# Patient Record
Sex: FEMALE | Race: BLACK OR AFRICAN AMERICAN | Employment: UNEMPLOYED | URBAN - METROPOLITAN AREA
[De-identification: names, ages, dates, MRNs, and addresses within clinical notes are randomized per-mention and may not be internally consistent; named-entity substitution may affect disease eponyms.]

---

## 2017-01-30 ENCOUNTER — OFFICE VISIT (OUTPATIENT)
Dept: FAMILY MEDICINE CLINIC | Age: 37
End: 2017-01-30

## 2017-01-30 VITALS
OXYGEN SATURATION: 99 % | DIASTOLIC BLOOD PRESSURE: 68 MMHG | HEIGHT: 65 IN | HEART RATE: 92 BPM | TEMPERATURE: 98.3 F | RESPIRATION RATE: 16 BRPM | WEIGHT: 293 LBS | SYSTOLIC BLOOD PRESSURE: 138 MMHG | BODY MASS INDEX: 48.82 KG/M2

## 2017-01-30 DIAGNOSIS — Z86.73 HISTORY OF TIA (TRANSIENT ISCHEMIC ATTACK): ICD-10-CM

## 2017-01-30 DIAGNOSIS — E11.69 DIABETES MELLITUS TYPE 2 IN OBESE (HCC): Chronic | ICD-10-CM

## 2017-01-30 DIAGNOSIS — I10 ESSENTIAL HYPERTENSION WITH GOAL BLOOD PRESSURE LESS THAN 140/90: Primary | ICD-10-CM

## 2017-01-30 DIAGNOSIS — E66.9 DIABETES MELLITUS TYPE 2 IN OBESE (HCC): Chronic | ICD-10-CM

## 2017-01-30 RX ORDER — ALBUTEROL SULFATE 90 UG/1
2 AEROSOL, METERED RESPIRATORY (INHALATION)
Qty: 1 INHALER | Refills: 5 | Status: SHIPPED | OUTPATIENT
Start: 2017-01-30 | End: 2020-05-08 | Stop reason: SDUPTHER

## 2017-01-30 RX ORDER — ATORVASTATIN CALCIUM 40 MG/1
40 TABLET, FILM COATED ORAL
Qty: 30 TAB | Refills: 5 | Status: SHIPPED | OUTPATIENT
Start: 2017-01-30 | End: 2019-04-03 | Stop reason: ALTCHOICE

## 2017-01-30 RX ORDER — GUAIFENESIN 100 MG/5ML
81 LIQUID (ML) ORAL DAILY
Qty: 30 TAB | Refills: 0 | Status: CANCELLED | OUTPATIENT
Start: 2017-01-30

## 2017-01-30 RX ORDER — AMLODIPINE BESYLATE 5 MG/1
5 TABLET ORAL DAILY
Qty: 30 TAB | Refills: 5 | Status: SHIPPED | OUTPATIENT
Start: 2017-01-30 | End: 2019-06-10

## 2017-01-30 NOTE — PROGRESS NOTES
Subjective:     Chief Complaint   Patient presents with    Hypertension     6 month follow-up      She  is a 31 y.o. female who presents for evaluation of:  Hyperlipidemia & HTN  Pt is doing well on current meds with no medication side effects noted  No new myalgias, no joint pains, no weakness  No chest pain on exertion, no dyspnea on exertion, no swelling of ankles. Exercising - 2x per day  Dieting - Yes  Smoking - No     Lab Results   Component Value Date/Time    Cholesterol, total 117 07/15/2016 04:46 AM    HDL Cholesterol 35 07/15/2016 04:46 AM    LDL, calculated 62.6 07/15/2016 04:46 AM    Triglyceride 97 07/15/2016 04:46 AM     F/u after probable TIA with left sided weakness. During hospitalization, had 2 CTs, Echo, Carotid dopplers, labs, and MRI Brain that were all unrevealing. Concerning Fhx with uncle having 2 CVAs at a younger age. Sx had nearly resolved. After going back to work, she had some dizziness sx with min hand weakness. She went home early and sx resolved. Work stress was severe until leaving her job back in July. Home stressors improved after recent move. Had left sided weakness on 8/15/16. Was laying on sofa watching TV and then had weakness when standing. Leg seemed more weak than arm. Sx last 30-45 minutes. Resolved spontaneously with  helping her walk around. No specific triggers or stressors. Noticed bottom of feet are numb and tingling when laying down on stomach x 2 episodes. This has resolved. No further symptoms since last appt. Taking ASA 81, Lipitor, and BP controlled with Amlodipine.     ROS  Gen - no fever/chills  Resp - no dyspnea or cough  CV - no chest pain or DURON  Rest per HPI     Objective:     Vitals:    01/30/17 1040   BP: 138/68   Pulse: 92   Resp: 16   Temp: 98.3 °F (36.8 °C)   TempSrc: Oral   SpO2: 99%   Weight: 335 lb 9.6 oz (152.2 kg)   Height: 5' 5\" (1.651 m)     Physical Examination:  General appearance - alert, well appearing, and in no distress  Eyes -sclera anicteric  Neck - supple, no significant adenopathy, no thyromegaly, no bruits  Chest - clear to auscultation, no wheezes, rales or rhonchi, symmetric air entry  Heart - normal rate, regular rhythm, normal S1, S2, no murmurs, rubs, clicks or gallops  Neurological - alert, oriented, no focal findings or movement disorder noted, nml FNF and HOANG, neg pronator drift, nml strength and sensation, nml patellar reflexes, quick witted responses to questions  Extr - no edema    Past Medical History   Diagnosis Date    Asthma     Diabetes ([facility] [address] [facility]     Diabetes mellitus type 2 in obese -- Diet controlled 10/28/2014    Morbid obesity ([facility] [address] 10/28/2014     Past Surgical History   Procedure Laterality Date    Hx gyn       3 C-sections    Hx gyn       Miscarriage     Current Outpatient Prescriptions on File Prior to Visit   Medication Sig Dispense Refill    aspirin 81 mg chewable tablet Take 1 Tab by mouth daily. 30 Tab 0     No current facility-administered medications on file prior to visit. Assessment/ Plan:   Ruth was seen today for hypertension. Diagnoses and all orders for this visit:    Essential hypertension with goal blood pressure less than 140/90 - controlled  -     amLODIPine (NORVASC) 5 mg tablet; Take 1 Tab by mouth daily. Indications: Hypertension    History of TIA (transient ischemic attack) - no new sx. Sx resolved, no further TIA sx since hospitalization. Advised to ct  mg daily. Would still consider doing hypercoag w/u including antiphospholipid testing given 2 previous miscarriages and unclear etiology. -     amLODIPine (NORVASC) 5 mg tablet; Take 1 Tab by mouth daily. Indications: Hypertension  -     atorvastatin (LIPITOR) 40 mg tablet; Take 1 Tab by mouth nightly. Indications: hypercholesterolemia  -     LIPID PANEL; Future    Diabetes mellitus type 2 in obese -- Diet controlled  -     LIPID PANEL; Future  -     HEMOGLOBIN A1C WITH EAG;  Future    Other orders  -     albuterol (PROVENTIL HFA, VENTOLIN HFA, PROAIR HFA) 90 mcg/actuation inhaler; Take 2 Puffs by inhalation every four (4) hours as needed for Wheezing. I have discussed the diagnosis with the patient and the intended plan as seen in the above orders. The patient has received an after-visit summary and questions were answered concerning future plans. I have discussed medication side effects and warnings with the patient as well. The patient verbalizes understanding and agreement with the plan. Follow-up Disposition:  Return in about 3 months (around 4/30/2017), or if symptoms worsen or fail to improve.

## 2017-01-30 NOTE — PROGRESS NOTES
Chief Complaint   Patient presents with    Hypertension     6 month follow-up   Discuss restarting Adipex  Room 6

## 2017-01-30 NOTE — MR AVS SNAPSHOT
Visit Information Date & Time Provider Department Dept. Phone Encounter #  
 1/30/2017 10:30 AM Belia Brar MD [facility] at [facility] [id number] Follow-up Instructions Return in about 3 months (around 4/30/2017), or if symptoms worsen or fail to improve. Upcoming Health Maintenance Date Due  
 EYE EXAM RETINAL OR DILATED Q1 10/9/2016 MICROALBUMIN Q1 10/28/2016 HEMOGLOBIN A1C Q6M 1/16/2017 LIPID PANEL Q1 7/15/2017 FOOT EXAM Q1 7/25/2017 PAP AKA CERVICAL CYTOLOGY 3/15/2018 DTaP/Tdap/Td series (2 - Td) 7/25/2026 Allergies as of 1/30/2017  Review Complete On: 1/30/2017 By: Belia Brar MD  
  
 Severity Noted Reaction Type Reactions Metformin  12/03/2014   Systemic Other (comments) Hair loss and unstable blood sugars Pcn [Penicillins]  10/28/2014    Swelling Current Immunizations  Never Reviewed No immunizations on file. Not reviewed this visit You Were Diagnosed With   
  
 Codes Comments Essential hypertension with goal blood pressure less than 140/90    -  Primary ICD-10-CM: I10 
ICD-9-CM: 401.9 History of TIA (transient ischemic attack)     ICD-10-CM: Z86.73 
ICD-9-CM: V12.54 Diabetes mellitus type 2 in obese (HCC)     ICD-10-CM: E11.9, E66.9 ICD-9-CM: 250.00, 278.00 Vitals BP Pulse Temp Resp Height(growth percentile) Weight(growth percentile)  
 138/68 (BP 1 Location: Left arm, BP Patient Position: Sitting) 92 98.3 °F (36.8 °C) (Oral) 16 5' 5\" (1.651 m) 335 lb 9.6 oz (152.2 kg) LMP SpO2 BMI OB Status Smoking Status 12/27/2016 99% 55.85 kg/m2 Unknown Former Smoker Vitals History BMI and BSA Data Body Mass Index Body Surface Area 55.85 kg/m 2 2.64 m 2 Preferred Pharmacy Pharmacy Name Phone [facility] [address] [phone] Your Updated Medication List  
  
   
 This list is accurate as of: 1/30/17 11:14 AM.  Always use your most recent med list.  
  
  
  
  
 albuterol 90 mcg/actuation inhaler Commonly known as:  PROVENTIL HFA, VENTOLIN HFA, PROAIR HFA Take 2 Puffs by inhalation every four (4) hours as needed for Wheezing. amLODIPine 5 mg tablet Commonly known as:  Elspemallika Bakes Take 1 Tab by mouth daily. Indications: Hypertension  
  
 aspirin 81 mg chewable tablet Take 1 Tab by mouth daily. atorvastatin 40 mg tablet Commonly known as:  LIPITOR Take 1 Tab by mouth nightly. Indications: hypercholesterolemia Prescriptions Sent to Pharmacy Refills  
 amLODIPine (NORVASC) 5 mg tablet 5 Sig: Take 1 Tab by mouth daily. Indications: Hypertension Class: Normal  
 Pharmacy: [address] Ph #: [phone] Route: Oral  
 atorvastatin (LIPITOR) 40 mg tablet 5 Sig: Take 1 Tab by mouth nightly. Indications: hypercholesterolemia Class: Normal  
 Pharmacy: [address] Ph #: [phone] Route: Oral  
 albuterol (PROVENTIL HFA, VENTOLIN HFA, PROAIR HFA) 90 mcg/actuation inhaler 5 Sig: Take 2 Puffs by inhalation every four (4) hours as needed for Wheezing. Class: Normal  
 Pharmacy: [address] Ph #: [phone] Route: Inhalation Follow-up Instructions Return in about 3 months (around 4/30/2017), or if symptoms worsen or fail to improve. To-Do List   
 01/30/2017 Lab:  HEMOGLOBIN A1C WITH EAG   
  
 01/30/2017 Lab:  LIPID PANEL Introducing [facility]! Keven Velazquez introduces [employer] patient portal. Now you can access parts of your medical record, email your doctor's office, and request medication refills online. 1. In your internet browser, go to [email or web address] 2. Click on the First Time User? Click Here link in the Sign In box. You will see the New Member Sign Up page. 3. Enter your Carma Access Code exactly as it appears below. You will not need to use this code after youve completed the sign-up process. If you do not sign up before the expiration date, you must request a new code. · Carma Access Code: [email or web address] Expires: 4/30/2017 11:14 AM 
 
4. Enter the last four digits of your Social Security Number (xxxx) and Date of Birth (mm/dd/yyyy) as indicated and click Submit. You will be taken to the next sign-up page. 5. Create a Carma ID. This will be your Carma login ID and cannot be changed, so think of one that is secure and easy to remember. 6. Create a Carma password. You can change your password at any time. 7. Enter your Password Reset Question and Answer. This can be used at a later time if you forget your password. 8. Enter your e-mail address. You will receive e-mail notification when new information is available in [address] 9. Click Sign Up. You can now view and download portions of your medical record. 10. Click the Download Summary menu link to download a portable copy of your medical information. If you have questions, please visit the Frequently Asked Questions section of the Carma website. Remember, Carma is NOT to be used for urgent needs. For medical emergencies, dial 911. Now available from your iPhone and Android! Please provide this summary of care documentation to your next provider. Your primary care clinician is listed as Belia Brar. If you have any questions after today's visit, please call [phone].

## 2019-04-03 ENCOUNTER — HOSPITAL ENCOUNTER (OUTPATIENT)
Dept: GENERAL RADIOLOGY | Age: 39
Discharge: HOME | End: 2019-04-03
Payer: MEDICAID

## 2019-04-03 ENCOUNTER — OFFICE VISIT (OUTPATIENT)
Dept: FAMILY MEDICINE CLINIC | Age: 39
End: 2019-04-03

## 2019-04-03 VITALS
HEART RATE: 97 BPM | SYSTOLIC BLOOD PRESSURE: 147 MMHG | TEMPERATURE: 98.3 F | OXYGEN SATURATION: 98 % | HEIGHT: 65 IN | BODY MASS INDEX: 48.82 KG/M2 | WEIGHT: 293 LBS | RESPIRATION RATE: 18 BRPM | DIASTOLIC BLOOD PRESSURE: 72 MMHG

## 2019-04-03 DIAGNOSIS — E66.9 DIABETES MELLITUS TYPE 2 IN OBESE (HCC): ICD-10-CM

## 2019-04-03 DIAGNOSIS — M25.562 ACUTE PAIN OF LEFT KNEE: ICD-10-CM

## 2019-04-03 DIAGNOSIS — L81.0 POSTINFLAMMATORY HYPERPIGMENTATION: ICD-10-CM

## 2019-04-03 DIAGNOSIS — M76.52 PATELLAR TENDINITIS OF LEFT KNEE: ICD-10-CM

## 2019-04-03 DIAGNOSIS — E11.69 DIABETES MELLITUS TYPE 2 IN OBESE (HCC): ICD-10-CM

## 2019-04-03 DIAGNOSIS — Z00.00 WELL ADULT EXAM: Primary | ICD-10-CM

## 2019-04-03 DIAGNOSIS — L81.1 MELASMA: ICD-10-CM

## 2019-04-03 LAB
BILIRUB UR QL STRIP: NEGATIVE
GLUCOSE UR-MCNC: NEGATIVE MG/DL
KETONES P FAST UR STRIP-MCNC: NEGATIVE MG/DL
PH UR STRIP: 5.5 [PH] (ref 4.6–8)
PROT UR QL STRIP: NEGATIVE
SP GR UR STRIP: 1.02 (ref 1–1.03)
UA UROBILINOGEN AMB POC: NORMAL (ref 0.2–1)
URINALYSIS CLARITY POC: NORMAL
URINALYSIS COLOR POC: NORMAL
URINE BLOOD POC: NEGATIVE
URINE LEUKOCYTES POC: NEGATIVE
URINE NITRITES POC: NEGATIVE

## 2019-04-03 PROCEDURE — 73560 X-RAY EXAM OF KNEE 1 OR 2: CPT

## 2019-04-03 RX ORDER — MELOXICAM 15 MG/1
15 TABLET ORAL
Qty: 30 TAB | Refills: 0 | Status: SHIPPED | OUTPATIENT
Start: 2019-04-03 | End: 2019-08-08 | Stop reason: ALTCHOICE

## 2019-04-03 RX ORDER — BISMUTH SUBSALICYLATE 262 MG
1 TABLET,CHEWABLE ORAL DAILY
COMMUNITY
End: 2020-08-17

## 2019-04-03 NOTE — LETTER
NOTIFICATION RETURN TO WORK / SCHOOL 
 
4/3/2019 12:26 PM 
 
Ms. Charbel Jose Alberto [address] [place] [address] To Whom It May Concern: 
 
Charbel [address] [facility] the [facility] She will return to work/school on: 4/8/19 If there are questions or concerns please have the patient contact our office.  
 
 
 
Sincerely, 
 
 
Amanda Castaneda MD

## 2019-04-03 NOTE — PROGRESS NOTES
Subjective:     Chief Complaint   Patient presents with    Complete Physical     Annual        She  is a 42 y.o. female who presents for evaluation of:  Pap about 2 yrs ago. LMP was last week. Menses are regular. Using condom for prevention. Diabetes Mellitus:  Taking meds, home glucose monitoring: is not performed  Reports no polyuria or polydipsia, no chest pain, dyspnea or TIA's, no numbness, tingling or pain in extremities, last eye exam approximately > 1 yr ago. Exercises regularly with walking until knee injury 3 weeks ago. Compliant with diabetic diet. Avoids sodas and sweet teas. Avoids fast food. Limits carbs. Pt is a non smoker x 1 yr. Lab Results   Component Value Date/Time    Hemoglobin A1c (POC) 6.5 03/30/2016 10:46 AM    Hemoglobin A1c (POC) 6.4 10/28/2015 12:06 PM    Hemoglobin A1c 6.6 (H) 07/16/2016 04:12 AM    Microalb/Creat ratio (ug/mg creat.) <2.3 04/03/2019 12:30 PM    LDL, calculated 62.6 07/15/2016 04:46 AM    Creatinine 0.72 07/15/2016 04:46 AM      Lab Results   Component Value Date/Time    GFR est AA >60 07/15/2016 04:46 AM    GFR est non-AA >60 07/15/2016 04:46 AM      Lab Results   Component Value Date/Time    TSH 5.57 (H) 07/15/2016 04:46 AM       ROS:  Constitutional: negative for fevers, chills and fatigue  Eyes: negative for visual disturbance  Ears, nose, mouth, throat, and face: negative for hearing loss and sore throat  Respiratory: negative for cough or dyspnea on exertion  Cardiovascular: negative for chest pain, dyspnea, palpitations, fatigue  Gastrointestinal: negative for nausea, vomiting, change in bowel habits, diarrhea and abdominal pain  Genitourinary:negative for frequency and dysuria  Integument/breast: + skin changes around bilat eyes, has become darker over time. Hematologic/lymphatic: negative for easy bruising and bleeding  Musculoskeletal: left knee pain - no injury. tried ice, rest, and aleve. Getting tight after standing.   Worse with walking down hills and stairs. Sx x 3 weeks. Recently moved just before this. Neurological: negative for headaches and dizziness  Behavioral/Psych: negative for anxiety and depression     Objective:     Vitals:    04/03/19 1046   BP: 147/72   Pulse: 97   Resp: 18   Temp: 98.3 °F (36.8 °C)   TempSrc: Oral   SpO2: 98%   Weight: 336 lb (152.4 kg)   Height: 5' 5\" (1.651 m)     Physical Examination:  General appearance - alert, well appearing, and in no distress  Ears - bilat nml TMs  Eyes - sclera anicteric  Nose - normal and patent, no erythema, discharge or polyps  Mouth - mucous membranes moist, pharynx normal without lesions  Neck - supple, no significant adenopathy  Lymphatics - no palpable lymphadenopathy, no hepatosplenomegaly  Chest - clear to auscultation, no wheezes, rales or rhonchi, symmetric air entry  Heart - normal rate, regular rhythm, normal S1, S2, no murmurs, rubs, clicks or gallops  Abdomen - soft, nontender, nondistended, no masses or organomegaly   - not indicated  Neurological - alert, oriented, normal speech, no focal findings or movement disorder noted  Musculoskeletal -full range of motion, mildly tender to palpation over patellar tendon, no joint line tenderness, negative Lachman's and posterior drawer. No effusion or bruising. Extremities - no edema  Skin -hyperpigmented patches lateral to bilateral eyes  Psych - nml mood and affect    Past Medical History:   Diagnosis Date    Asthma     [facility] [address]     Diabetes mellitus type 2 in obese -- Diet controlled 10/28/2014    Morbid obesity ([facility] [address] 10/28/2014     Past Surgical History:   Procedure Laterality Date    HX GYN      3 C-sections    HX GYN      Miscarriage     Current Outpatient Medications on File Prior to Visit   Medication Sig Dispense Refill    FENUGREEK SEED EXTRACT PO Take  by mouth daily.  MILK THISTLE PO Take  by mouth daily.  multivitamin (ONE A DAY) tablet Take 1 Tab by mouth daily.       cranberry extract 450 mg tab tablet Take 450 mg by mouth daily.  albuterol (PROVENTIL HFA, VENTOLIN HFA, PROAIR HFA) 90 mcg/actuation inhaler Take 2 Puffs by inhalation every four (4) hours as needed for Wheezing. 1 Inhaler 5    amLODIPine (NORVASC) 5 mg tablet Take 1 Tab by mouth daily. Indications: Hypertension 30 Tab 5     No current facility-administered medications on file prior to visit. Assessment/ Plan:   Diagnoses and all orders for this visit:    1. Well adult exam  -     CBC W/O DIFF  -     HEMOGLOBIN A1C WITH EAG  -     LIPID PANEL  -     METABOLIC PANEL, COMPREHENSIVE  -     TSH 3RD GENERATION  -     AMB POC URINALYSIS DIP STICK AUTO W/O MICRO  -     REFERRAL TO OBSTETRICS AND GYNECOLOGY    2. Diabetes mellitus type 2 in obese (HCC)-well-controlled, rechecking labs  -     HEMOGLOBIN A1C WITH EAG  -     LIPID PANEL  -     METABOLIC PANEL, COMPREHENSIVE  -     TSH 3RD GENERATION  -     MICROALBUMIN, UR, RAND W/ MICROALB/CREAT RATIO    3. Body mass index (BMI) of 50.0 to 59.9 in adult (HCC)-was doing well with working out regularly but injured knee recently so has been unable to continue losing weight  Discussed the patient's BMI. The BMI follow up plan is as follows:   dietary management education, guidance, and counseling  encourage exercise  monitor weight  prescribed dietary intake    4. Acute pain of left knee-getting x-rays and will use Mobic acutely. Would expect full resolution over 4 to 6 weeks  -     XR KNEE LT MAX 2 VWS; Future  -     meloxicam (MOBIC) 15 mg tablet; Take 1 Tab by mouth daily (after breakfast). Take x 1 week and then stop. May use daily after this as needed. 5. Postinflammatory hyperpigmentation  6. Melasma  - Unclear etiology at this point, suspecting melasma but patient has noticed this getting worse over the past year  -     REFERRAL TO DERMATOLOGY    7.  Patellar tendinitis of left knee-given reassurance and treating as above    I have discussed the diagnosis with the patient and the intended plan as seen in the above orders. The patient has received an after-visit summary and questions were answered concerning future plans. I have discussed medication side effects and warnings with the patient as well. The patient verbalizes understanding and agreement with the plan. Follow-up and Dispositions    · Return in about 3 months (around 7/3/2019).

## 2019-04-03 NOTE — PROGRESS NOTES
Chief Complaint   Patient presents with    Complete Physical     Annual   1. Have you been to the ER, urgent care clinic since your last visit? Hospitalized since your last visit? Yes Where: [facility]    2. Have you seen or consulted any other health care providers outside of the [address] [place] [address] since your last visit? Include any pap smears or colon screening.  No   Discuss Left knee pain

## 2019-04-04 LAB
ALBUMIN/CREAT UR: <2.3 MG/G CREAT (ref 0–30)
CREAT UR-MCNC: 129.6 MG/DL
MICROALBUMIN UR-MCNC: <3 UG/ML

## 2019-04-15 RX ORDER — PHENTERMINE HYDROCHLORIDE 37.5 MG/1
37.5 TABLET ORAL
Qty: 30 TAB | Refills: 1 | Status: SHIPPED | OUTPATIENT
Start: 2019-04-15 | End: 2020-03-03 | Stop reason: SDUPTHER

## 2019-05-22 DIAGNOSIS — J01.00 ACUTE MAXILLARY SINUSITIS, RECURRENCE NOT SPECIFIED: ICD-10-CM

## 2019-05-22 RX ORDER — AZITHROMYCIN 250 MG/1
TABLET, FILM COATED ORAL
Qty: 6 TAB | Refills: 0 | Status: SHIPPED | OUTPATIENT
Start: 2019-05-22 | End: 2019-05-27

## 2019-05-22 NOTE — PROGRESS NOTES
Pt with purulent sputum, fever, and coughing x1 week.   She has a penicillin allergy has tolerated azithromycin well in the past.  Sending an Rx for this and will have patient follow-up in 1 week if symptoms are not resolved

## 2019-08-08 ENCOUNTER — OFFICE VISIT (OUTPATIENT)
Dept: FAMILY MEDICINE CLINIC | Age: 39
End: 2019-08-08

## 2019-08-08 VITALS
OXYGEN SATURATION: 96 % | DIASTOLIC BLOOD PRESSURE: 67 MMHG | HEIGHT: 65 IN | HEART RATE: 94 BPM | TEMPERATURE: 98.4 F | BODY MASS INDEX: 48.82 KG/M2 | WEIGHT: 293 LBS | RESPIRATION RATE: 18 BRPM | SYSTOLIC BLOOD PRESSURE: 143 MMHG

## 2019-08-08 DIAGNOSIS — E11.69 DIABETES MELLITUS TYPE 2 IN OBESE (HCC): Primary | ICD-10-CM

## 2019-08-08 DIAGNOSIS — M79.642 BILATERAL HAND PAIN: ICD-10-CM

## 2019-08-08 DIAGNOSIS — G89.29 CHRONIC PAIN OF LEFT KNEE: ICD-10-CM

## 2019-08-08 DIAGNOSIS — M79.641 BILATERAL HAND PAIN: ICD-10-CM

## 2019-08-08 DIAGNOSIS — M76.52 PATELLAR TENDINITIS OF LEFT KNEE: ICD-10-CM

## 2019-08-08 DIAGNOSIS — M25.562 CHRONIC PAIN OF LEFT KNEE: ICD-10-CM

## 2019-08-08 DIAGNOSIS — E66.9 DIABETES MELLITUS TYPE 2 IN OBESE (HCC): Primary | ICD-10-CM

## 2019-08-08 RX ORDER — MELOXICAM 15 MG/1
15 TABLET ORAL
Qty: 30 TAB | Refills: 0 | Status: SHIPPED | OUTPATIENT
Start: 2019-08-08 | End: 2020-04-23 | Stop reason: ALTCHOICE

## 2019-08-08 RX ORDER — DICLOFENAC SODIUM 10 MG/G
GEL TOPICAL
Qty: 100 G | Refills: 1 | Status: SHIPPED | OUTPATIENT
Start: 2019-08-08 | End: 2020-04-23 | Stop reason: ALTCHOICE

## 2019-08-08 NOTE — PROGRESS NOTES
Chief Complaint   Patient presents with    Weight Management     3 month follow-up   1. Have you been to the ER, urgent care clinic since your last visit? Hospitalized since your last visit? No    2. Have you seen or consulted any other health care providers outside of the [address] [place] [address] since your last visit? Include any pap smears or colon screening.  No   Discuss weight

## 2019-08-08 NOTE — PROGRESS NOTES
Subjective:     Chief Complaint   Patient presents with    Weight Management     3 month follow-up        She  is a 42 y.o. female who presents for evaluation of:  Here to follow-up on obesity. She is still taking phentermine 37.5 mg daily. She has gained 9 pounds over the last 4 months. She has cut back significantly on her exercise due to knee pain. At our last appointment, we got x-rays of her knees which came back normal with no arthritis. She was started on Mobic which seemed to help slightly. She knows that a lot of this is related to her weight and job during which she has to do a lot of walking and running as a . She does have diabetes and is well controlled. She was unable to tolerate metformin due to GI side effects. ROS  Gen - no fever/chills  Resp - no dyspnea or cough  CV - no chest pain or DURON  Musculoskeletal: left knee pain - no injury. tried ice, rest, and aleve. Getting tight after standing. Worse with walking down hills and stairs. Sx x 4 months. Seem to start after moving into her new home. Much worse with having to walk or run. At last appointment got x-rays that came back with no concerns. Seem to improve slightly with Mobic but has run out of this.   Rest per HPI     Objective:     Vitals:    08/08/19 0933 08/08/19 1038   BP: 151/76 143/67   Pulse: 94    Resp: 18    Temp: 98.4 °F (36.9 °C)    TempSrc: Oral    SpO2: 96%    Weight: 345 lb 9.6 oz (156.8 kg)    Height: 5' 5\" (1.651 m)      Physical Examination:  General appearance - alert, well appearing, and in no distress  Ears - bilat nml TMs  Eyes - sclera anicteric  Neck - supple, no significant adenopathy  Chest - clear to auscultation, no wheezes, rales or rhonchi, symmetric air entry  Heart - normal rate, regular rhythm, normal S1, S2, no murmurs, rubs, clicks or gallops  Neurological - alert, oriented, normal speech, no focal findings or movement disorder noted  Musculoskeletal -L knee full range of motion, mildly tender to palpation over patellar tendon, no joint line tenderness, negative Lachman's and posterior drawer. No effusion or bruising. Bilateral hands-full range of motion, mild swelling especially in the right wrist.  Mildly tender to palpation over right wrist.  Negative Phalen's and Tinel's  Extremities - no edema  Skin -hyperpigmented patches lateral to bilateral eyes  Psych - nml mood and affect    Past Medical History:   Diagnosis Date    Asthma     [facility] [address]     Diabetes mellitus type 2 in obese -- Diet controlled 10/28/2014    Morbid obesity ([facility] [address] 10/28/2014     Past Surgical History:   Procedure Laterality Date    HX GYN      3 C-sections    HX GYN      Miscarriage     Current Outpatient Medications on File Prior to Visit   Medication Sig Dispense Refill    phentermine (ADIPEX-P) 37.5 mg tablet Take 1 Tab by mouth every morning. Max Daily Amount: 37.5 mg. 30 Tab 1    multivitamin (ONE A DAY) tablet Take 1 Tab by mouth daily.  cranberry extract 450 mg tab tablet Take 450 mg by mouth daily.  albuterol (PROVENTIL HFA, VENTOLIN HFA, PROAIR HFA) 90 mcg/actuation inhaler Take 2 Puffs by inhalation every four (4) hours as needed for Wheezing. 1 Inhaler 5     No current facility-administered medications on file prior to visit. Assessment/ Plan:   Diagnoses and all orders for this visit:    1. Diabetes mellitus type 2 in obese (HCC)-controlled, rechecking labs    2. Body mass index (BMI) of 50.0 to 59.9 in adult (HCC)-continue on phentermine at this point, needs to make major changes to diet and exercise. We briefly discussed the potential for bariatric surgery in the future. Would consider additional medicines for diabetes like GLP-1 or SGLT2 to help with diabetes and weight control  Discussed the patient's BMI.   The BMI follow up plan is as follows:   dietary management education, guidance, and counseling  encourage exercise  monitor weight  prescribed dietary intake    3. Patellar tendinitis of left knee  4. Chronic pain of left knee  -Will use Voltaren gel and Mobic to help with pain control. Sending to physical therapy. If not improving over the next 6 weeks, would send for MRI  -     diclofenac (VOLTAREN) 1 % gel; Apply  to affected area four (4) times daily as needed for Pain.  -     REFERRAL TO PHYSICAL THERAPY  -     meloxicam (MOBIC) 15 mg tablet; Take 1 Tab by mouth daily (after breakfast). Take x 1 week and then stop. May use daily after this as needed. 5. Bilateral hand pain-most consistent with musculoskeletal pain related to job. Should improve with rest and anti-inflammatories  -     diclofenac (VOLTAREN) 1 % gel; Apply  to affected area four (4) times daily as needed for Pain.  -     meloxicam (MOBIC) 15 mg tablet; Take 1 Tab by mouth daily (after breakfast). Take x 1 week and then stop. May use daily after this as needed. I have discussed the diagnosis with the patient and the intended plan as seen in the above orders. The patient has received an after-visit summary and questions were answered concerning future plans. I have discussed medication side effects and warnings with the patient as well. The patient verbalizes understanding and agreement with the plan. Follow-up and Dispositions    · Return in about 6 weeks (around 9/19/2019), or if symptoms worsen or fail to improve.

## 2019-08-09 LAB
ALBUMIN SERPL-MCNC: 4.1 G/DL (ref 3.5–5.5)
ALBUMIN/GLOB SERPL: 1.4 {RATIO} (ref 1.2–2.2)
ALP SERPL-CCNC: 90 IU/L (ref 39–117)
ALT SERPL-CCNC: 21 IU/L (ref 0–32)
AST SERPL-CCNC: 17 IU/L (ref 0–40)
BILIRUB SERPL-MCNC: <0.2 MG/DL (ref 0–1.2)
BUN SERPL-MCNC: 13 MG/DL (ref 6–20)
BUN/CREAT SERPL: 18 (ref 9–23)
CALCIUM SERPL-MCNC: 9.1 MG/DL (ref 8.7–10.2)
CHLORIDE SERPL-SCNC: 104 MMOL/L (ref 96–106)
CHOLEST SERPL-MCNC: 104 MG/DL (ref 100–199)
CO2 SERPL-SCNC: 23 MMOL/L (ref 20–29)
CREAT SERPL-MCNC: 0.71 MG/DL (ref 0.57–1)
ERYTHROCYTE [DISTWIDTH] IN BLOOD BY AUTOMATED COUNT: 13 % (ref 12.3–15.4)
EST. AVERAGE GLUCOSE BLD GHB EST-MCNC: 148 MG/DL
GLOBULIN SER CALC-MCNC: 3 G/DL (ref 1.5–4.5)
GLUCOSE SERPL-MCNC: 128 MG/DL (ref 65–99)
HBA1C MFR BLD: 6.8 % (ref 4.8–5.6)
HCT VFR BLD AUTO: 37.2 % (ref 34–46.6)
HDLC SERPL-MCNC: 46 MG/DL
HGB BLD-MCNC: 12 G/DL (ref 11.1–15.9)
LDLC SERPL CALC-MCNC: 37 MG/DL (ref 0–99)
MCH RBC QN AUTO: 28.9 PG (ref 26.6–33)
MCHC RBC AUTO-ENTMCNC: 32.3 G/DL (ref 31.5–35.7)
MCV RBC AUTO: 90 FL (ref 79–97)
PLATELET # BLD AUTO: 343 X10E3/UL (ref 150–450)
POTASSIUM SERPL-SCNC: 4.3 MMOL/L (ref 3.5–5.2)
PROT SERPL-MCNC: 7.1 G/DL (ref 6–8.5)
RBC # BLD AUTO: 4.15 X10E6/UL (ref 3.77–5.28)
SODIUM SERPL-SCNC: 141 MMOL/L (ref 134–144)
TRIGL SERPL-MCNC: 107 MG/DL (ref 0–149)
TSH SERPL DL<=0.005 MIU/L-ACNC: 2.66 UIU/ML (ref 0.45–4.5)
VLDLC SERPL CALC-MCNC: 21 MG/DL (ref 5–40)
WBC # BLD AUTO: 9.2 X10E3/UL (ref 3.4–10.8)

## 2020-03-03 ENCOUNTER — OFFICE VISIT (OUTPATIENT)
Dept: FAMILY MEDICINE CLINIC | Age: 40
End: 2020-03-03

## 2020-03-03 VITALS
TEMPERATURE: 99.1 F | SYSTOLIC BLOOD PRESSURE: 134 MMHG | RESPIRATION RATE: 16 BRPM | HEART RATE: 94 BPM | HEIGHT: 65 IN | WEIGHT: 293 LBS | DIASTOLIC BLOOD PRESSURE: 86 MMHG | OXYGEN SATURATION: 98 % | BODY MASS INDEX: 48.82 KG/M2

## 2020-03-03 DIAGNOSIS — G89.29 CHRONIC PAIN OF LEFT KNEE: ICD-10-CM

## 2020-03-03 DIAGNOSIS — E11.69 DIABETES MELLITUS TYPE 2 IN OBESE (HCC): Primary | ICD-10-CM

## 2020-03-03 DIAGNOSIS — E66.9 DIABETES MELLITUS TYPE 2 IN OBESE (HCC): Primary | ICD-10-CM

## 2020-03-03 DIAGNOSIS — L81.1 MELASMA: ICD-10-CM

## 2020-03-03 DIAGNOSIS — M76.52 PATELLAR TENDINITIS OF LEFT KNEE: ICD-10-CM

## 2020-03-03 DIAGNOSIS — M25.562 CHRONIC PAIN OF LEFT KNEE: ICD-10-CM

## 2020-03-03 RX ORDER — PHENTERMINE HYDROCHLORIDE 37.5 MG/1
37.5 TABLET ORAL
Qty: 30 TAB | Refills: 1 | Status: SHIPPED | OUTPATIENT
Start: 2020-03-03 | End: 2020-04-23 | Stop reason: SDUPTHER

## 2020-03-03 RX ORDER — HYDROQUINONE 40 MG/G
CREAM TOPICAL 2 TIMES DAILY
Qty: 30 G | Refills: 0 | Status: SHIPPED | OUTPATIENT
Start: 2020-03-03 | End: 2022-07-08

## 2020-03-03 NOTE — PROGRESS NOTES
Subjective:     Chief Complaint   Patient presents with    Weight Management     6 months follow-up        She  is a 47 y.o. female who presents for evaluation of:    Here to follow-up on obesity. No longer taking phentermine - last used this about 6/2019. Weight up 7 lbs since last appt. She has cut back significantly on her exercise due to knee pain and being busy with 2 jobs and home responsibilities. X-rays of her knees which came back normal with no arthritis. Mobic has helped slightly. Diet is not great currently. Eating out at fast food about 3-4 x per week. When cooking at home, eats fairly healthy foods. She does have diabetes and is well controlled. She was unable to tolerate metformin due to GI side effects. ROS  Gen - no fever/chills  Resp - no dyspnea or cough  CV - no chest pain or DURON  Musculoskeletal: left knee pain - no injury. tried ice, rest, and aleve. Getting tight after standing. Worse with walking down hills and stairs. Sx x 4 months. Seem to start after moving into her new home. Much worse with having to walk or run. At last appointment got x-rays that came back with no concerns. Seem to improve slightly with Mobic but has run out of this.   Rest per HPI     Objective:     Vitals:    03/03/20 0824   BP: 134/86   Pulse: 94   Resp: 16   Temp: 99.1 °F (37.3 °C)   TempSrc: Oral   SpO2: 98%   Weight: (!) 352 lb 9.6 oz (159.9 kg)   Height: 5' 5\" (1.651 m)     Physical Examination:  General appearance - alert, well appearing, and in no distress  Eyes - sclera anicteric  Neck - supple, no significant adenopathy  Chest - clear to auscultation, no wheezes, rales or rhonchi, symmetric air entry  Heart - normal rate, regular rhythm, normal S1, S2, no murmurs, rubs, clicks or gallops  Neurological - alert, oriented, normal speech, no focal findings or movement disorder noted  Musculoskeletal -L knee full range of motion, mildly tender to palpation over patellar tendon, no joint line tenderness, negative Lachman's and posterior drawer. No effusion or bruising. Extremities - no edema  Skin -hyperpigmented patches lateral to bilateral eyes c/w melasma  Psych - nml mood and affect    Past Medical History:   Diagnosis Date    Asthma     Diabetes ([facility]     Diabetes mellitus type 2 in obese -- Diet controlled 10/28/2014    Morbid obesity ([facility] 10/28/2014     Past Surgical History:   Procedure Laterality Date    HX GYN      3 C-sections    HX GYN      Miscarriage     Current Outpatient Medications on File Prior to Visit   Medication Sig Dispense Refill    diclofenac (VOLTAREN) 1 % gel Apply  to affected area four (4) times daily as needed for Pain. 100 g 1    meloxicam (MOBIC) 15 mg tablet Take 1 Tab by mouth daily (after breakfast). Take x 1 week and then stop. May use daily after this as needed. 30 Tab 0    multivitamin (ONE A DAY) tablet Take 1 Tab by mouth daily.  cranberry extract 450 mg tab tablet Take 450 mg by mouth daily.  albuterol (PROVENTIL HFA, VENTOLIN HFA, PROAIR HFA) 90 mcg/actuation inhaler Take 2 Puffs by inhalation every four (4) hours as needed for Wheezing. 1 Inhaler 5     No current facility-administered medications on file prior to visit. Assessment/ Plan:   Diagnoses and all orders for this visit:    1. Diabetes mellitus type 2 in obese ([facility] - Maddie Hallmark to help with DM control and weight maintenance.  -     HEMOGLOBIN A1C WITH EAG  -     METABOLIC PANEL, BASIC  -     dapagliflozin (FARXIGA) 5 mg tab tablet; Take 1 Tab by mouth daily. 2. Body mass index (BMI) of 50.0 to 59.9 in adult [facility]) - had honest discussion about options for tx. Would like to get more aggressive with diet and exercise. Trial of Phentermine again. We discussed long term options like bariatric sgy but pt not ready for this right now. Decided on tangible goals for exercise, calorie goals with tracking on MyFitness Pal, and decreasing fast food.   Discussed the patient's BMI.  The BMI follow up plan is as follows:   dietary management education, guidance, and counseling  encourage exercise  monitor weight  prescribed dietary intake  -     phentermine (ADIPEX-P) 37.5 mg tablet; Take 1 Tab by mouth every morning. Max Daily Amount: 37.5 mg.    3. Patellar tendinitis of left knee  4. Chronic pain of left knee  - ct to be mild but much improved with weight loss. 5. Melasma - trial of hydroquinone to help with sx  -     hydroquinone (ESOTERICA, MELQUIN) 4 % topical cream; Apply  to affected area two (2) times a day. I have discussed the diagnosis with the patient and the intended plan as seen in the above orders. The patient has received an after-visit summary and questions were answered concerning future plans. I have discussed medication side effects and warnings with the patient as well. The patient verbalizes understanding and agreement with the plan. Follow-up and Dispositions    · Return in about 6 weeks (around 4/14/2020), or if symptoms worsen or fail to improve.

## 2020-03-03 NOTE — PROGRESS NOTES
Chief Complaint   Patient presents with    Weight Management     6 months follow-up   1. Have you been to the ER, urgent care clinic since your last visit? Hospitalized since your last visit? Yes Where: [facility]   2. Have you seen or consulted any other health care providers outside of the [address] [place] [address] since your last visit? Include any pap smears or colon screening.  No   Discuss restarting medication

## 2020-03-03 NOTE — PATIENT INSTRUCTIONS
Goals for weight loss:    1) Exercises during work day 3x per day - squats, toe tapping, toe raises  2) Calories - myfitnesspal - count calories for the next 4 weeks - shoot for 1400 calories/day  3) Decrease fast food to 2 days per week

## 2020-03-04 LAB
BUN SERPL-MCNC: 14 MG/DL (ref 6–20)
BUN/CREAT SERPL: 18 (ref 9–23)
CALCIUM SERPL-MCNC: 9.9 MG/DL (ref 8.7–10.2)
CHLORIDE SERPL-SCNC: 104 MMOL/L (ref 96–106)
CO2 SERPL-SCNC: 19 MMOL/L (ref 20–29)
CREAT SERPL-MCNC: 0.76 MG/DL (ref 0.57–1)
EST. AVERAGE GLUCOSE BLD GHB EST-MCNC: 186 MG/DL
GLUCOSE SERPL-MCNC: 163 MG/DL (ref 65–99)
HBA1C MFR BLD: 8.1 % (ref 4.8–5.6)
POTASSIUM SERPL-SCNC: 4.7 MMOL/L (ref 3.5–5.2)
SODIUM SERPL-SCNC: 140 MMOL/L (ref 134–144)

## 2020-03-18 DIAGNOSIS — J45.901 MILD ASTHMA EXACERBATION: Primary | ICD-10-CM

## 2020-03-18 RX ORDER — AZITHROMYCIN 250 MG/1
TABLET, FILM COATED ORAL
Qty: 6 TAB | Refills: 0 | Status: SHIPPED | OUTPATIENT
Start: 2020-03-18 | End: 2020-03-23

## 2020-03-18 RX ORDER — PREDNISONE 20 MG/1
TABLET ORAL
Qty: 13 TAB | Refills: 0 | Status: SHIPPED | OUTPATIENT
Start: 2020-03-18 | End: 2020-04-23 | Stop reason: ALTCHOICE

## 2020-03-18 RX ORDER — MONTELUKAST SODIUM 10 MG/1
10 TABLET ORAL DAILY
Qty: 30 TAB | Refills: 2 | Status: SHIPPED | OUTPATIENT
Start: 2020-03-18 | End: 2020-05-08 | Stop reason: SDUPTHER

## 2020-03-18 NOTE — PROGRESS NOTES
Reviewed mild sx. Pt recently had asthmatic bronchitis. Tx with Prednisone and Azithro and will start on daily Singulair.

## 2020-04-23 ENCOUNTER — VIRTUAL VISIT (OUTPATIENT)
Dept: FAMILY MEDICINE CLINIC | Age: 40
End: 2020-04-23

## 2020-04-23 DIAGNOSIS — E66.9 DIABETES MELLITUS TYPE 2 IN OBESE (HCC): Primary | ICD-10-CM

## 2020-04-23 DIAGNOSIS — E11.69 DIABETES MELLITUS TYPE 2 IN OBESE (HCC): Primary | ICD-10-CM

## 2020-04-23 RX ORDER — SULFAMETHOXAZOLE AND TRIMETHOPRIM 800; 160 MG/1; MG/1
TABLET ORAL
COMMUNITY
Start: 2020-04-21 | End: 2020-06-10 | Stop reason: ALTCHOICE

## 2020-04-23 RX ORDER — INDOMETHACIN 50 MG/1
CAPSULE ORAL
COMMUNITY
Start: 2020-04-21 | End: 2020-06-10 | Stop reason: ALTCHOICE

## 2020-04-23 RX ORDER — CEPHALEXIN 250 MG/1
CAPSULE ORAL
COMMUNITY
Start: 2020-04-21 | End: 2020-06-10 | Stop reason: ALTCHOICE

## 2020-04-23 RX ORDER — PHENTERMINE HYDROCHLORIDE 37.5 MG/1
37.5 TABLET ORAL
Qty: 30 TAB | Refills: 1 | Status: SHIPPED | OUTPATIENT
Start: 2020-04-23 | End: 2020-06-25 | Stop reason: SDUPTHER

## 2020-04-23 NOTE — PROGRESS NOTES
Consent: Stefania Zhang, who was seen by synchronous (real-time) audio-video technology, and/or her healthcare decision maker, is aware that this patient-initiated, Telehealth encounter on 4/23/2020 is a billable service, with coverage as determined by her insurance carrier. She is aware that she may receive a bill and has provided verbal consent to proceed: Yes. Assessment & Plan:   Diagnoses and all orders for this visit:    1. Diabetes mellitus type 2 in obese ([address]- last A1c running too high so will start on Victoza and help with obesity as well  -     liraglutide (VICTOZA) 0.6 mg/0.1 mL (18 mg/3 mL) pnij; 0.6 mg by SubCUTAneous route daily. 2. Body mass index (BMI) of 50.0 to 59.9 in adult (HCC)-restarting phentermine and encouraged continued work on diet and exercise  -     liraglutide (VICTOZA) 0.6 mg/0.1 mL (18 mg/3 mL) pnij; 0.6 mg by SubCUTAneous route daily. -     phentermine (ADIPEX-P) 37.5 mg tablet; Take 1 Tab by mouth every morning. Max Daily Amount: 37.5 mg. Follow-up and Dispositions    · Return in about 6 weeks (around 6/4/2020). 712  Subjective:   Stefania Zhang is a 47 y.o. female who was seen for Follow-up (3 month /Recently seen @ [facility] for tooth abcess)    Obesity - No longer taking phentermine - last used this about 6/2019. Weight has changed but not sure. Clothes are fitting are more looser. Knee pain has been improving but not exercising regularly. Improving diet and eating healthier foods at home. She does have diabetes and was previously well controlled but last A1c up to 8.1%. She was unable to tolerate metformin due to GI side effects.     Lab Results   Component Value Date/Time    Hemoglobin A1c (POC) 6.5 03/30/2016 10:46 AM    Hemoglobin A1c (POC) 6.4 10/28/2015 12:06 PM    Hemoglobin A1c 8.1 (H) 03/03/2020 09:52 AM    Microalb/Creat ratio (ug/mg creat.) <2.3 04/03/2019 12:30 PM    LDL, calculated 37 08/08/2019 09:21 AM    Creatinine 0.76 03/03/2020 09:52 AM      Lab Results   Component Value Date/Time    GFR est  03/03/2020 09:52 AM    GFR est non- 03/03/2020 09:52 AM      Lab Results   Component Value Date/Time    TSH 2.660 08/08/2019 09:21 AM         Prior to Admission medications    Medication Sig Start Date End Date Taking? Authorizing Provider   trimethoprim-sulfamethoxazole (BACTRIM DS, SEPTRA DS) 160-800 mg per tablet  4/21/20  Yes Provider, Historical   cephALEXin (KEFLEX) 250 mg capsule  4/21/20  Yes Provider, Historical   indomethacin (INDOCIN) 50 mg capsule  4/21/20  Yes Provider, Historical   liraglutide (VICTOZA) 0.6 mg/0.1 mL (18 mg/3 mL) pnij 0.6 mg by SubCUTAneous route daily. 4/23/20  Yes Kaylee Troy MD   phentermine (ADIPEX-P) 37.5 mg tablet Take 1 Tab by mouth every morning. Max Daily Amount: 37.5 mg. 4/23/20  Yes Kaylee Troy MD   montelukast (SINGULAIR) 10 mg tablet Take 1 Tab by mouth daily. 3/18/20  Yes Kaylee Troy MD   multivitamin (ONE A DAY) tablet Take 1 Tab by mouth daily. Yes Provider, Historical   cranberry extract 450 mg tab tablet Take 450 mg by mouth daily. Yes Provider, Historical   albuterol (PROVENTIL HFA, VENTOLIN HFA, PROAIR HFA) 90 mcg/actuation inhaler Take 2 Puffs by inhalation every four (4) hours as needed for Wheezing. 1/30/17  Yes Kaylee Troy MD   hydroquinone (ESOTERICA, MELQUIN) 4 % topical cream Apply  to affected area two (2) times a day. 3/3/20   Kaylee Troy MD   dapagliflozin (FARXIGA) 5 mg tab tablet Take 1 Tab by mouth daily.  3/3/20   Kaylee Troy MD     Allergies   Allergen Reactions    Metformin Other (comments)     Hair loss and unstable blood sugars    Pcn [Penicillins] Swelling       Patient Active Problem List    Diagnosis Date Noted    Transient ischemic attack involving right internal carotid artery 07/18/2016    Blurred vision, left eye 07/15/2016    Weakness of left side of body 07/15/2016    TIA (transient ischemic attack) 07/14/2016    Hypertension 07/14/2016    Ex-smoker 07/14/2016    Morbid obesity ([facility] [address] 10/28/2014    Diabetes mellitus type 2 in obese -- Diet controlled 10/28/2014     Past Medical History:   Diagnosis Date    Asthma     Diabetes ([facility] [address]     Diabetes mellitus type 2 in obese -- Diet controlled 10/28/2014    Morbid obesity ([facility] 10/28/2014       ROS  Gen - no fever/chills  Resp - no dyspnea or cough  CV - no chest pain or DURON  Rest per HPI      Objective:   Vital Signs: (As obtained by patient/caregiver at home)  Visit Vitals  [facility] 03/17/2020      Physical exam:  General appearance - alert, well appearing, and in no distress  Eyes -sclera anicteric, no discharge  HEENT- normocephalic, atraumatic, moist mucous membranes, no visualized neck mass  Chest -normal respiratory effort, no visualized signs of respiratory distress  Neurological - alert, awake, normal speech, no focal findings or movement disorder noted  Psych - normal mood and affect  Skin- no apparent lesions    We discussed the expected course, resolution and complications of the diagnosis(es) in detail. Medication risks, benefits, costs, interactions, and alternatives were discussed as indicated. I advised her to contact the office if her condition worsens, changes or fails to improve as anticipated. She expressed understanding with the diagnosis(es) and plan. Stefania Zhang is a 47 y.o. female being evaluated by a video visit encounter for concerns as above. A caregiver was present when appropriate. Due to this being a TeleHealth encounter (During KIPRN-95 public health emergency), evaluation of the following organ systems was limited: Vitals/Constitutional/EENT/Resp/CV/GI//MS/Neuro/Skin/Heme-Lymph-Imm.   Pursuant to the emergency declaration under the [facility], [facility]5 waiver authority and the [employer] and [employer] Act, this Virtual  Visit was conducted, with patient's (and/or legal guardian's) consent, to reduce the patient's risk of exposure to COVID-19 and provide necessary medical care. Services were provided through a video synchronous discussion virtually to substitute for in-person clinic visit. Patient and provider were located at their individual homes.         Sherman Jauregui MD

## 2020-05-08 DIAGNOSIS — J45.901 MILD ASTHMA EXACERBATION: ICD-10-CM

## 2020-05-08 RX ORDER — MONTELUKAST SODIUM 10 MG/1
10 TABLET ORAL DAILY
Qty: 30 TAB | Refills: 2 | Status: SHIPPED | OUTPATIENT
Start: 2020-05-08 | End: 2020-08-31 | Stop reason: SDUPTHER

## 2020-05-08 RX ORDER — ALBUTEROL SULFATE 90 UG/1
2 AEROSOL, METERED RESPIRATORY (INHALATION)
Qty: 1 INHALER | Refills: 5 | Status: SHIPPED | OUTPATIENT
Start: 2020-05-08 | End: 2020-07-20 | Stop reason: ALTCHOICE

## 2020-06-10 ENCOUNTER — VIRTUAL VISIT (OUTPATIENT)
Dept: FAMILY MEDICINE CLINIC | Age: 40
End: 2020-06-10

## 2020-06-10 DIAGNOSIS — E66.9 DIABETES MELLITUS TYPE 2 IN OBESE (HCC): ICD-10-CM

## 2020-06-10 DIAGNOSIS — E11.69 DIABETES MELLITUS TYPE 2 IN OBESE (HCC): ICD-10-CM

## 2020-06-10 DIAGNOSIS — J45.901 MILD ASTHMA EXACERBATION: Primary | ICD-10-CM

## 2020-06-10 RX ORDER — FLUTICASONE PROPIONATE AND SALMETEROL 250; 50 UG/1; UG/1
1 POWDER RESPIRATORY (INHALATION) EVERY 12 HOURS
Qty: 1 INHALER | Refills: 1 | Status: SHIPPED | OUTPATIENT
Start: 2020-06-10 | End: 2020-06-25 | Stop reason: SDUPTHER

## 2020-06-10 RX ORDER — SEMAGLUTIDE 1.34 MG/ML
0.25 INJECTION, SOLUTION SUBCUTANEOUS
Qty: 1 BOX | Refills: 0 | Status: SHIPPED | OUTPATIENT
Start: 2020-06-10 | End: 2020-06-15

## 2020-06-10 NOTE — PROGRESS NOTES
Charly Curry is a 47 y.o. female who was seen by synchronous (real-time) audio-video technology on 6/10/2020. Consent: Charly Curry, who was seen by synchronous (real-time) audio-video technology, and/or her healthcare decision maker, is aware that this patient-initiated, Telehealth encounter on 6/10/2020 is a billable service, with coverage as determined by her insurance carrier. She is aware that she may receive a bill and has provided verbal consent to proceed: Yes. Assessment & Plan:   Diagnoses and all orders for this visit:    1. Mild asthma exacerbation- will use wixela. Other symptoms consistent with laryngitis associated improve over time especially with voice rest, fluids, and rest.  -     fluticasone propion-salmeteroL (Wixela Inhub) 250-50 mcg/dose diskus inhaler; Take 1 Puff by inhalation every twelve (12) hours. 2. Diabetes mellitus type 2 in obese ([facility]- cost of Victoza very high so we will try Ozempic instead  -     semaglutide (Ozempic) 0.25 mg/0.2 mL (2 mg/1.5 mL) sub-q pen; 0.25 mg by SubCUTAneous route every seven (7) days for 30 days. Follow-up and Dispositions    · Return if symptoms worsen or fail to improve. Subjective:   Charly Curry is a 47 y.o. female who was seen for Nasal Congestion (and follow-up)    Recent asthma flare and improved with prednisone. Continues to use albuterol PRN and daily Singulair.  + hoarseness, coughing, rhinorrhea, chest congestion, mild dyspnea for the past few days. Denies any fever, myalgias, malaise, headaches. Denies any COVID-19 contacts. Continues working from home. Avoids leaving house regularly. Also reports unable to get Victoza due to very high cost even when running with coupon. Prior to Admission medications    Medication Sig Start Date End Date Taking? Authorizing Provider   fluticasone propion-salmeteroL (Wixela Inhub) 250-50 mcg/dose diskus inhaler Take 1 Puff by inhalation every twelve (12) hours. 6/10/20  Yes Jeannie Staff, MD   semaglutide (Ozempic) 0.25 mg/0.2 mL (2 mg/1.5 mL) sub-q pen 0.25 mg by SubCUTAneous route every seven (7) days for 30 days. 6/10/20 7/10/20 Yes Jeannie Staff, MD   albuterol (PROVENTIL HFA, VENTOLIN HFA, PROAIR HFA) 90 mcg/actuation inhaler Take 2 Puffs by inhalation every four (4) hours as needed for Wheezing. 5/8/20  Yes Jeannie Staff, MD   montelukast (SINGULAIR) 10 mg tablet Take 1 Tab by mouth daily. 5/8/20  Yes Jeannie Staff, MD   phentermine (ADIPEX-P) 37.5 mg tablet Take 1 Tab by mouth every morning. Max Daily Amount: 37.5 mg. 4/23/20  Yes Jeannie Staff, MD   hydroquinone (ESOTERICA, MELQUIN) 4 % topical cream Apply  to affected area two (2) times a day. 3/3/20  Yes Jeannie Hernandez MD   multivitamin (ONE A DAY) tablet Take 1 Tab by mouth daily. Yes Provider, Historical   cranberry extract 450 mg tab tablet Take 450 mg by mouth daily. Yes Provider, Historical   trimethoprim-sulfamethoxazole (BACTRIM DS, SEPTRA DS) 160-800 mg per tablet  4/21/20 6/10/20  Provider, Historical   cephALEXin (KEFLEX) 250 mg capsule  4/21/20 6/10/20  Provider, Historical   indomethacin (INDOCIN) 50 mg capsule  4/21/20 6/10/20  Provider, Historical   liraglutide (VICTOZA) 0.6 mg/0.1 mL (18 mg/3 mL) pnij 0.6 mg by SubCUTAneous route daily. 4/23/20 6/10/20  Jeannie Hernandez MD   dapagliflozin (FARXIGA) 5 mg tab tablet Take 1 Tab by mouth daily.  3/3/20   Jeannie Hernandez MD     Allergies   Allergen Reactions    Metformin Other (comments)     Hair loss and unstable blood sugars    Pcn [Penicillins] Swelling       Patient Active Problem List    Diagnosis Date Noted    Transient ischemic attack involving right internal carotid artery 07/18/2016    Blurred vision, left eye 07/15/2016    Weakness of left side of body 07/15/2016    TIA (transient ischemic attack) 07/14/2016    Hypertension 07/14/2016    Ex-smoker 07/14/2016    Morbid [facility] ([facility] 10/28/2014    Diabetes mellitus type 2 in obese -- Diet controlled 10/28/2014     Past Medical History:   Diagnosis Date    Asthma     Diabetes ([facility]     Diabetes mellitus type 2 in obese -- Diet controlled 10/28/2014    Morbid obesity ([facility] 10/28/2014       ROS  Gen - no fever/chills  Resp -Per HPI  CV - no chest pain or DURON  Rest per HPI      Objective:   Vital Signs: (As obtained by patient/caregiver at home)  There were no vitals taken for this visit. Physical exam:  General appearance - alert, well appearing, and in no distress  Eyes -sclera anicteric, no discharge  HEENT- normocephalic, atraumatic, moist mucous membranes, no visualized neck mass  Chest -normal respiratory effort, no visualized signs of respiratory distress  Neurological - alert, awake, normal speech, no focal findings or movement disorder noted  Psych - normal mood and affect  Skin- no apparent lesions    We discussed the expected course, resolution and complications of the diagnosis(es) in detail. Medication risks, benefits, costs, interactions, and alternatives were discussed as indicated. I advised her to contact the office if her condition worsens, changes or fails to improve as anticipated. She expressed understanding with the diagnosis(es) and plan. Lary Dai is a 47 y.o. female who was evaluated by a video visit encounter for concerns as above. Patient identification was verified prior to start of the visit. A caregiver was present when appropriate. Due to this being a TeleHealth encounter (During ULJWK-31 public health emergency), evaluation of the following organ systems was limited: Vitals/Constitutional/EENT/Resp/CV/GI//MS/Neuro/Skin/Heme-Lymph-Imm.   Pursuant to the emergency declaration under the [facility], 1135 waiver authority and the [employer] and [employer] Act, this Virtual  Visit was conducted, with patient's (and/or legal guardian's) consent, to reduce the patient's risk of exposure to COVID-19 and provide necessary medical care. Services were provided through a video synchronous discussion virtually to substitute for in-person clinic visit. Patient and provider were located at their individual homes.       Cy Cross MD

## 2020-06-15 ENCOUNTER — TELEPHONE (OUTPATIENT)
Dept: FAMILY MEDICINE CLINIC | Age: 40
End: 2020-06-15

## 2020-06-15 DIAGNOSIS — J45.901 MILD ASTHMA EXACERBATION: Primary | ICD-10-CM

## 2020-06-15 RX ORDER — FLUTICASONE PROPIONATE AND SALMETEROL 250; 50 UG/1; UG/1
1 POWDER RESPIRATORY (INHALATION) EVERY 12 HOURS
Qty: 3 INHALER | Refills: 0 | Status: SHIPPED | OUTPATIENT
Start: 2020-06-15 | End: 2020-12-07 | Stop reason: ALTCHOICE

## 2020-06-15 RX ORDER — DULAGLUTIDE 0.75 MG/.5ML
0.75 INJECTION, SOLUTION SUBCUTANEOUS
Qty: 4 PEN | Refills: 1 | Status: SHIPPED | OUTPATIENT
Start: 2020-06-15 | End: 2020-06-25

## 2020-06-15 NOTE — TELEPHONE ENCOUNTER
----- Message from Mari Del Cid sent at 6/12/2020  4:28 PM EDT -----  Regarding: Dr. Joao Hu Message/Vendor Calls    Caller's first and last name: Lyricjuwan Glasgow w/[employer]      Reason for call: Gulshan Patel requested Prior Authorization and an error message was given stating not accepted by plan. Fax has been sent to resubmit to pt's plan, [employer], or call [employer] at [phone].       Callback required yes/no and why: No      Best contact number(s): [phone] ref pickard [email or web address]      Details to clarify the request:      Mari Del Cid

## 2020-06-15 NOTE — TELEPHONE ENCOUNTER
Spoke with Velvet Frazier and advised attempts to call patient plan unsuccessful. Patient was advised and will call insurance company.

## 2020-06-25 ENCOUNTER — VIRTUAL VISIT (OUTPATIENT)
Dept: FAMILY MEDICINE CLINIC | Age: 40
End: 2020-06-25

## 2020-06-25 DIAGNOSIS — J45.20 MILD INTERMITTENT ASTHMA WITHOUT COMPLICATION: ICD-10-CM

## 2020-06-25 DIAGNOSIS — E66.9 DIABETES MELLITUS TYPE 2 IN OBESE (HCC): Primary | ICD-10-CM

## 2020-06-25 DIAGNOSIS — E11.69 DIABETES MELLITUS TYPE 2 IN OBESE (HCC): Primary | ICD-10-CM

## 2020-06-25 RX ORDER — PHENTERMINE HYDROCHLORIDE 37.5 MG/1
37.5 TABLET ORAL
Qty: 30 TAB | Refills: 1 | Status: SHIPPED | OUTPATIENT
Start: 2020-06-25 | End: 2020-08-17

## 2020-06-25 RX ORDER — ASCORBIC ACID 500 MG
500 TABLET ORAL DAILY
COMMUNITY

## 2020-06-25 NOTE — PROGRESS NOTES
Consent: Brandy Mcgarry, who was seen by synchronous (real-time) audio-video technology, and/or her healthcare decision maker, is aware that this patient-initiated, Telehealth encounter on 6/25/2020 is a billable service, with coverage as determined by her insurance carrier. She is aware that she may receive a bill and has provided verbal consent to proceed: Yes. Assessment & Plan:   Diagnoses and all orders for this visit:    1. Diabetes mellitus type 2 in obese [facility])- we will see if patient can start on Invokana. Weight coming down with phentermine and lifestyle changes. Unable to tolerate metformin and would like to use medication to help with diabetes in the setting of obesity  -     canagliflozin (INVOKANA) 100 mg tablet; Take 1 Tab by mouth Daily (before breakfast). 2. Body mass index (BMI) of 50.0 to 59.9 in adult (HCC)-improving with phentermine and lifestyle changes. -     phentermine (ADIPEX-P) 37.5 mg tablet; Take 1 Tab by mouth every morning. Max Daily Amount: 37.5 mg.    3. Mild intermittent asthma without complication- stable. Some concerns about mold in the home so patient investigating this. Continue current meds including Singulair. Follow-up and Dispositions    · Return in about 3 months (around 9/25/2020), or if symptoms worsen or fail to improve. 712  Subjective:   Brandy Mcgarry is a 47 y.o. female who was seen for Asthma (2 week follow-up)    Obesity - Phentermine - started this again about 4/2020. Victoza was not covered by insurance. Clothes are fitting are more looser. Improving diet and eating healthier foods at home. Diabetes Mellitus:  previously well controlled but last A1c up to 8.1%. She was unable to tolerate metformin due to GI side effects. Victoza and Carter Nallely too costly. Reports no polyuria or polydipsia, no numbness, tingling or pain in extremities, No chest pain. Walking dog more frequently. Getting some dyspnea but improving.   Major changes - meal prep. Pt is a non smoker. Lab Results   Component Value Date/Time    Hemoglobin A1c (POC) 6.5 03/30/2016 10:46 AM    Hemoglobin A1c (POC) 6.4 10/28/2015 12:06 PM    Hemoglobin A1c 8.1 (H) 03/03/2020 09:52 AM    Microalb/Creat ratio (ug/mg creat.) <2.3 04/03/2019 12:30 PM    LDL, calculated 37 08/08/2019 09:21 AM    Creatinine 0.76 03/03/2020 09:52 AM      Lab Results   Component Value Date/Time    GFR est  03/03/2020 09:52 AM    GFR est non- 03/03/2020 09:52 AM      Lab Results   Component Value Date/Time    TSH 2.660 08/08/2019 09:21 AM         Prior to Admission medications    Medication Sig Start Date End Date Taking? Authorizing Provider   phentermine (ADIPEX-P) 37.5 mg tablet Take 1 Tab by mouth every morning. Max Daily Amount: 37.5 mg. 6/25/20  Yes Neeta Will MD   ascorbic acid, vitamin C, (Vitamin C) 500 mg tablet Take 500 mg by mouth daily. Yes Provider, Historical   canagliflozin (INVOKANA) 100 mg tablet Take 1 Tab by mouth Daily (before breakfast). 6/25/20  Yes Neeta Will MD   fluticasone propion-salmeteroL (ADVAIR/WIXELA) 250-50 mcg/dose diskus inhaler Take 1 Puff by inhalation every twelve (12) hours. 6/15/20  Yes Neeta Will MD   albuterol (PROVENTIL HFA, VENTOLIN HFA, PROAIR HFA) 90 mcg/actuation inhaler Take 2 Puffs by inhalation every four (4) hours as needed for Wheezing. 5/8/20  Yes Neeta Will MD   montelukast (SINGULAIR) 10 mg tablet Take 1 Tab by mouth daily. 5/8/20  Yes Neeta Will MD   hydroquinone (ESOTERICA, MELQUIN) 4 % topical cream Apply  to affected area two (2) times a day. 3/3/20  Yes Neeta Will MD   multivitamin (ONE A DAY) tablet Take 1 Tab by mouth daily. Yes Provider, Historical   cranberry extract 450 mg tab tablet Take 450 mg by mouth daily. Yes Provider, Historical   dulaglutide (Trulicity) 3.90 NM/3.7 mL sub-q pen 0.5 mL by SubCUTAneous route every seven (7) days.  6/15/20 6/25/20  January Conley MD S[facility]NCHEZ   fluticasone propion-salmeteroL (Wixela Inhub) 250-50 mcg/dose diskus inhaler Take 1 Puff by inhalation every twelve (12) hours. 6/10/20 6/25/20  Johanna Christian MD   phentermine (ADIPEX-P) 37.5 mg tablet Take 1 Tab by mouth every morning. Max Daily Amount: 37.5 mg. 4/23/20 6/25/20  Joahnna Christian MD   dapagliflozin (FARXIGA) 5 mg tab tablet Take 1 Tab by mouth daily. 3/3/20 6/25/20  Johanna Christian MD     Allergies   Allergen Reactions    Metformin Other (comments)     Hair loss and unstable blood sugars    Pcn [Penicillins] Swelling       Patient Active Problem List    Diagnosis Date Noted    Transient ischemic attack involving right internal carotid artery 07/18/2016    Blurred vision, left eye 07/15/2016    Weakness of left side of body 07/15/2016    TIA (transient ischemic attack) 07/14/2016    Hypertension 07/14/2016    Ex-smoker 07/14/2016    Morbid obesity ([address] 10/28/2014    Diabetes mellitus type 2 in obese -- Diet controlled 10/28/2014     Past Medical History:   Diagnosis Date    Asthma     Diabetes ([address]     Diabetes mellitus type 2 in obese -- Diet controlled 10/28/2014    Morbid obesity ([address] 10/28/2014       ROS  Gen - no fever/chills  Resp - no dyspnea or cough  CV - no chest pain or DURON  Rest per HPI      Objective:   Vital Signs: (As obtained by patient/caregiver at home)  There were no vitals taken for this visit. Physical exam:  General appearance - alert, well appearing, and in no distress  Eyes -sclera anicteric, no discharge  HEENT- normocephalic, atraumatic, moist mucous membranes, no visualized neck mass  Chest -normal respiratory effort, no visualized signs of respiratory distress  Neurological - alert, awake, normal speech, no focal findings or movement disorder noted  Psych - normal mood and affect  Skin- no apparent lesions    We discussed the expected course, resolution and complications of the diagnosis(es) in detail.   Medication risks, benefits, costs, interactions, and alternatives were discussed as indicated. I advised her to contact the office if her condition worsens, changes or fails to improve as anticipated. She expressed understanding with the diagnosis(es) and plan. Cleopatra Oconnell is a 47 y.o. female being evaluated by a video visit encounter for concerns as above. A caregiver was present when appropriate. Due to this being a TeleHealth encounter (During [facility] public health emergency), evaluation of the following organ systems was limited: Vitals/Constitutional/EENT/Resp/CV/GI//MS/Neuro/Skin/Heme-Lymph-Imm. Pursuant to the emergency declaration under the [facility] waiver authority and the [employer] Act, this Virtual  Visit was conducted, with patient's (and/or legal guardian's) consent, to reduce the patient's risk of exposure to COVID-19 and provide necessary medical care. Services were provided through a video synchronous discussion virtually to substitute for in-person clinic visit. Patient and provider were located at their individual homes.         Oly Kern MD

## 2020-06-26 DIAGNOSIS — E11.69 DIABETES MELLITUS TYPE 2 IN OBESE (HCC): ICD-10-CM

## 2020-06-26 DIAGNOSIS — E66.9 DIABETES MELLITUS TYPE 2 IN OBESE (HCC): ICD-10-CM

## 2020-06-26 RX ORDER — DULAGLUTIDE 0.75 MG/.5ML
0.75 INJECTION, SOLUTION SUBCUTANEOUS
Qty: 4 SYRINGE | Refills: 2 | Status: SHIPPED | OUTPATIENT
Start: 2020-06-26 | End: 2020-12-07 | Stop reason: ALTCHOICE

## 2020-07-20 ENCOUNTER — VIRTUAL VISIT (OUTPATIENT)
Dept: FAMILY MEDICINE CLINIC | Age: 40
End: 2020-07-20

## 2020-07-20 DIAGNOSIS — Z79.899 ENCOUNTER FOR LONG-TERM (CURRENT) USE OF MEDICATIONS: ICD-10-CM

## 2020-07-20 DIAGNOSIS — Z09 HOSPITAL DISCHARGE FOLLOW-UP: Primary | ICD-10-CM

## 2020-07-20 DIAGNOSIS — E66.9 DIABETES MELLITUS TYPE 2 IN OBESE (HCC): ICD-10-CM

## 2020-07-20 DIAGNOSIS — E11.69 DIABETES MELLITUS TYPE 2 IN OBESE (HCC): ICD-10-CM

## 2020-07-20 DIAGNOSIS — J45.21 MILD INTERMITTENT ASTHMA WITH EXACERBATION: ICD-10-CM

## 2020-07-20 RX ORDER — INSULIN LISPRO 100 [IU]/ML
5 INJECTION, SOLUTION INTRAVENOUS; SUBCUTANEOUS
COMMUNITY
Start: 2020-07-15 | End: 2020-10-27 | Stop reason: SDUPTHER

## 2020-07-20 RX ORDER — SYRING-NEEDL,DISP,INSUL,0.3 ML 30 GX5/16"
SYRINGE, EMPTY DISPOSABLE MISCELLANEOUS
COMMUNITY
Start: 2020-07-15 | End: 2020-12-17 | Stop reason: SDUPTHER

## 2020-07-20 RX ORDER — INSULIN HUMAN 100 [IU]/ML
INJECTION, SUSPENSION SUBCUTANEOUS
COMMUNITY
Start: 2020-07-17 | End: 2020-09-18 | Stop reason: ALTCHOICE

## 2020-07-20 RX ORDER — PREDNISONE 20 MG/1
TABLET ORAL
COMMUNITY
Start: 2020-07-15 | End: 2020-07-20 | Stop reason: ALTCHOICE

## 2020-07-20 RX ORDER — LISINOPRIL 10 MG/1
TABLET ORAL
COMMUNITY
Start: 2020-07-15 | End: 2020-10-28 | Stop reason: SDUPTHER

## 2020-07-20 RX ORDER — ALBUTEROL SULFATE 90 UG/1
2 POWDER, METERED RESPIRATORY (INHALATION)
COMMUNITY
Start: 2020-07-13 | End: 2020-09-03 | Stop reason: ALTCHOICE

## 2020-07-20 RX ORDER — AZITHROMYCIN 250 MG/1
TABLET, FILM COATED ORAL
COMMUNITY
Start: 2020-07-15 | End: 2020-07-20 | Stop reason: ALTCHOICE

## 2020-07-20 NOTE — PROGRESS NOTES
Anaid Cox is a 47 y.o. female who was seen by synchronous (real-time) audio-video technology on 7/20/2020 for Hospital Follow Up (Discharge from [address] [place] [address] on 7/15/20 Asthma/DM)        Assessment & Plan:   Diagnoses and all orders for this visit:    1. Hospital discharge [id number] to recover fairly well. Brief note from Dr. Thompson Phlegm  -     CBC W/O DIFF  -     METABOLIC PANEL, COMPREHENSIVE  -     URINALYSIS W/ RFLX MICROSCOPIC    2. Mild intermittent asthma with exacerbation  -     CBC W/O DIFF  -     METABOLIC PANEL, COMPREHENSIVE  -     URINALYSIS W/ RFLX MICROSCOPIC    3. Diabetes mellitus type 2 in obese (HCC)  -     CBC W/O DIFF  -     METABOLIC PANEL, COMPREHENSIVE  -     URINALYSIS W/ RFLX MICROSCOPIC    4. Encounter for long-term (current) use of medications  -     CBC W/O DIFF  -     METABOLIC PANEL, COMPREHENSIVE  -     URINALYSIS W/ RFLX MICROSCOPIC      Follow-up and Dispositions    · Return in about 4 weeks (around 8/17/2020), or if symptoms worsen or fail to improve. Subjective:     Hospital admission:    Admitted from 7/14/2020-7/15/2020 at [facility].  Patient was contacted by my nurse on 7/15/2020 and 7/16/2022 reviewed medical changes. She had a sig asthma attack and ended up going to the ER and being put on steroids. She had severe hyperglycemia after this and returned to the hospital for admission. She was discharged on insulin. Finished prednisone. Brief hospitalist notes that were sent to me report a diagnosis of severe persistent asthma with an acute exacerbation, lactic acidosis, type 2 diabetes mellitus with hyperglycemia, and acute kidney injury. She is unclear on the details. Had a few COVID tests that were negative. Thinks she had sepsis and PAT but does not recall. Sent home on NPH 70 units daily and insulin R 10 units with meals.     Diabetes Mellitus:  Taking meds, home glucose monitoring: fasting values range 120-140s, nonfasting values range 200-300  Reports no polyuria or polydipsia, no chest pain, dyspnea or TIA's, no numbness, tingling or pain in extremities  Not working on exercise  Working on diet a lot recently  Pt is a non smoker. Lab Results   Component Value Date/Time    Hemoglobin A1c (POC) 6.5 03/30/2016 10:46 AM    Hemoglobin A1c (POC) 6.4 10/28/2015 12:06 PM    Hemoglobin A1c 8.1 (H) 03/03/2020 09:52 AM    Microalb/Creat ratio (ug/mg creat.) <2.3 04/03/2019 12:30 PM    LDL, calculated 37 08/08/2019 09:21 AM    Creatinine 0.76 03/03/2020 09:52 AM      Lab Results   Component Value Date/Time    GFR est  03/03/2020 09:52 AM    GFR est non- 03/03/2020 09:52 AM      Lab Results   Component Value Date/Time    TSH 2.660 08/08/2019 09:21 AM         Prior to Admission medications    Medication Sig Start Date End Date Taking? Authorizing Provider   lisinopriL (PRINIVIL, ZESTRIL) 10 mg tablet  7/15/20  Yes Provider, Historical   HumaLOG U-100 Insulin 100 unit/mL injection 10 Units. 7/15/20  Yes Provider, Historical   TRUEplus Insulin 1 mL 31 gauge x 5/16 syrg  7/15/20  Yes Provider, Historical   ProAir RespiClick 90 mcg/actuation breath activated inhaler Take 2 Puffs by inhalation every four (4) hours as needed. 7/13/20  Yes Provider, Historical   canagliflozin (INVOKANA) 100 mg tablet Take 1 Tab by mouth Daily (before breakfast). 6/26/20  Yes Clint Tracy MD   ascorbic acid, vitamin C, (Vitamin C) 500 mg tablet Take 500 mg by mouth daily. Yes Provider, Historical   fluticasone propion-salmeteroL (ADVAIR/WIXELA) 250-50 mcg/dose diskus inhaler Take 1 Puff by inhalation every twelve (12) hours. 6/15/20  Yes Clint Tracy MD   montelukast (SINGULAIR) 10 mg tablet Take 1 Tab by mouth daily. 5/8/20  Yes Clint Tracy MD   hydroquinone (ESOTERICA, MELQUIN) 4 % topical cream Apply  to affected area two (2) times a day. 3/3/20  Yes Clint Tracy MD   multivitamin (ONE A DAY) tablet Take 1 Tab by mouth daily.    Yes Provider, Historical   cranberry extract 450 mg tab tablet Take 450 mg by mouth daily. Yes Provider, Historical   HumuLIN N NPH U-100 Insulin 100 unit/mL injection  7/17/20   Provider, Historical   dulaglutide (Trulicity) 2.52 TS/6.0 mL sub-q pen 0.5 mL by SubCUTAneous route every seven (7) days. 6/26/20   Kelsea Larose MD   phentermine (ADIPEX-P) 37.5 mg tablet Take 1 Tab by mouth every morning. Max Daily Amount: 37.5 mg. 6/25/20   Kelsea Larose MD     Patient Active Problem List   Diagnosis Code    Morbid obesity ([facility] E66.01    Diabetes mellitus type 2 in obese -- Diet controlled E11.69, E66.9    TIA (transient ischemic attack) G45.9    Hypertension I10    Ex-smoker Z87.891    Blurred vision, left eye H53.8    Weakness of left side of body R53.1    Transient ischemic attack involving right internal carotid artery G45.1     Past Medical History:   Diagnosis Date    Asthma     Diabetes ([facility]     Diabetes mellitus type 2 in obese -- Diet controlled 10/28/2014    Morbid obesity ([facility] 10/28/2014     ROS  Gen - no fever/chills  Resp - no dyspnea or cough  CV - no chest pain or DURON  Rest per HPI    Objective:     Patient-Reported Vitals 7/20/2020   Patient-Reported Temperature 97.8        Physical exam:  General appearance - alert, well appearing, and in no distress  Eyes -sclera anicteric, no discharge  HEENT- normocephalic, atraumatic, moist mucous membranes, no visualized neck mass  Chest -normal respiratory effort, no visualized signs of respiratory distress  Neurological - alert, awake, normal speech, no focal findings or movement disorder noted  Psych - normal mood and affect  Skin- no apparent lesions    We discussed the expected course, resolution and complications of the diagnosis(es) in detail. Medication risks, benefits, costs, interactions, and alternatives were discussed as indicated.   I advised her to contact the office if her condition worsens, changes or fails to improve as anticipated. She expressed understanding with the diagnosis(es) and plan. [facility], who was evaluated through a patient-initiated, synchronous (real-time) audio-video encounter, and/or her healthcare decision maker, is aware that it is a billable service, with coverage as determined by her insurance carrier. She provided verbal consent to proceed: Yes, and patient identification was verified. It was conducted pursuant to the emergency declaration under the [address] [place] [address] and the [employer] Act. A caregiver was present when appropriate. Ability to conduct physical exam was limited. I was at home. The patient was at home.       Kelvin Chavez MD

## 2020-08-12 DIAGNOSIS — J45.41 MODERATE PERSISTENT ASTHMA WITH ACUTE EXACERBATION: ICD-10-CM

## 2020-08-12 DIAGNOSIS — J45.21 MILD INTERMITTENT ASTHMA WITH EXACERBATION: Primary | ICD-10-CM

## 2020-08-13 RX ORDER — IPRATROPIUM BROMIDE AND ALBUTEROL SULFATE 2.5; .5 MG/3ML; MG/3ML
3 SOLUTION RESPIRATORY (INHALATION)
Qty: 30 NEBULE | Refills: 0 | Status: SHIPPED | OUTPATIENT
Start: 2020-08-13 | End: 2020-10-28 | Stop reason: ALTCHOICE

## 2020-08-17 ENCOUNTER — VIRTUAL VISIT (OUTPATIENT)
Dept: FAMILY MEDICINE CLINIC | Age: 40
End: 2020-08-17
Payer: COMMERCIAL

## 2020-08-17 DIAGNOSIS — J45.51 SEVERE PERSISTENT ASTHMA WITH EXACERBATION: Primary | ICD-10-CM

## 2020-08-17 DIAGNOSIS — E66.9 DIABETES MELLITUS TYPE 2 IN OBESE (HCC): ICD-10-CM

## 2020-08-17 DIAGNOSIS — E11.69 DIABETES MELLITUS TYPE 2 IN OBESE (HCC): ICD-10-CM

## 2020-08-17 DIAGNOSIS — E66.01 MORBID OBESITY (HCC): ICD-10-CM

## 2020-08-17 PROCEDURE — 99214 OFFICE O/P EST MOD 30 MIN: CPT | Performed by: FAMILY MEDICINE

## 2020-08-17 RX ORDER — BUDESONIDE 1 MG/2ML
1000 INHALANT ORAL 2 TIMES DAILY
Qty: 60 EACH | Refills: 2 | Status: SHIPPED | OUTPATIENT
Start: 2020-08-17 | End: 2020-09-18 | Stop reason: SDUPTHER

## 2020-08-17 RX ORDER — PREDNISONE 10 MG/1
TABLET ORAL
Qty: 13 TAB | Refills: 0 | Status: SHIPPED | OUTPATIENT
Start: 2020-08-17 | End: 2020-09-03 | Stop reason: ALTCHOICE

## 2020-08-17 RX ORDER — PREDNISONE 5 MG/1
5 TABLET ORAL DAILY
Qty: 30 TAB | Refills: 0 | Status: SHIPPED | OUTPATIENT
Start: 2020-08-17 | End: 2020-09-18 | Stop reason: ALTCHOICE

## 2020-08-17 NOTE — PROGRESS NOTES
Jackelin Elizalde is a 47 y.o. female who was seen by synchronous (real-time) audio-video technology on 8/17/2020 for Asthma (Follow-up ER Visit)        Assessment & Plan:   Diagnoses and all orders for this visit:    1. Severe persistent asthma with exacerbation -previously mild intermittent but no worse flares recently in the last 2 months. More concerning sx even after 2 back to back albuterol neb tx. Sending to ER. Planned for another lower dose prednisone taper followed by low dose daily Prednisone and Pulmicort neb BID until Pulm appt in 3 weeks prior to these sx during discussion but will hold on this plan until after ER eval.    -     predniSONE (DELTASONE) 10 mg tablet; Take 3 pills for 2 days, then 2 pills for 2 days, then 1 pill for 3 days  -     predniSONE (DELTASONE) 5 mg tablet; Take 1 Tab by mouth daily. Start after Prednisone taper finishes  -     budesonide (PULMICORT) 1 mg/2 mL nbsp; 2 mL by Nebulization route two (2) times a day for 90 days. 2. Diabetes mellitus type 2 in obese ([address] - sig hyperglycemia related to multiple steroid bursts. Prev well controlled until 3/3/2020 when starting to have issues with this. Need to work on this after dealing with asthma flare    3. Morbid obesity (HCC)-contributor to her asthma flare and worsened by numerous steroid bursts. Continue to work on this and may need to consider bariatric surgery    Follow-up and Dispositions    · Return in about 1 week (around 8/24/2020), or if symptoms worsen or fail to improve. I spent at least 30 minutes on this visit with this established patient. Subjective:     Having a lot more trouble with asthma recently. No flares for > 10 yrs and has now had 3 ER visits with 1 hospitalization in the last 2 months. Recently in ER again at [address] for asthma exacerbation. Put on another round of steroids and was improving. I referred to [facility] and she has an appt in 3 weeks.  Finished prednisone yesterday and now having more coughing and dyspnea today - sx were sig improved prior to today. Tx with albuterol this am       Prev, admitted from 7/14/2020-7/15/2020 at [facility].  She had a sig asthma attack and ended up going to the ER and being put on steroids. She had severe hyperglycemia after this and returned to the hospital for admission. She was discharged on insulin. Brief hospitalist notes that were sent to me report a diagnosis of severe persistent asthma with an acute exacerbation, lactic acidosis, type 2 diabetes mellitus with hyperglycemia, and acute kidney injury. Sent home on NPH 70 units daily and insulin R 10 units with meals but did not continue this as her sugars rapidly improved. Prior to Admission medications    Medication Sig Start Date End Date Taking? Authorizing Provider   predniSONE (DELTASONE) 10 mg tablet Take 3 pills for 2 days, then 2 pills for 2 days, then 1 pill for 3 days 8/17/20  Yes Erik Riggins MD   predniSONE (DELTASONE) 5 mg tablet Take 1 Tab by mouth daily. Start after Prednisone taper finishes 8/17/20  Yes Erik Riggins MD   budesonide (PULMICORT) 1 mg/2 mL nbsp 2 mL by Nebulization route two (2) times a day for 90 days. 8/17/20 11/15/20 Yes Erik Riggins MD   albuterol-ipratropium (DUO-NEB) 2.5 mg-0.5 mg/3 ml nebu 3 mL by Nebulization route every four (4) hours as needed for Wheezing or Shortness of Breath. 8/13/20  Yes Erik Rgigins MD   lisinopriL (PRINIVIL, ZESTRIL) 10 mg tablet  7/15/20  Yes Provider, Historical   HumaLOG U-100 Insulin 100 unit/mL injection 10 Units. 7/15/20  Yes Provider, Historical   TRUEplus Insulin 1 mL 31 gauge x 5/16 syrg  7/15/20  Yes Provider, Historical   ProAir RespiClick 90 mcg/actuation breath activated inhaler Take 2 Puffs by inhalation every four (4) hours as needed. 7/13/20  Yes Provider, Historical   dulaglutide (Trulicity) 6.23 NT/0.0 mL sub-q pen 0.5 mL by SubCUTAneous route every seven (7) days.  6/26/20  Yes Fabienne Morales MD   ascorbic acid, vitamin C, (Vitamin C) 500 mg tablet Take 500 mg by mouth daily. Yes Provider, Historical   fluticasone propion-salmeteroL (ADVAIR/WIXELA) 250-50 mcg/dose diskus inhaler Take 1 Puff by inhalation every twelve (12) hours. 6/15/20  Yes Fabienne Morales MD   montelukast (SINGULAIR) 10 mg tablet Take 1 Tab by mouth daily. 5/8/20  Yes Fabienne Moarles MD   hydroquinone (ESOTERICA, MELQUIN) 4 % topical cream Apply  to affected area two (2) times a day. 3/3/20  Yes Fabienne Morales MD   cranberry extract 450 mg tab tablet Take 450 mg by mouth daily. Yes Provider, Historical   HumuLIN N NPH U-100 Insulin 100 unit/mL injection  7/17/20   Provider, Historical   canagliflozin (INVOKANA) 100 mg tablet Take 1 Tab by mouth Daily (before breakfast). 6/26/20 8/17/20  Fabienne Morales MD   phentermine (ADIPEX-P) 37.5 mg tablet Take 1 Tab by mouth every morning. Max Daily Amount: 37.5 mg. 6/25/20 8/17/20  Fabienne Morales MD   multivitamin (ONE A DAY) tablet Take 1 Tab by mouth daily.   8/17/20  Provider, Historical     Patient Active Problem List   Diagnosis Code    Morbid obesity ([facility] E66.01    Diabetes mellitus type 2 in obese -- Diet controlled E11.69, E66.9    TIA (transient ischemic attack) G45.9    Hypertension I10    Ex-smoker Z87.891    Blurred vision, left eye H53.8    Weakness of left side of body R53.1    Transient ischemic attack involving right internal carotid artery G45.1     Past Medical History:   Diagnosis Date    Asthma     [facility]     Diabetes mellitus type 2 in obese -- Diet controlled 10/28/2014    Morbid obesity ([facility] 10/28/2014       ROS  Gen - no fever/chills  Resp - per HPI  CV - per HPI  Rest per HPI    Objective:     Patient-Reported Vitals 7/20/2020   Patient-Reported Temperature 97.8        Physical exam:  General appearance - alert, well appearing, and in no distress  Eyes -sclera anicteric, no discharge  HEENT- normocephalic, atraumatic, moist mucous membranes, no visualized neck mass  Chest - during conversation, pt was coughing episodically and even after 1 treatment, she was dyspneic with coughing. Using accessory muscle. Pt completed second albuterol treatment and sent to ER  Neurological - alert, awake, normal speech, no focal findings or movement disorder noted  Psych - normal mood and affect  Skin- no apparent lesions    We discussed the expected course, resolution and complications of the diagnosis(es) in detail. Medication risks, benefits, costs, interactions, and alternatives were discussed as indicated. I advised her to contact the office if her condition worsens, changes or fails to improve as anticipated. She expressed understanding with the diagnosis(es) and plan. [facility], who was evaluated through a patient-initiated, synchronous (real-time) audio-video encounter, and/or her healthcare decision maker, is aware that it is a billable service, with coverage as determined by her insurance carrier. She provided verbal consent to proceed: Yes, and patient identification was verified. It was conducted pursuant to the emergency declaration under the [address] [place] [address] authority and the [employer] Act. A caregiver was present when appropriate. Ability to conduct physical exam was limited. I was at home. The patient was at home.       Brendan Mccauley MD

## 2020-08-19 RX ORDER — ALBUTEROL SULFATE 90 UG/1
2 AEROSOL, METERED RESPIRATORY (INHALATION)
Status: CANCELLED | OUTPATIENT
Start: 2020-08-19

## 2020-08-20 RX ORDER — ALBUTEROL SULFATE 90 UG/1
2 AEROSOL, METERED RESPIRATORY (INHALATION)
Qty: 1 INHALER | Refills: 2 | Status: SHIPPED | OUTPATIENT
Start: 2020-08-20 | End: 2020-12-17 | Stop reason: SDUPTHER

## 2020-08-31 DIAGNOSIS — J45.901 MILD ASTHMA EXACERBATION: ICD-10-CM

## 2020-08-31 RX ORDER — MONTELUKAST SODIUM 10 MG/1
10 TABLET ORAL DAILY
Qty: 90 TAB | Refills: 1 | Status: SHIPPED | OUTPATIENT
Start: 2020-08-31 | End: 2021-01-14 | Stop reason: SDUPTHER

## 2020-09-03 ENCOUNTER — VIRTUAL VISIT (OUTPATIENT)
Dept: FAMILY MEDICINE CLINIC | Age: 40
End: 2020-09-03
Payer: COMMERCIAL

## 2020-09-03 DIAGNOSIS — E11.69 DIABETES MELLITUS TYPE 2 IN OBESE (HCC): ICD-10-CM

## 2020-09-03 DIAGNOSIS — E66.9 DIABETES MELLITUS TYPE 2 IN OBESE (HCC): ICD-10-CM

## 2020-09-03 DIAGNOSIS — J45.51 SEVERE PERSISTENT ASTHMA WITH EXACERBATION: Primary | ICD-10-CM

## 2020-09-03 DIAGNOSIS — F43.21 GRIEF: ICD-10-CM

## 2020-09-03 PROCEDURE — 99213 OFFICE O/P EST LOW 20 MIN: CPT | Performed by: FAMILY MEDICINE

## 2020-09-03 RX ORDER — FAMOTIDINE 20 MG/1
TABLET, FILM COATED ORAL
COMMUNITY
Start: 2020-09-02 | End: 2020-12-07 | Stop reason: SDUPTHER

## 2020-09-03 RX ORDER — TIOTROPIUM BROMIDE INHALATION SPRAY 1.56 UG/1
SPRAY, METERED RESPIRATORY (INHALATION)
COMMUNITY
Start: 2020-09-02 | End: 2020-12-07 | Stop reason: ALTCHOICE

## 2020-09-03 RX ORDER — TRAZODONE HYDROCHLORIDE 50 MG/1
50 TABLET ORAL
Qty: 30 TAB | Refills: 0 | Status: SHIPPED | OUTPATIENT
Start: 2020-09-03 | End: 2020-10-28 | Stop reason: SDUPTHER

## 2020-09-03 NOTE — PROGRESS NOTES
Nohemy Correa is a 47 y.o. female who was seen by synchronous (real-time) audio-video technology on 9/3/2020 for Asthma (3 week follow-up)        Assessment & Plan:   Diagnoses and all orders for this visit:    1. Severe persistent asthma with exacerbation -previously mild intermittent but no worse flares recently in the last 2 months. Seeing Pulm. Discussed finishing Prednisone 5 mg daily and using Advair in am and Pulmicort in pm and adding on Spiriva. Planning for PFTs with home soon    2. Diabetes mellitus type 2 in obese (HCC) - sig hyperglycemia related to multiple steroid bursts. Prev well controlled until 3/3/2020 when starting to have issues with this. Sugars have normalized per patient. We will plan to repeat labs in another 2 to 3 months. 3. Grief- trial of trazodone to help with sleep and will see if insomnia symptoms improve after coming off prednisone. -     traZODone (DESYREL) 50 mg tablet; Take 1 Tab by mouth nightly. Follow-up and Dispositions    · Return in about 4 weeks (around 10/1/2020), or if symptoms worsen or fail to improve. Subjective:     Having a lot more trouble with asthma recently. No flares for > 10 yrs and has now had 3 ER visits with 1 hospitalization in the last 2 months. At last virtual appt, sent to ER d/t asthma flaring even after back-to-back nebulizer treatments. Able to safely be discharged home. Doing ok with Prednisone, Advair PRN, and starting Pulmicort Nebs. Seen by pulm-Dr. Stephania Rivas and started on Spiriva but pt has not picked up again yet. Planning to pull off Prednisone and do PFTs. Prev, admitted from 7/14/2020-7/15/2020 at [facility].  She had a sig asthma attack and ended up going to the ER and being put on steroids. She had severe hyperglycemia after this and returned to the hospital for admission. She was discharged on insulin.     Brief hospitalist notes that were sent to me report a diagnosis of severe persistent asthma with an acute exacerbation, lactic acidosis, type 2 diabetes mellitus with hyperglycemia, and acute kidney injury. Sent home on NPH 70 units daily and insulin R 10 units with meals but did not continue this as her sugars rapidly improved. She is still working on DM control. Last A1C was 8.1%. Sugars have been much better with readings in the 100-130 range. Lastly, she has been struggling with grief after the loss of her and her 's uncle. She has been having more trouble with sleep recently. She is actually going to a  today. Prior to Admission medications    Medication Sig Start Date End Date Taking? Authorizing Provider   traZODone (DESYREL) 50 mg tablet Take 1 Tab by mouth nightly. 9/3/20  Yes Bill Kam MD   montelukast (SINGULAIR) 10 mg tablet Take 1 Tab by mouth daily. 20  Yes Bill Kam MD   albuterol (PROVENTIL HFA, VENTOLIN HFA, PROAIR HFA) 90 mcg/actuation inhaler Take 2 Puffs by inhalation every four (4) hours as needed for Wheezing. 20  Yes Bill Kam MD   predniSONE (DELTASONE) 5 mg tablet Take 1 Tab by mouth daily. Start after Prednisone taper finishes 20  Yes Bill Kam MD   budesonide (PULMICORT) 1 mg/2 mL nbsp 2 mL by Nebulization route two (2) times a day for 90 days. 8/17/20 11/15/20 Yes Bill Kam MD   lisinopriL (PRINIVIL, ZESTRIL) 10 mg tablet  7/15/20  Yes Provider, Historical   dulaglutide (Trulicity) 8.41 IK/0.8 mL sub-q pen 0.5 mL by SubCUTAneous route every seven (7) days. 20  Yes Bill Kam MD   ascorbic acid, vitamin C, (Vitamin C) 500 mg tablet Take 500 mg by mouth daily. Yes Provider, Historical   fluticasone propion-salmeteroL (ADVAIR/WIXELA) 250-50 mcg/dose diskus inhaler Take 1 Puff by inhalation every twelve (12) hours. 6/15/20  Yes Bill Kam MD   cranberry extract 450 mg tab tablet Take 450 mg by mouth daily.    Yes Provider, Historical   Spiriva Respimat 1.25 mcg/actuation inhaler  9/2/20   Provider, Historical   famotidine (PEPCID) 20 mg tablet  9/2/20   Provider, Historical   predniSONE (DELTASONE) 10 mg tablet Take 3 pills for 2 days, then 2 pills for 2 days, then 1 pill for 3 days 8/17/20 9/3/20  Juan Figueroa MD   albuterol-ipratropium (DUO-NEB) 2.5 mg-0.5 mg/3 ml nebu 3 mL by Nebulization route every four (4) hours as needed for Wheezing or Shortness of Breath. 8/13/20   Juan Figueroa MD   HumaLOG U-100 Insulin 100 unit/mL injection 10 Units. 7/15/20   Provider, Historical   HumuLIN N NPH U-100 Insulin 100 unit/mL injection  7/17/20   Provider, Historical   TRUEplus Insulin 1 mL 31 gauge x 5/16 syrg  7/15/20   Provider, Historical   ProAir RespiClick 90 mcg/actuation breath activated inhaler Take 2 Puffs by inhalation every four (4) hours as needed. 7/13/20 9/3/20  Provider, Historical   hydroquinone (ESOTERICA, MELQUIN) 4 % topical cream Apply  to affected area two (2) times a day.  3/3/20   Juan Figueroa MD     Patient Active Problem List   Diagnosis Code    [facility] E66.01    Diabetes mellitus type 2 in obese -- Diet controlled E11.69, E66.9    TIA (transient ischemic attack) G45.9    Hypertension I10    Ex-smoker Z87.891    Blurred vision, left eye H53.8    Weakness of left side of body R53.1    Transient ischemic attack involving right internal carotid artery G45.1     Past Medical History:   Diagnosis Date    Asthma     Diabetes ([facility]     Diabetes mellitus type 2 in obese -- Diet controlled 10/28/2014    Morbid obesity ([facility] 10/28/2014       ROS  Gen - no fever/chills  Resp - per HPI  CV - per HPI  Rest per HPI    Objective:     Patient-Reported Vitals 9/3/2020   Patient-Reported Temperature 97.7        Physical exam:  General appearance - alert, well appearing, and in no distress  Eyes -sclera anicteric, no discharge  HEENT- normocephalic, atraumatic, moist mucous membranes, no visualized neck mass  Chest -normal respiratory effort, no visualized signs of respiratory distress  Neurological - alert, awake, normal speech, no focal findings or movement disorder noted  Psych - normal mood and affect  Skin- no apparent lesions    We discussed the expected course, resolution and complications of the diagnosis(es) in detail. Medication risks, benefits, costs, interactions, and alternatives were discussed as indicated. I advised her to contact the office if her condition worsens, changes or fails to improve as anticipated. She expressed understanding with the diagnosis(es) and plan. [facility], who was evaluated through a patient-initiated, synchronous (real-time) audio-video encounter, and/or her healthcare decision maker, is aware that it is a billable service, with coverage as determined by her insurance carrier. She provided verbal consent to proceed: Yes, and patient identification was verified. It was conducted pursuant to the emergency declaration under the [facility], [address] [place] [address] authority and the [employer] and [email or web address] General Act. A caregiver was present when appropriate. Ability to conduct physical exam was limited. I was at home. The patient was at home.       Guero Mike MD

## 2020-09-18 ENCOUNTER — VIRTUAL VISIT (OUTPATIENT)
Dept: FAMILY MEDICINE CLINIC | Age: 40
End: 2020-09-18
Payer: COMMERCIAL

## 2020-09-18 DIAGNOSIS — J45.51 SEVERE PERSISTENT ASTHMA WITH EXACERBATION: ICD-10-CM

## 2020-09-18 DIAGNOSIS — J45.51 SEVERE PERSISTENT ASTHMA WITH EXACERBATION: Primary | ICD-10-CM

## 2020-09-18 DIAGNOSIS — E66.9 DIABETES MELLITUS TYPE 2 IN OBESE (HCC): ICD-10-CM

## 2020-09-18 DIAGNOSIS — E11.69 DIABETES MELLITUS TYPE 2 IN OBESE (HCC): ICD-10-CM

## 2020-09-18 PROCEDURE — 99213 OFFICE O/P EST LOW 20 MIN: CPT | Performed by: FAMILY MEDICINE

## 2020-09-18 RX ORDER — PREDNISONE 50 MG/1
TABLET ORAL
COMMUNITY
Start: 2020-09-16 | End: 2020-09-24 | Stop reason: ALTCHOICE

## 2020-09-18 RX ORDER — PREDNISONE 10 MG/1
TABLET ORAL
COMMUNITY
Start: 2020-09-17 | End: 2020-09-24 | Stop reason: ALTCHOICE

## 2020-09-18 RX ORDER — BUDESONIDE 1 MG/2ML
1000 INHALANT ORAL
Qty: 90 EACH | Refills: 2 | Status: SHIPPED | OUTPATIENT
Start: 2020-09-18 | End: 2020-09-21 | Stop reason: SDUPTHER

## 2020-09-18 NOTE — PROGRESS NOTES
Willam Simons is a 47 y.o. female who was seen by synchronous (real-time) audio-video technology on 9/18/2020 for Asthma (2 week follow-up)        Assessment & Plan:   Diagnoses and all orders for this visit:    1. Severe persistent asthma with exacerbation -previously mild intermittent but numerous flares recently in the last 2 months. Seeing Pulm. Back on Prednisone and using Advair or Pulmicort nebs and Spiriva. Albuterol PRN. Planning for PFTs and allergy testing soon. 2. Diabetes mellitus type 2 in obese ([address] - sig hyperglycemia related to steroids. Increasing her mealtime insulin to 15 units 3 times daily and can give herself another 10 to 15 units when she checks her sugars the fourth time while on large prednisone doses. Discussed coming down on this dose as her prednisone dose goes lower and will reevaluate in 1 week. May do better on a long-acting insulin but will hold off on making additional changes today since this will likely be for the short-term while on prednisone. Prev well controlled until 3/3/2020 when starting to have issues with this. Sugars have normalized per patient. We will plan to repeat labs in another 2 to 3 months. Follow-up and Dispositions    · Return in about 1 week (around 9/25/2020). Subjective:     Having a lot more trouble with asthma recently. No flares for > 10 yrs and has now had 3 ER visits with 1 hospitalization in the last 2 months. At last virtual appt, sent to ER d/t asthma flaring even after back-to-back nebulizer treatments. Able to safely be discharged home. Doing ok with Prednisone, Advair PRN, and starting Pulmicort Nebs. Seen by pulm-Dr. Saad Mcnamara and started on Spiriva. Was doing well on low dose Prednisone at 5 mg daily. When she came off of this for allergy testing for her pulmonologist, her asthma flared. She was unable to do allergy testing and ended up in the emergency room. She was put on prednisone 50 mg daily for 3 days. She feels like her breathing is better but not doing very well yet. She has already talked with her pulmonologist and is planning to do a prednisone taper and stay on 10 mg daily and should be able to complete allergy testing while still on this. Currently she has 2 more doses of Prednisone 50 mg. Unfortunately, her Glu readings have been in 400-500s with one reading that was [facility] on her glucometer since being put on the 50 mg of prednisone. She has been checking sugars 4x per day and has started to give herself 10 to 15 units depending on her glucose readings 4 times per day. Prior to this, she is still working on DM control with last A1C was 8.1%. Sugars while on prednisone 5 mg daily were much better with readings in the 100-130 range. Prev, admitted from 7/14/2020-7/15/2020 at [facility].  She had a sig asthma attack and ended up going to the ER and being put on steroids. She had severe hyperglycemia after this and returned to the hospital for admission. She was discharged on insulin. Brief hospitalist notes that were sent to me report a diagnosis of severe persistent asthma with an acute exacerbation, lactic acidosis, type 2 diabetes mellitus with hyperglycemia, and acute kidney injury. Sent home on NPH 70 units daily and insulin R 10 units with meals but did not continue this as her sugars rapidly improved. Prior to Admission medications    Medication Sig Start Date End Date Taking? Authorizing Provider   predniSONE (DELTASONE) 50 mg tablet  9/16/20  Yes Provider, Historical   Spiriva Respimat 1.25 mcg/actuation inhaler  9/2/20  Yes Provider, Historical   famotidine (PEPCID) 20 mg tablet  9/2/20  Yes Provider, Historical   traZODone (DESYREL) 50 mg tablet Take 1 Tab by mouth nightly. 9/3/20  Yes Carmen Zurita MD   montelukast (SINGULAIR) 10 mg tablet Take 1 Tab by mouth daily.  8/31/20  Yes Carmen Zurita MD   albuterol (PROVENTIL HFA, VENTOLIN HFA, PROAIR HFA) 90 mcg/actuation inhaler Take 2 Puffs by inhalation every four (4) hours as needed for Wheezing. 8/20/20  Yes Shawn Siegel MD   budesonide (PULMICORT) 1 mg/2 mL nbsp 2 mL by Nebulization route two (2) times a day for 90 days. 8/17/20 11/15/20 Yes Shawn Siegel MD   lisinopriL (PRINIVIL, ZESTRIL) 10 mg tablet  7/15/20  Yes Provider, Historical   HumaLOG U-100 Insulin 100 unit/mL injection 10 Units. 7/15/20  Yes Provider, Historical   TRUEplus Insulin 1 mL 31 gauge x 5/16 syrg  7/15/20  Yes Provider, Historical   dulaglutide (Trulicity) 9.95 JY/4.6 mL sub-q pen 0.5 mL by SubCUTAneous route every seven (7) days. 6/26/20  Yes Shawn Siegel MD   ascorbic acid, vitamin C, (Vitamin C) 500 mg tablet Take 500 mg by mouth daily. Yes Provider, Historical   fluticasone propion-salmeteroL (ADVAIR/WIXELA) 250-50 mcg/dose diskus inhaler Take 1 Puff by inhalation every twelve (12) hours. 6/15/20  Yes Shawn Siegel MD   hydroquinone (ESOTERICA, MELQUIN) 4 % topical cream Apply  to affected area two (2) times a day. 3/3/20  Yes Shawn Siegel MD   cranberry extract 450 mg tab tablet Take 450 mg by mouth daily. Yes Provider, Historical   predniSONE (DELTASONE) 10 mg tablet  9/17/20   Provider, Historical   predniSONE (DELTASONE) 5 mg tablet Take 1 Tab by mouth daily. Start after Prednisone taper finishes 8/17/20 9/18/20  Shawn Siegel MD   albuterol-ipratropium (DUO-NEB) 2.5 mg-0.5 mg/3 ml nebu 3 mL by Nebulization route every four (4) hours as needed for Wheezing or Shortness of Breath.  8/13/20   Shawn Siegel MD   HumuLIN N NPH U-100 Insulin 100 unit/mL injection  7/17/20 9/18/20  Provider, Historical     Patient Active Problem List   Diagnosis Code    Morbid obesity ([facility] E66.01    Diabetes mellitus type 2 in obese -- Diet controlled E11.69, E66.9    TIA (transient ischemic attack) G45.9    Hypertension I10    Ex-smoker Z87.891    Blurred vision, left eye H53.8    Weakness of left side of body R53.1    Transient ischemic attack involving right internal carotid artery G45.1     Past Medical History:   Diagnosis Date    Asthma     [facility]     Diabetes mellitus type 2 in obese -- Diet controlled 10/28/2014    Morbid obesity ([facility] 10/28/2014       ROS  Gen - no fever/chills  Resp - per HPI  CV - per HPI  Rest per HPI    Objective:     Patient-Reported Vitals 9/3/2020   Patient-Reported Temperature 97.7        Physical exam:  General appearance - alert, well appearing, and in no distress  Eyes -sclera anicteric, no discharge  HEENT- normocephalic, atraumatic, moist mucous membranes, no visualized neck mass  Chest -normal respiratory effort, no visualized signs of respiratory distress  Neurological - alert, awake, normal speech, no focal findings or movement disorder noted  Psych - normal mood and affect  Skin- no apparent lesions    We discussed the expected course, resolution and complications of the diagnosis(es) in detail. Medication risks, benefits, costs, interactions, and alternatives were discussed as indicated. I advised her to contact the office if her condition worsens, changes or fails to improve as anticipated. She expressed understanding with the diagnosis(es) and plan. [facility], who was evaluated through a patient-initiated, synchronous (real-time) audio-video encounter, and/or her healthcare decision maker, is aware that it is a billable service, with coverage as determined by her insurance carrier. She provided verbal consent to proceed: Yes, and patient identification was verified. It was conducted pursuant to the emergency declaration under the [address] [place] [address] and the [employer] and [employer] General Act. A caregiver was present when appropriate. Ability to conduct physical exam was limited. I was at home. The patient was at home.       Mayte Rojas MD

## 2020-09-18 NOTE — PROGRESS NOTES
Chief Complaint   Patient presents with    Asthma     2 week follow-up   1. Have you been to the ER, urgent care clinic since your last visit? Hospitalized since your last visit? Yes Where: [facility] ER Asthma    2. Have you seen or consulted any other health care providers outside of the [address] [place] [address] since your last visit? Include any pap smears or colon screening. Yes Pulmonary  Placed on Prednisone 50  Mg x 3 days. Blood sugars ranging from 270 to [facility].  Taking 10-15 units of Regular Insulin

## 2020-09-21 DIAGNOSIS — J45.51 SEVERE PERSISTENT ASTHMA WITH EXACERBATION: ICD-10-CM

## 2020-09-21 RX ORDER — BUDESONIDE 1 MG/2ML
1000 INHALANT ORAL
Qty: 270 EACH | Refills: 0 | Status: SHIPPED | OUTPATIENT
Start: 2020-09-21 | End: 2020-09-21 | Stop reason: SDUPTHER

## 2020-09-21 RX ORDER — BUDESONIDE 1 MG/2ML
1000 INHALANT ORAL
Qty: 270 EACH | Refills: 1 | Status: SHIPPED | OUTPATIENT
Start: 2020-09-21 | End: 2021-05-12 | Stop reason: SDUPTHER

## 2020-09-24 ENCOUNTER — VIRTUAL VISIT (OUTPATIENT)
Dept: FAMILY MEDICINE CLINIC | Age: 40
End: 2020-09-24
Payer: COMMERCIAL

## 2020-09-24 DIAGNOSIS — J45.51 SEVERE PERSISTENT ASTHMA WITH EXACERBATION: Primary | ICD-10-CM

## 2020-09-24 DIAGNOSIS — E66.9 DIABETES MELLITUS TYPE 2 IN OBESE (HCC): ICD-10-CM

## 2020-09-24 DIAGNOSIS — E11.69 DIABETES MELLITUS TYPE 2 IN OBESE (HCC): ICD-10-CM

## 2020-09-24 PROCEDURE — 99213 OFFICE O/P EST LOW 20 MIN: CPT | Performed by: FAMILY MEDICINE

## 2020-09-24 RX ORDER — AZITHROMYCIN 250 MG/1
TABLET, FILM COATED ORAL
Qty: 6 TAB | Refills: 0 | Status: SHIPPED | OUTPATIENT
Start: 2020-09-24 | End: 2020-09-28 | Stop reason: ALTCHOICE

## 2020-09-24 RX ORDER — PREDNISONE 20 MG/1
TABLET ORAL
Qty: 13 TAB | Refills: 0 | Status: SHIPPED | OUTPATIENT
Start: 2020-09-24 | End: 2020-12-07 | Stop reason: SDUPTHER

## 2020-09-24 NOTE — PROGRESS NOTES
Chief Complaint   Patient presents with    Diabetes     1 week follow-up   1. Have you been to the ER, urgent care clinic since your last visit? Hospitalized since your last visit? Yes Where: [facility] ER Acute asthma attack    2. Have you seen or consulted any other health care providers outside of the [address] [place] [address] since your last visit? Include any pap smears or colon screening.  No   Feeling tired today and has no voice

## 2020-09-24 NOTE — PROGRESS NOTES
Carlyn Stahl is a 47 y.o. female who was seen by synchronous (real-time) audio-video technology on 9/24/2020 for Diabetes (1 week follow-up)        Assessment & Plan:   Diagnoses and all orders for this visit:    1. Severe persistent asthma with exacerbation - Flaring and dyspneic today. Will use new Pred taper and start on Z elizabeth given recent fever. Will have her call Pulm to help manage her through this time. Stopping Advair and rec Pulmicort 1mg nebs BID, ct with spiriva, and albuterol PRN given overlap of meds. -     predniSONE (DELTASONE) 20 mg tablet; Take 3 pills for 2 days, then 2 pills for 2 days, then 1 pill for 3 days  -     azithromycin (ZITHROMAX) 250 mg tablet; Take 2 tablets today, then take 1 tablet daily    2. Diabetes mellitus type 2 in obese (HCC)  - sugars running too high on Prednisone still. Will add on 70/30 x 15 units BID and can use fast acting insulin 2x/day with sliding scale. Can ct Trulicity as well. Sent rx to [facility] for use with goodrx since likely much more costly with her insurance at [employer]. -     insulin nph-regular human rec (HUMULIN 70-30) 100 unit/mL (70-30) inpn; 15 Units by SubCUTAneous route Before breakfast and dinner. Follow-up and Dispositions    · Return in about 4 days (around 9/28/2020), or if symptoms worsen or fail to improve. Subjective:     Asthma - No flares for > 10 yrs and recently with >5 ER visits with 1 hospitalization in the last 2 months. Seeing pulm-Dr. Kianna Hung and started on Spiriva. Did ok on Prednisone 5 mg daily after long taper of Prednisone but when she came off of this for allergy testing for her pulmonologist, her asthma flared and went to the ER twice in the past few weeks. Currently on Prednisone 10 mg and now with fever and mild dyspnea. Ct to struggle with flares.     Currently taking:  Advair 250/50 daily in am  Spiriva daily in am  Pulmicort 1 mg neb TID  Prednisone    Unfortunately, her Glu readings have been in 400-500s with one reading that was [facility] on her glucometer since being put on the 50 mg of prednisone. She has been checking sugars 4x per day and has started to give herself 10 to 15 units depending on her glucose readings 4 times per day. Prior to this, she is still working on DM control with last A1C was 8.1%. Sugars while on prednisone 5 mg daily were much better with readings in the 100-130 range. Prior to Admission medications    Medication Sig Start Date End Date Taking? Authorizing Provider   predniSONE (DELTASONE) 20 mg tablet Take 3 pills for 2 days, then 2 pills for 2 days, then 1 pill for 3 days 9/24/20  Yes Dieudonne Bullock MD   azithromycin [facility]) 250 mg tablet Take 2 tablets today, then take 1 tablet daily 9/24/20 9/29/20 Yes Dieudonne Bullock MD   insulin nph-regular human rec (HUMULIN 70-30) 100 unit/mL (70-30) inpn 15 Units by SubCUTAneous route Before breakfast and dinner. 9/24/20  Yes Dieudonne Bullock MD   budesonide (PULMICORT) 1 mg/2 mL nbsp 2 mL by Nebulization route three (3) times daily as needed for Cough. 9/21/20  Yes Dieudonne Bullock MD   Spiriva Respimat 1.25 mcg/actuation inhaler  9/2/20  Yes Provider, Historical   famotidine (PEPCID) 20 mg tablet  9/2/20  Yes Provider, Historical   traZODone (DESYREL) 50 mg tablet Take 1 Tab by mouth nightly. 9/3/20  Yes Dieudonne Bullock MD   montelukast (SINGULAIR) 10 mg tablet Take 1 Tab by mouth daily. 8/31/20  Yes Dieudonne Bullock MD   albuterol (PROVENTIL HFA, VENTOLIN HFA, PROAIR HFA) 90 mcg/actuation inhaler Take 2 Puffs by inhalation every four (4) hours as needed for Wheezing. 8/20/20  Yes Dieudonne Bullock MD   lisinopriL (PRINIVIL, ZESTRIL) 10 mg tablet  7/15/20  Yes Provider, Historical   HumaLOG U-100 Insulin 100 unit/mL injection 10 Units.  7/15/20  Yes Provider, Historical   TRUEplus Insulin 1 mL 31 gauge x 5/16 syrg  7/15/20  Yes Provider, Historical   dulaglutide (Trulicity) 8.90 HM/9.2 mL sub-q pen 0.5 mL by SubCUTAneous route every seven (7) days. 6/26/20  Yes Aysha Munoz MD   ascorbic acid, vitamin C, (Vitamin C) 500 mg tablet Take 500 mg by mouth daily. Yes Provider, Historical   fluticasone propion-salmeteroL (ADVAIR/WIXELA) 250-50 mcg/dose diskus inhaler Take 1 Puff by inhalation every twelve (12) hours. 6/15/20  Yes Aysha Munoz MD   hydroquinone (ESOTERICA, MELQUIN) 4 % topical cream Apply  to affected area two (2) times a day. 3/3/20  Yes Aysha Munoz MD   cranberry extract 450 mg tab tablet Take 450 mg by mouth daily. Yes Provider, Historical   predniSONE (DELTASONE) 10 mg tablet  9/17/20 9/24/20  Provider, Historical   predniSONE (DELTASONE) 50 mg tablet  9/16/20 9/24/20  Provider, Historical   albuterol-ipratropium (DUO-NEB) 2.5 mg-0.5 mg/3 ml nebu 3 mL by Nebulization route every four (4) hours as needed for Wheezing or Shortness of Breath.  8/13/20   Aysha Munoz MD     Patient Active Problem List   Diagnosis Code    [facility] E66.01    Diabetes mellitus type 2 in obese -- Diet controlled E11.69, E66.9    TIA (transient ischemic attack) G45.9    Hypertension I10    Ex-smoker Z87.891    Blurred vision, left eye H53.8    Weakness of left side of body R53.1    Transient ischemic attack involving right internal carotid artery G45.1     Past Medical History:   Diagnosis Date    Asthma     [facility]     Diabetes mellitus type 2 in obese -- Diet controlled 10/28/2014    Morbid obesity ([facility] 10/28/2014       ROS  Gen - no fever/chills  Resp - no dyspnea or cough  CV - no chest pain or DURON  Rest per HPI    Objective:     Patient-Reported Vitals 9/24/2020   Patient-Reported Temperature 100        Physical exam:  General appearance - alert, well appearing, and in no distress  Eyes -sclera anicteric, no discharge  HEENT- normocephalic, atraumatic, moist mucous membranes, no visualized neck mass  Chest -normal respiratory effort, no visualized signs of respiratory distress  Neurological - alert, awake, normal speech, no focal findings or movement disorder noted  Psych - normal mood and affect  Skin- no apparent lesions    We discussed the expected course, resolution and complications of the diagnosis(es) in detail. Medication risks, benefits, costs, interactions, and alternatives were discussed as indicated. I advised her to contact the office if her condition worsens, changes or fails to improve as anticipated. She expressed understanding with the diagnosis(es) and plan. [facility], who was evaluated through a patient-initiated, synchronous (real-time) audio-video encounter, and/or her healthcare decision maker, is aware that it is a billable service, with coverage as determined by her insurance carrier. She provided verbal consent to proceed: Yes, and patient identification was verified. It was conducted pursuant to the emergency declaration under the [facility], [address] [place] [address] authority and the [employer] and [employer] General Act. A caregiver was present when appropriate. Ability to conduct physical exam was limited. I was in the office. The patient was at home.       Anusha Curry MD

## 2020-09-24 NOTE — LETTER
NOTIFICATION RETURN TO WORK / SCHOOL 
 
9/24/2020 4:35 PM 
 
Ms. Charbel Jose Alberto [address] [facility] [id number] [facility] [id number] To Whom It May Concern: 
 
Charbel [address] is currently under the care of Alex Stephen. She will return to work/school on: 10/8/2020 If there are questions or concerns please have the patient contact our office.  
 
 
 
Sincerely, 
 
 
Jax Rodriguez MD

## 2020-09-25 ENCOUNTER — DOCUMENTATION ONLY (OUTPATIENT)
Dept: FAMILY MEDICINE CLINIC | Age: 40
End: 2020-09-25

## 2020-09-25 NOTE — PROGRESS NOTES
Spoke with patient and scheduled for 4 day follow-up and advised to keep log of blood sugars to review. Per Dr Michael Booth stop Advair and use Pulmicort 2x a day . Appointment scheduled for Dr Michael Booth for Monday.

## 2020-09-28 ENCOUNTER — VIRTUAL VISIT (OUTPATIENT)
Dept: FAMILY MEDICINE CLINIC | Age: 40
End: 2020-09-28
Payer: COMMERCIAL

## 2020-09-28 DIAGNOSIS — E66.9 DIABETES MELLITUS TYPE 2 IN OBESE (HCC): ICD-10-CM

## 2020-09-28 DIAGNOSIS — E11.69 DIABETES MELLITUS TYPE 2 IN OBESE (HCC): ICD-10-CM

## 2020-09-28 DIAGNOSIS — J45.51 SEVERE PERSISTENT ASTHMA WITH EXACERBATION: Primary | ICD-10-CM

## 2020-09-28 PROCEDURE — 99213 OFFICE O/P EST LOW 20 MIN: CPT | Performed by: FAMILY MEDICINE

## 2020-09-28 NOTE — PROGRESS NOTES
Chief Complaint   Patient presents with    Diabetes     4 day Follow-up   1. Have you been to the ER, urgent care clinic since your last visit? Hospitalized since your last visit? No    2. Have you seen or consulted any other health care providers outside of the [address] [place] [address] since your last visit? Include any pap smears or colon screening.  No     Finished last antibiotic this morning but still has larynigitis

## 2020-09-28 NOTE — PROGRESS NOTES
Keren Downs is a 47 y.o. female who was seen by synchronous (real-time) audio-video technology on 9/28/2020 for Diabetes (4 Day Follow-up)        Assessment & Plan:   Diagnoses and all orders for this visit:    1. Severe persistent asthma with exacerbation - Flare improved, still with laryngitis sx of voice loss. Likely viral and expect improvement in next 2 weeks. Encouraged her to call Pulm to help manage asthma further. Wonder if Xolair will be a good fit for her. 2. Diabetes mellitus type 2 in obese (HCC)  - sugars improving with 70/30 x 10 units BID and fast acting insulin 2x/day with sliding scale PRN. Ct Trulicity as well. Reviewed glu logs and no changes today. Discussed cutting down on insulin dose after Prednisone dose comes down as sugars should also improve. Follow-up and Dispositions    · Return in about 2 weeks (around 10/12/2020). Subjective:     Asthma - No flares for > 10 yrs and recently with >5 ER visits with 1 hospitalization in the last 2 months. Seeing pulm-Dr. Samantha Morel. Did ok on Prednisone 5 mg daily after long taper of Prednisone but when she came off of this for allergy testing for her pulmonologist, her asthma flared and went to the ER twice in the past few weeks. Currently on another Prednisone taper and plans to continue 10 mg daily after finishing this. No further fever, coughing spasms, or mild dyspnea but still dealing with voice loss. At last appt, was taking:  Advair 250/50 daily in am  Spiriva daily in am  Pulmicort 1 mg neb TID  Prednisone 10 mg    So I stopped Advair and had her switch to Pulmicort BID with Spiriva daily while on Prednisone taper. Encouraged her to see/discuss with Dr. Samantha Morel and have her manage asthma meds to get the best results. DM - Sugars were in 400-500 during initial flaring of asthma d/t numerous Prednisone rxs.  She has been checking sugars 4x per day and has started to give herself 10 to 15 units depending on her glucose readings 4 times per day. At last appt, switched her to 70/30 insulin x 10 units BID and SSI for other 2 glu checks and ct on Trulicity. She is doing well with all sugars in 100-200 range currently. Prior to this, she is still working on DM control with last A1C was 8.1%. Sugars while on prednisone 5 mg daily were much better with readings in the 100-130 range. Prior to Admission medications    Medication Sig Start Date End Date Taking? Authorizing Provider   predniSONE (DELTASONE) 20 mg tablet Take 3 pills for 2 days, then 2 pills for 2 days, then 1 pill for 3 days 9/24/20  Yes Dieudonne Bullock MD   insulin nph-regular human rec (HUMULIN 70-30) 100 unit/mL (70-30) inpn 15 Units by SubCUTAneous route Before breakfast and dinner. Patient taking differently: 10 Units by SubCUTAneous route Before breakfast and dinner. 9/24/20  Yes Dieudonne Bullock MD   budesonide (PULMICORT) 1 mg/2 mL nbsp 2 mL by Nebulization route three (3) times daily as needed for Cough. 9/21/20  Yes Dieudonne Bullock MD   Spiriva Respimat 1.25 mcg/actuation inhaler  9/2/20  Yes Provider, Historical   famotidine (PEPCID) 20 mg tablet  9/2/20  Yes Provider, Historical   montelukast (SINGULAIR) 10 mg tablet Take 1 Tab by mouth daily. 8/31/20  Yes Dieudonne Bullock MD   albuterol (PROVENTIL HFA, VENTOLIN HFA, PROAIR HFA) 90 mcg/actuation inhaler Take 2 Puffs by inhalation every four (4) hours as needed for Wheezing. 8/20/20  Yes Dieudonne Bullock MD   lisinopriL (PRINIVIL, ZESTRIL) 10 mg tablet  7/15/20  Yes Provider, Historical   HumaLOG U-100 Insulin 100 unit/mL injection 5 Units. 7/15/20  Yes Provider, Historical   dulaglutide (Trulicity) 1.59 OI/3.5 mL sub-q pen 0.5 mL by SubCUTAneous route every seven (7) days. 6/26/20  Yes Dieudonne Bullock MD   ascorbic acid, vitamin C, (Vitamin C) 500 mg tablet Take 500 mg by mouth daily.    Yes Provider, Historical   hydroquinone (ESOTERICA, MELQUIN) 4 % topical cream Apply  to affected area two (2) times a day. 3/3/20  Yes Ros Valdez MD   cranberry extract 450 mg tab tablet Take 450 mg by mouth daily. Yes Provider, Historical   azithromycin (ZITHROMAX) 250 mg tablet Take 2 tablets today, then take 1 tablet daily 9/24/20 9/28/20  Ros Valdez MD   traZODone (DESYREL) 50 mg tablet Take 1 Tab by mouth nightly. 9/3/20   Ros Valdez MD   albuterol-ipratropium (DUO-NEB) 2.5 mg-0.5 mg/3 ml nebu 3 mL by Nebulization route every four (4) hours as needed for Wheezing or Shortness of Breath. 8/13/20   Ros Valdez MD   TRUEplus Insulin 1 mL 31 gauge x 5/16 syrg  7/15/20   Provider, Historical   fluticasone propion-salmeteroL (ADVAIR/WIXELA) 250-50 mcg/dose diskus inhaler Take 1 Puff by inhalation every twelve (12) hours.  6/15/20   Ros Valdez MD     Patient Active Problem List   Diagnosis Code    [facility] ([facility] E66.01    Diabetes mellitus type 2 in obese -- Diet controlled E11.69, E66.9    TIA (transient ischemic attack) G45.9    Hypertension I10    Ex-smoker Z87.891    Blurred vision, left eye H53.8    Weakness of left side of body R53.1    Transient ischemic attack involving right internal carotid artery G45.1     Past Medical History:   Diagnosis Date    Asthma     Diabetes ([facility]     Diabetes mellitus type 2 in obese -- Diet controlled 10/28/2014    Morbid obesity ([facility] 10/28/2014     ROS  Gen - no fever/chills  Resp - no dyspnea or cough  CV - no chest pain or DURON  Rest per HPI    Objective:     Patient-Reported Vitals 9/24/2020   Patient-Reported Temperature 100        Physical exam:  General appearance - alert, well appearing, and in no distress  Eyes -sclera anicteric, no discharge  HEENT- normocephalic, atraumatic, moist mucous membranes, no visualized neck mass, voice loss noted  Chest -normal respiratory effort, no visualized signs of respiratory distress  Neurological - alert, awake, normal speech, no focal findings or movement disorder noted  Psych - normal mood and affect  Skin- no apparent lesions    We discussed the expected course, resolution and complications of the diagnosis(es) in detail. Medication risks, benefits, costs, interactions, and alternatives were discussed as indicated. I advised her to contact the office if her condition worsens, changes or fails to improve as anticipated. She expressed understanding with the diagnosis(es) and plan. [facility], who was evaluated through a patient-initiated, synchronous (real-time) audio-video encounter, and/or her healthcare decision maker, is aware that it is a billable service, with coverage as determined by her insurance carrier. She provided verbal consent to proceed: Yes, and patient identification was verified. It was conducted pursuant to the emergency declaration under the [address] [place] [address], [address] [place] [address] authority and the [employer] Act. A caregiver was present when appropriate. Ability to conduct physical exam was limited. I was in the office. The patient was at home.       Brendan Mccauley MD

## 2020-10-22 ENCOUNTER — TELEPHONE (OUTPATIENT)
Dept: FAMILY MEDICINE CLINIC | Age: 40
End: 2020-10-22

## 2020-10-22 DIAGNOSIS — E11.69 DIABETES MELLITUS TYPE 2 IN OBESE (HCC): ICD-10-CM

## 2020-10-22 DIAGNOSIS — E66.9 DIABETES MELLITUS TYPE 2 IN OBESE (HCC): ICD-10-CM

## 2020-10-22 NOTE — TELEPHONE ENCOUNTER
Request for humulin twice a day. Last office visit 9/28/20, next office visit 10/28/20.  Refill pending for provider approval.

## 2020-10-22 NOTE — TELEPHONE ENCOUNTER
Patient called, stating she needs a refill for   Humalog 100 units    [facility] [address]    Pt is all out    Pt's phone  [phone]

## 2020-10-27 DIAGNOSIS — J45.51 SEVERE PERSISTENT ASTHMA WITH EXACERBATION: ICD-10-CM

## 2020-10-27 DIAGNOSIS — E11.69 DIABETES MELLITUS TYPE 2 IN OBESE (HCC): Primary | ICD-10-CM

## 2020-10-27 DIAGNOSIS — E66.9 DIABETES MELLITUS TYPE 2 IN OBESE (HCC): Primary | ICD-10-CM

## 2020-10-27 RX ORDER — AZITHROMYCIN 250 MG/1
TABLET, FILM COATED ORAL
Qty: 6 TAB | Refills: 0 | Status: SHIPPED | OUTPATIENT
Start: 2020-10-27 | End: 2020-11-01

## 2020-10-27 RX ORDER — INSULIN LISPRO 100 [IU]/ML
INJECTION, SOLUTION INTRAVENOUS; SUBCUTANEOUS
Qty: 1 VIAL | Refills: 1
Start: 2020-10-27 | End: 2020-12-17 | Stop reason: SDUPTHER

## 2020-10-27 NOTE — TELEPHONE ENCOUNTER
Patient would like refill of antibiotic for laryngitis. Has appointment scheduled for tomorrow for Hospital follow-up.

## 2020-10-28 ENCOUNTER — VIRTUAL VISIT (OUTPATIENT)
Dept: FAMILY MEDICINE CLINIC | Age: 40
End: 2020-10-28
Payer: COMMERCIAL

## 2020-10-28 DIAGNOSIS — E66.9 DIABETES MELLITUS TYPE 2 IN OBESE (HCC): ICD-10-CM

## 2020-10-28 DIAGNOSIS — F43.21 GRIEF: ICD-10-CM

## 2020-10-28 DIAGNOSIS — E11.69 DIABETES MELLITUS TYPE 2 IN OBESE (HCC): ICD-10-CM

## 2020-10-28 DIAGNOSIS — R25.2 LEG CRAMPS: ICD-10-CM

## 2020-10-28 DIAGNOSIS — Z09 HOSPITAL DISCHARGE FOLLOW-UP: Primary | ICD-10-CM

## 2020-10-28 DIAGNOSIS — J45.51 SEVERE PERSISTENT ASTHMA WITH EXACERBATION: ICD-10-CM

## 2020-10-28 DIAGNOSIS — E66.01 MORBID OBESITY (HCC): ICD-10-CM

## 2020-10-28 PROCEDURE — 99214 OFFICE O/P EST MOD 30 MIN: CPT | Performed by: FAMILY MEDICINE

## 2020-10-28 RX ORDER — LISINOPRIL 20 MG/1
20 TABLET ORAL
COMMUNITY
Start: 2020-10-19 | End: 2020-12-07 | Stop reason: SDUPTHER

## 2020-10-28 RX ORDER — AMLODIPINE BESYLATE 5 MG/1
5 TABLET ORAL DAILY
COMMUNITY
Start: 2020-10-19 | End: 2020-12-07 | Stop reason: SDUPTHER

## 2020-10-28 RX ORDER — LANOLIN ALCOHOL/MO/W.PET/CERES
400 CREAM (GRAM) TOPICAL DAILY
Qty: 30 TAB | Refills: 1 | Status: SHIPPED | OUTPATIENT
Start: 2020-10-28 | End: 2020-12-17 | Stop reason: SDUPTHER

## 2020-10-28 RX ORDER — TRAZODONE HYDROCHLORIDE 50 MG/1
50 TABLET ORAL
Qty: 30 TAB | Refills: 0 | Status: SHIPPED | OUTPATIENT
Start: 2020-10-28 | End: 2020-12-07 | Stop reason: SDUPTHER

## 2020-10-28 RX ORDER — ALBUTEROL SULFATE 0.83 MG/ML
SOLUTION RESPIRATORY (INHALATION)
COMMUNITY
Start: 2020-10-21

## 2020-10-28 NOTE — PROGRESS NOTES
Chief Complaint   Patient presents with   [facility] Follow Up     Discharged from [address] [place] [address] 10/19/20   1. Have you been to the ER, urgent care clinic since your last visit? Hospitalized since your last visit? Yes Where: [facility]    2. Have you seen or consulted any other health care providers outside of the [address] [place] [address] since your last visit? Include any pap smears or colon screening.  No   Discuss pulmonary and blood sugars  BS @ noon 189

## 2020-10-28 NOTE — PROGRESS NOTES
Justice Sidhu is a 47 y.o. female who was seen by synchronous (real-time) audio-video technology on 10/28/2020 for Hospital Follow Up (Discharged from [address] [place] [address] 10/19/20)        Assessment & Plan:   Diagnoses and all orders for this visit:    1. Hospital discharge follow-up  2. Severe persistent asthma with exacerbation  -seemed to recover fairly well. Needs to ct working with Pulm    3. Diabetes mellitus type 2 in obese (HCC) - adjusting meds based on steroids    4. Morbid obesity ([address] - to work on this after asthma improves    5. Leg cramps - trial of mag ox  -     magnesium oxide (MAG-OX) 400 mg tablet; Take 1 Tab by mouth daily. 6. Grief  -     traZODone (DESYREL) 50 mg tablet; Take 1 Tab by mouth nightly. Follow-up and Dispositions    · Return in about 2 weeks (around 11/11/2020), or if symptoms worsen or fail to improve. Subjective:     Hospital admission:    Admitted from 10/16/2020-10/19/2020 at [facility].   She had a sig asthma attack and ended up admitted with IV steroids. Voice was improving prior to d/c but having trouble with this again. Working with Pulm but still having trouble staying out of hospital.    Diabetes Mellitus:  Sugars are improving since finishing prednisone taper. Taking 50 units of 70/30 BID from hospital but getting low sugars so dropped to 35 units x 70/30 bid    Also taking Humalog 5 units TID with meals. Reports no polyuria or polydipsia, no chest pain, dyspnea or TIA's, no numbness, tingling or pain in extremities  Not working on exercise  Working on diet a lot recently  Pt is a non smoker.     Lab Results   Component Value Date/Time    Hemoglobin A1c (POC) 6.5 03/30/2016 10:46 AM    Hemoglobin A1c (POC) 6.4 10/28/2015 12:06 PM    Hemoglobin A1c 8.1 (H) 03/03/2020 09:52 AM    Microalb/Creat ratio (ug/mg creat.) <2.3 04/03/2019 12:30 PM    LDL, calculated 37 08/08/2019 09:21 AM    Creatinine 0.76 03/03/2020 09:52 AM      Lab Results   Component Value Date/Time    GFR est  03/03/2020 09:52 AM    GFR est non- 03/03/2020 09:52 AM      Lab Results   Component Value Date/Time    TSH 2.660 08/08/2019 09:21 AM         Prior to Admission medications    Medication Sig Start Date End Date Taking? Authorizing Provider   lisinopriL (PRINIVIL, ZESTRIL) 20 mg tablet 20 mg. 10/19/20  Yes Provider, Historical   amLODIPine (NORVASC) 5 mg tablet Take 5 mg by mouth daily. 10/19/20  Yes Provider, Historical   albuterol (PROVENTIL VENTOLIN) 2.5 mg /3 mL (0.083 %) nebu  10/21/20  Yes Provider, Historical   traZODone (DESYREL) 50 mg tablet Take 1 Tab by mouth nightly. 10/28/20  Yes Shawn Allison MD   magnesium oxide (MAG-OX) 400 mg tablet Take 1 Tab by mouth daily. 10/28/20  Yes Shawn Allison MD   HumaLOG U-100 Insulin 100 unit/mL injection 5 Units. 10/27/20  Yes Shawn Allison MD   [facility]) 250 mg tablet Take 2 tablets today, then take 1 tablet daily 10/27/20 11/1/20 Yes Shawn Allison MD   insulin nph-regular human rec (HUMULIN 70-30) 100 unit/mL (70-30) inpn 15 Units by SubCUTAneous route Before breakfast and dinner. 10/23/20  Yes Shawn Allison MD   predniSONE (DELTASONE) 20 mg tablet Take 3 pills for 2 days, then 2 pills for 2 days, then 1 pill for 3 days 9/24/20  Yes Shawn Allison MD   budesonide (PULMICORT) 1 mg/2 mL nbsp 2 mL by Nebulization route three (3) times daily as needed for Cough. 9/21/20  Yes Shawn Allison MD   Spiriva Respimat 1.25 mcg/actuation inhaler  9/2/20  Yes Provider, Historical   famotidine (PEPCID) 20 mg tablet  9/2/20  Yes Provider, Historical   montelukast (SINGULAIR) 10 mg tablet Take 1 Tab by mouth daily.  8/31/20  Yes Shawn Allison MD   albuterol (PROVENTIL HFA, VENTOLIN HFA, PROAIR HFA) 90 mcg/actuation inhaler Take 2 Puffs by inhalation every four (4) hours as needed for Wheezing. 8/20/20  Yes Shawn Allison MD   TRUEplus Insulin 1 mL 31 gauge x 5/16 syrg  7/15/20  Yes Provider, Historical   dulaglutide (Trulicity) 2.08 UE/2.0 mL sub-q pen 0.5 mL by SubCUTAneous route every seven (7) days. 6/26/20  Yes Rebeca Bender MD   ascorbic acid, vitamin C, (Vitamin C) 500 mg tablet Take 500 mg by mouth daily. Yes Provider, Historical   fluticasone propion-salmeteroL (ADVAIR/WIXELA) 250-50 mcg/dose diskus inhaler Take 1 Puff by inhalation every twelve (12) hours. 6/15/20  Yes Rebeca Bender MD   hydroquinone (ESOTERICA, MELQUIN) 4 % topical cream Apply  to affected area two (2) times a day. 3/3/20  Yes Rebeca Bender MD   cranberry extract 450 mg tab tablet Take 450 mg by mouth daily. Yes Provider, Historical   traZODone (DESYREL) 50 mg tablet Take 1 Tab by mouth nightly. 9/3/20 10/28/20  Rebeca Bender MD   albuterol-ipratropium (DUO-NEB) 2.5 mg-0.5 mg/3 ml nebu 3 mL by Nebulization route every four (4) hours as needed for Wheezing or Shortness of Breath.  8/13/20 10/28/20  Rebeca Bender MD   lisinopriL (PRINIVIL, ZESTRIL) 10 mg tablet  7/15/20 10/28/20  Provider, Historical     Patient Active Problem List   Diagnosis Code    [facility] ([facility] E66.01    Diabetes mellitus type 2 in obese -- Diet controlled E11.69, E66.9    TIA (transient ischemic attack) G45.9    Hypertension I10    Ex-smoker Z87.891    Blurred vision, left eye H53.8    Weakness of left side of body R53.1    Transient ischemic attack involving right internal carotid artery G45.1     Past Medical History:   Diagnosis Date    Asthma     Diabetes ([facility]     Diabetes mellitus type 2 in obese -- Diet controlled 10/28/2014    Morbid obesity ([facility] 10/28/2014     ROS  Gen - no fever/chills  Resp - no dyspnea or cough  CV - no chest pain or DURON  Rest per HPI    Objective:     Patient-Reported Vitals 10/28/2020   Patient-Reported Pulse 101   Patient-Reported Temperature -   Patient-Reported Systolic  088   Patient-Reported Diastolic 98        Physical exam:  General appearance - alert, well appearing, and in no distress  Eyes -sclera anicteric, no discharge  HEENT- normocephalic, atraumatic, moist mucous membranes, no visualized neck mass  Chest -normal respiratory effort, no visualized signs of respiratory distress  Neurological - alert, awake, normal speech, no focal findings or movement disorder noted  Psych - normal mood and affect  Skin- no apparent lesions    We discussed the expected course, resolution and complications of the diagnosis(es) in detail. Medication risks, benefits, costs, interactions, and alternatives were discussed as indicated. I advised her to contact the office if her condition worsens, changes or fails to improve as anticipated. She expressed understanding with the diagnosis(es) and plan. [facility], who was evaluated through a patient-initiated, synchronous (real-time) audio-video encounter, and/or her healthcare decision maker, is aware that it is a billable service, with coverage as determined by her insurance carrier. She provided verbal consent to proceed: Yes, and patient identification was verified. It was conducted pursuant to the emergency declaration under the [address] [place] [address], [address] [place] [address] authority and the [employer] General Act. A caregiver was present when appropriate. Ability to conduct physical exam was limited. I was at home. The patient was at home.       Belkys [place] MD

## 2020-12-07 ENCOUNTER — VIRTUAL VISIT (OUTPATIENT)
Dept: FAMILY MEDICINE CLINIC | Age: 40
End: 2020-12-07

## 2020-12-07 DIAGNOSIS — E66.9 DIABETES MELLITUS TYPE 2 IN OBESE (HCC): ICD-10-CM

## 2020-12-07 DIAGNOSIS — F43.21 GRIEF: ICD-10-CM

## 2020-12-07 DIAGNOSIS — E11.69 DIABETES MELLITUS TYPE 2 IN OBESE (HCC): ICD-10-CM

## 2020-12-07 DIAGNOSIS — Z09 HOSPITAL DISCHARGE FOLLOW-UP: Primary | ICD-10-CM

## 2020-12-07 DIAGNOSIS — I10 ESSENTIAL HYPERTENSION: ICD-10-CM

## 2020-12-07 DIAGNOSIS — J45.51 SEVERE PERSISTENT ASTHMA WITH EXACERBATION: ICD-10-CM

## 2020-12-07 RX ORDER — DULAGLUTIDE 1.5 MG/.5ML
1.5 INJECTION, SOLUTION SUBCUTANEOUS
Qty: 4 SYRINGE | Refills: 2 | Status: SHIPPED | OUTPATIENT
Start: 2020-12-07 | End: 2021-03-08 | Stop reason: DRUGHIGH

## 2020-12-07 RX ORDER — TRAZODONE HYDROCHLORIDE 50 MG/1
50 TABLET ORAL
Qty: 90 TAB | Refills: 0 | Status: SHIPPED | OUTPATIENT
Start: 2020-12-07 | End: 2021-03-08 | Stop reason: SDUPTHER

## 2020-12-07 RX ORDER — ASPIRIN 81 MG/1
81 TABLET ORAL DAILY
COMMUNITY

## 2020-12-07 RX ORDER — PANTOPRAZOLE SODIUM 40 MG/1
40 TABLET, DELAYED RELEASE ORAL DAILY
COMMUNITY
Start: 2020-11-17 | End: 2021-01-14 | Stop reason: SDUPTHER

## 2020-12-07 RX ORDER — FLUTICASONE PROPIONATE AND SALMETEROL 500; 50 UG/1; UG/1
1 POWDER RESPIRATORY (INHALATION) 2 TIMES DAILY
COMMUNITY
End: 2021-05-12 | Stop reason: SDUPTHER

## 2020-12-07 RX ORDER — AMLODIPINE BESYLATE 5 MG/1
5 TABLET ORAL DAILY
Qty: 90 TAB | Refills: 0 | Status: SHIPPED | OUTPATIENT
Start: 2020-12-07 | End: 2021-03-08 | Stop reason: SDUPTHER

## 2020-12-07 RX ORDER — LORATADINE 10 MG/1
10 TABLET ORAL DAILY
COMMUNITY
Start: 2020-11-17 | End: 2021-03-08 | Stop reason: ALTCHOICE

## 2020-12-07 RX ORDER — PREDNISONE 10 MG/1
5 TABLET ORAL DAILY
COMMUNITY
Start: 2020-10-30 | End: 2021-04-19 | Stop reason: ALTCHOICE

## 2020-12-07 RX ORDER — LOSARTAN POTASSIUM 50 MG/1
50 TABLET ORAL DAILY
Qty: 90 TAB | Refills: 0 | Status: SHIPPED | OUTPATIENT
Start: 2020-12-07 | End: 2021-03-08 | Stop reason: SDUPTHER

## 2020-12-07 RX ORDER — TRAZODONE HYDROCHLORIDE 50 MG/1
TABLET ORAL
COMMUNITY
Start: 2020-10-28 | End: 2020-12-07 | Stop reason: SDUPTHER

## 2020-12-07 RX ORDER — LOSARTAN POTASSIUM 25 MG/1
25 TABLET ORAL DAILY
COMMUNITY
Start: 2020-11-17 | End: 2020-12-07 | Stop reason: SDUPTHER

## 2020-12-07 NOTE — PROGRESS NOTES
Chief Complaint   Patient presents with    Diabetes     2 month follow-up   1. Have you been to the ER, urgent care clinic since your last visit? Hospitalized since your last visit? Yes Where: [facility]    2. Have you seen or consulted any other health care providers outside of the [address] [place] [address] since your last visit? Include any pap smears or colon screening.  Yes Where: [facility]   Have not been taking Amlodipine

## 2020-12-07 NOTE — PROGRESS NOTES
Michael Nick is a 47 y.o. female who was seen by synchronous (real-time) audio-video technology on 12/7/2020 for Diabetes (2 month follow-up)        Assessment & Plan:   Diagnoses and all orders for this visit:    1. Hospital discharge follow-up  2. Severe persistent asthma with exacerbation  -seemed to recover fairly well. Needs to ct working with Pulm    3. Diabetes mellitus type 2 in obese (HCC) - adjusting meds based on steroids    4. Morbid obesity ([address] - to work on this after asthma improves    5. Grief  -     traZODone (DESYREL) 50 mg tablet; Take 1 Tab by mouth nightly. Subjective:     Hospital admission:    Admitted from 11/16/2020-11/17/2020 at [facility].     Losing voice again. Working with Pulm but still having trouble staying out of hospital with asthma flaring. Blood pressure up recently. Was confused about which meds to take for HTN d/t numerous hospitalizations so has been off of amlodipine. Switched from Lisinopril to Losartan too. Diabetes Mellitus:  Sugars are improving since finishing prednisone taper. Doing well on insulin  Reports no polyuria or polydipsia, no chest pain, dyspnea or TIA's, no numbness, tingling or pain in extremities  Not working on exercise  Working on diet a lot recently  Pt is a non smoker. Lab Results   Component Value Date/Time    Hemoglobin A1c (POC) 6.5 03/30/2016 10:46 AM    Hemoglobin A1c (POC) 6.4 10/28/2015 12:06 PM    Hemoglobin A1c 8.1 (H) 03/03/2020 09:52 AM    Microalb/Creat ratio (ug/mg creat.) <2.3 04/03/2019 12:30 PM    LDL, calculated 37 08/08/2019 09:21 AM    Creatinine 0.76 03/03/2020 09:52 AM      Lab Results   Component Value Date/Time    GFR est  03/03/2020 09:52 AM    GFR est non- 03/03/2020 09:52 AM      Lab Results   Component Value Date/Time    TSH 2.660 08/08/2019 09:21 AM         Prior to Admission medications    Medication Sig Start Date End Date Taking?  Authorizing Provider   predniSONE (DELTASONE) 10 mg tablet Take 10 mg by mouth daily. 10/30/20  Yes Provider, Historical   pantoprazole (PROTONIX) 40 mg tablet Take 40 mg by mouth daily. 11/17/20  Yes Provider, Historical   losartan (COZAAR) 25 mg tablet Take 25 mg by mouth daily. 11/17/20  Yes Provider, Historical   loratadine (CLARITIN) 10 mg tablet Take 10 mg by mouth daily. 11/17/20  Yes Provider, Historical   fluticasone propion-salmeteroL (Advair Diskus) 500-50 mcg/dose diskus inhaler Take 1 Puff by inhalation two (2) times a day. Yes Provider, Historical   tiotropium bromide (SPIRIVA RESPIMAT) 2.5 mcg/actuation inhaler Take 2 Puffs by inhalation daily. Yes Provider, Historical   aspirin delayed-release 81 mg tablet Take 81 mg by mouth daily. Yes Provider, Historical   albuterol (PROVENTIL VENTOLIN) 2.5 mg /3 mL (0.083 %) nebu  10/21/20  Yes Provider, Historical   magnesium oxide (MAG-OX) 400 mg tablet Take 1 Tab by mouth daily. 10/28/20  Yes Meliza Stevenson MD   HumaLOG U-100 Insulin 100 unit/mL injection 5 Units. 10/27/20  Yes Meliza Stevenson MD   insulin nph-regular human rec (HUMULIN 70-30) 100 unit/mL (70-30) inpn 15 Units by SubCUTAneous route Before breakfast and dinner. 10/23/20  Yes Meliza Stevenson MD   budesonide (PULMICORT) 1 mg/2 mL nbsp 2 mL by Nebulization route three (3) times daily as needed for Cough. 9/21/20  Yes Meliza Stevenson MD   montelukast (SINGULAIR) 10 mg tablet Take 1 Tab by mouth daily. 8/31/20  Yes Meliza Stevenson MD   albuterol (PROVENTIL HFA, VENTOLIN HFA, PROAIR HFA) 90 mcg/actuation inhaler Take 2 Puffs by inhalation every four (4) hours as needed for Wheezing. 8/20/20  Yes Meliza Stevenson MD   TRUEplus Insulin 1 mL 31 gauge x 5/16 syrg  7/15/20  Yes Provider, Historical   dulaglutide (Trulicity) 7.33 TL/6.5 mL sub-q pen 0.5 mL by SubCUTAneous route every seven (7) days. 6/26/20  Yes Meliza Stevenson MD   ascorbic acid, vitamin C, (Vitamin C) 500 mg tablet Take 500 mg by mouth daily. Yes Provider, Historical   hydroquinone (ESOTERICA, MELQUIN) 4 % topical cream Apply  to affected area two (2) times a day. 3/3/20  Yes Christopher Washington MD   cranberry extract 450 mg tab tablet Take 450 mg by mouth daily. Yes Provider, Historical   traZODone (DESYREL) 50 mg tablet  10/28/20 12/7/20  Provider, Historical   amLODIPine (NORVASC) 5 mg tablet Take 5 mg by mouth daily. 10/19/20   Provider, Historical   traZODone (DESYREL) 50 mg tablet Take 1 Tab by mouth nightly. 10/28/20   Christopher Washington MD   lisinopriL (PRINIVIL, ZESTRIL) 20 mg tablet 20 mg. 10/19/20 12/7/20  Provider, Historical   predniSONE (DELTASONE) 20 mg tablet Take 3 pills for 2 days, then 2 pills for 2 days, then 1 pill for 3 days 9/24/20 12/7/20  Christopher Washington MD   Spiriva Respimat 1.25 mcg/actuation inhaler  9/2/20 12/7/20  Provider, Historical   famotidine (PEPCID) 20 mg tablet  9/2/20 12/7/20  Provider, Historical   fluticasone propion-salmeteroL (ADVAIR/WIXELA) 250-50 mcg/dose diskus inhaler Take 1 Puff by inhalation every twelve (12) hours.  6/15/20 12/7/20  Christopher Washington MD     Patient Active Problem List   Diagnosis Code    Morbid obesity ([facility] E66.01    Diabetes mellitus type 2 in obese -- Diet controlled E11.69, E66.9    TIA (transient ischemic attack) G45.9    Hypertension I10    Ex-smoker Z87.891    Blurred vision, left eye H53.8    Weakness of left side of body R53.1    Transient ischemic attack involving right internal carotid artery G45.1     Past Medical History:   Diagnosis Date    Asthma     Diabetes ([facility]     Diabetes mellitus type 2 in obese -- Diet controlled 10/28/2014    Morbid obesity ([facility] 10/28/2014     ROS  Gen - no fever/chills  Resp - no dyspnea or cough  CV - no chest pain or DURON  Rest per HPI    Objective:     Patient-Reported Vitals 12/7/2020   Patient-Reported Pulse 103   Patient-Reported Temperature 97.2   Patient-Reported Systolic  969   Patient-Reported Diastolic 479 Physical exam:  General appearance - alert, well appearing, and in no distress  Eyes -sclera anicteric, no discharge  HEENT- normocephalic, atraumatic, moist mucous membranes, no visualized neck mass  Chest -normal respiratory effort, no visualized signs of respiratory distress  Neurological - alert, awake, normal speech, no focal findings or movement disorder noted  Psych - normal mood and affect  Skin- no apparent lesions    We discussed the expected course, resolution and complications of the diagnosis(es) in detail. Medication risks, benefits, costs, interactions, and alternatives were discussed as indicated. I advised her to contact the office if her condition worsens, changes or fails to improve as anticipated. She expressed understanding with the diagnosis(es) and plan. [facility], who was evaluated through a patient-initiated, synchronous (real-time) audio-video encounter, and/or her healthcare decision maker, is aware that it is a billable service, with coverage as determined by her insurance carrier. She provided verbal consent to proceed: Yes, and patient identification was verified. It was conducted pursuant to the emergency declaration under the [address] [place] [address] authority and the [place] and [employer] General Act. A caregiver was present when appropriate. Ability to conduct physical exam was limited. I was at home. The patient was at home.       Ryland Scales MD

## 2020-12-08 RX ORDER — DOXYCYCLINE 100 MG/1
100 CAPSULE ORAL 2 TIMES DAILY
Qty: 14 CAP | Refills: 0 | Status: SHIPPED | OUTPATIENT
Start: 2020-12-08 | End: 2020-12-15

## 2020-12-17 ENCOUNTER — VIRTUAL VISIT (OUTPATIENT)
Dept: FAMILY MEDICINE CLINIC | Age: 40
End: 2020-12-17
Payer: COMMERCIAL

## 2020-12-17 DIAGNOSIS — H53.8 BLURRY VISION, RIGHT EYE: ICD-10-CM

## 2020-12-17 DIAGNOSIS — E11.69 DIABETES MELLITUS TYPE 2 IN OBESE (HCC): ICD-10-CM

## 2020-12-17 DIAGNOSIS — R25.2 LEG CRAMPS: ICD-10-CM

## 2020-12-17 DIAGNOSIS — Z09 HOSPITAL DISCHARGE FOLLOW-UP: Primary | ICD-10-CM

## 2020-12-17 DIAGNOSIS — E66.9 DIABETES MELLITUS TYPE 2 IN OBESE (HCC): ICD-10-CM

## 2020-12-17 DIAGNOSIS — J82.83 EOSINOPHILIC ASTHMA: ICD-10-CM

## 2020-12-17 DIAGNOSIS — J45.51 SEVERE PERSISTENT ASTHMA WITH EXACERBATION: ICD-10-CM

## 2020-12-17 PROCEDURE — 99214 OFFICE O/P EST MOD 30 MIN: CPT | Performed by: FAMILY MEDICINE

## 2020-12-17 RX ORDER — LANOLIN ALCOHOL/MO/W.PET/CERES
400 CREAM (GRAM) TOPICAL DAILY
Qty: 30 TAB | Refills: 1 | Status: SHIPPED | OUTPATIENT
Start: 2020-12-17 | End: 2021-05-12 | Stop reason: SDUPTHER

## 2020-12-17 RX ORDER — PREDNISONE 20 MG/1
20 TABLET ORAL
COMMUNITY
Start: 2020-12-14 | End: 2021-08-16 | Stop reason: ALTCHOICE

## 2020-12-17 RX ORDER — ALBUTEROL SULFATE 90 UG/1
2 AEROSOL, METERED RESPIRATORY (INHALATION)
Qty: 1 INHALER | Refills: 3 | Status: SHIPPED | OUTPATIENT
Start: 2020-12-17 | End: 2021-01-21 | Stop reason: SDUPTHER

## 2020-12-17 RX ORDER — SYRING-NEEDL,DISP,INSUL,0.3 ML 30 GX5/16"
SYRINGE, EMPTY DISPOSABLE MISCELLANEOUS
Qty: 200 SYRINGE | Refills: 2 | Status: SHIPPED | OUTPATIENT
Start: 2020-12-17 | End: 2022-07-08 | Stop reason: ALTCHOICE

## 2020-12-17 RX ORDER — INSULIN LISPRO 100 [IU]/ML
25 INJECTION, SOLUTION INTRAVENOUS; SUBCUTANEOUS
Qty: 1 ML | Refills: 0
Start: 2020-12-17 | End: 2021-01-11 | Stop reason: SDUPTHER

## 2020-12-17 NOTE — PROGRESS NOTES
Chief Complaint   Patient presents with   [facility] Follow Up     Discharged 12/15/20 from [address]   1. Have you been to the ER, urgent care clinic since your last visit? Hospitalized since your last visit? Yes Where: [facility]    2. Have you seen or consulted any other health care providers outside of the [address] [place] [address] since your last visit? Include any pap smears or colon screening. Yes Where: [facility]  NP Elfego Keating

## 2020-12-17 NOTE — PROGRESS NOTES
Pool Malik is a 47 y.o. female who was seen by synchronous (real-time) audio-video technology on 12/17/2020 for Hospital Follow Up (Discharged 12/15/20 from [address] [place] [address])    Assessment & Plan:   Diagnoses and all orders for this visit:    1. Hospital discharge follow-up - improving breathing but concerns with DM control d/t steroids. 3. Diabetes mellitus type 2 in obese (HCC) - adjusting insulin  -     insulin nph-regular human rec (HUMULIN 70-30) 100 unit/mL (70-30) inpn; 15 Units by SubCUTAneous route Before breakfast and dinner.  -     HumaLOG U-100 Insulin 100 unit/mL injection; 5 Units. 4. Blurry vision, right eye - r/o retinal detachment from Ophtho. If no visual concerns, would need MRI to eval for CVA    5. Severe persistent asthma with exacerbation  6. Eosinophilic asthma  - to start Nucala soon with Pulm, on very high steroid during hospitalization  -     albuterol (PROVENTIL HFA, VENTOLIN HFA, PROAIR HFA) 90 mcg/actuation inhaler; Take 2 Puffs by inhalation every four (4) hours as needed for Wheezing.  -     TRUEplus Insulin 1 mL 31 gauge x 5/16 syrg; Use with insulin    2. Leg cramps  -     magnesium oxide (MAG-OX) 400 mg tablet; Take 1 Tab by mouth daily. Update-spoke with patient again on 12/31/2020 and was seen by ophthalmology who determined that vision changes were most likely just related to hyperglycemia because of steroids. She also had another asthma flare leading to a higher dose of prednisone at 40 mg starting on 12/25/2020. Adjusting insulin further by increasing 70/30 insulin to 35 units twice daily with breakfast and dinner as well as continuing Humalog 25 units with lunch and 25 units at bedtime to be adjusted with sliding scale depending on sugars. Follow-up and Dispositions    · Return in about 2 weeks (around 12/31/2020).            Subjective:     Hospitalized     Will start Nucala with pulm for ashtma    Right eye is dark on the lateral area and blurry on nasal side since this am.  Has not seem am.  Has been going to [facility].  No sig pain. Not red.  + floaters. No flashes of light    Sugars are running high on steroids. Was up to 500s. Out of 70/30 and just using humalog 25 units TID with meals    Prior to Admission medications    Medication Sig Start Date End Date Taking? Authorizing Provider   predniSONE (DELTASONE) 20 mg tablet Take 20 mg by mouth daily. 12/14/20  Yes Provider, Historical   pantoprazole (PROTONIX) 40 mg tablet Take 40 mg by mouth daily. 11/17/20  Yes Provider, Historical   loratadine (CLARITIN) 10 mg tablet Take 10 mg by mouth daily. 11/17/20  Yes Provider, Historical   fluticasone propion-salmeteroL (Advair Diskus) 500-50 mcg/dose diskus inhaler Take 1 Puff by inhalation two (2) times a day. Yes Provider, Historical   tiotropium bromide (SPIRIVA RESPIMAT) 2.5 mcg/actuation inhaler Take 2 Puffs by inhalation daily. Yes Provider, Historical   aspirin delayed-release 81 mg tablet Take 81 mg by mouth daily. Yes Provider, Historical   amLODIPine (NORVASC) 5 mg tablet Take 1 Tab by mouth daily. Take 5 mg by mouth daily. 12/7/20  Yes Lv Harrell MD   traZODone (DESYREL) 50 mg tablet Take 1 Tab by mouth nightly. 12/7/20  Yes Lv Harrell MD   losartan (COZAAR) 50 mg tablet Take 1 Tab by mouth daily. Increased dose 12/7/20  Yes Lv Harrell MD   dulaglutide (Trulicity) 1.5 GJ/0.6 mL sub-q pen 0.5 mL by SubCUTAneous route every seven (7) days. Increased dose 12/7/20  Yes Lv Harrell MD   albuterol (PROVENTIL VENTOLIN) 2.5 mg /3 mL (0.083 %) nebu  10/21/20  Yes Provider, Historical   magnesium oxide (MAG-OX) 400 mg tablet Take 1 Tab by mouth daily. 10/28/20  Yes Lv Harrell MD   HumaLOG U-100 Insulin 100 unit/mL injection 5 Units. Patient taking differently: 25 Units. 10/27/20  Yes Lv Harrell MD   budesonide (PULMICORT) 1 mg/2 mL nbsp 2 mL by Nebulization route three (3) times daily as needed for Cough.  9/21/20 Yes Christopher Washington MD   montelukast (SINGULAIR) 10 mg tablet Take 1 Tab by mouth daily. 8/31/20  Yes Christopher Washington MD   albuterol (PROVENTIL HFA, VENTOLIN HFA, PROAIR HFA) 90 mcg/actuation inhaler Take 2 Puffs by inhalation every four (4) hours as needed for Wheezing. 8/20/20  Yes Christopher Washington MD   TRUEplus Insulin 1 mL 31 gauge x 5/16 syrg  7/15/20  Yes Provider, Historical   ascorbic acid, vitamin C, (Vitamin C) 500 mg tablet Take 500 mg by mouth daily. Yes Provider, Historical   cranberry extract 450 mg tab tablet Take 450 mg by mouth daily. Yes Provider, Historical   predniSONE (DELTASONE) 10 mg tablet Take 10 mg by mouth daily. 10/30/20   Provider, Historical   insulin nph-regular human rec (HUMULIN 70-30) 100 unit/mL (70-30) inpn 15 Units by SubCUTAneous route Before breakfast and dinner. 10/23/20   Christopher Washington MD   hydroquinone (ESOTERICA, MELQUIN) 4 % topical cream Apply  to affected area two (2) times a day.  3/3/20   Christopher Washington MD     Patient Active Problem List   Diagnosis Code    Morbid obesity ([facility] E66.01    Diabetes mellitus type 2 in obese -- Diet controlled E11.69, E66.9    TIA (transient ischemic attack) G45.9    Hypertension I10    Ex-smoker Z87.891    Blurred vision, left eye H53.8    Weakness of left side of body R53.1    Transient ischemic attack involving right internal carotid artery G45.1     Patient Active Problem List    Diagnosis Date Noted    Transient ischemic attack involving right internal carotid artery 07/18/2016    Blurred vision, left eye 07/15/2016    Weakness of left side of body 07/15/2016    TIA (transient ischemic attack) 07/14/2016    Hypertension 07/14/2016    Ex-smoker 07/14/2016    Morbid obesity ([facility] 10/28/2014    Diabetes mellitus type 2 in obese -- Diet controlled 10/28/2014     Past Medical History:   Diagnosis Date    Asthma     Diabetes ([facility]     Diabetes mellitus type 2 in obese -- Diet controlled 10/28/2014    Morbid [facility] ([facility] 10/28/2014     Past Surgical History:   Procedure Laterality Date    HX GYN      3 C-sections    HX GYN      Miscarriage       ROS  Gen - no fever/chills  Resp - no dyspnea or cough  CV - no chest pain or DURON  Rest per HPI    Objective:     Patient-Reported Vitals 12/17/2020   Patient-Reported Pulse 107   Patient-Reported Temperature -   Patient-Reported Systolic  456   Patient-Reported Diastolic 89        Physical exam:  General appearance - alert, well appearing, and in no distress  Eyes -sclera anicteric, no discharge  HEENT- normocephalic, atraumatic, moist mucous membranes, no visualized neck mass  Chest -normal respiratory effort, no visualized signs of respiratory distress  Neurological - alert, awake, normal speech, no focal findings or movement disorder noted  Psych - normal mood and affect  Skin- no apparent lesions    We discussed the expected course, resolution and complications of the diagnosis(es) in detail. Medication risks, benefits, costs, interactions, and alternatives were discussed as indicated. I advised her to contact the office if her condition worsens, changes or fails to improve as anticipated. She expressed understanding with the diagnosis(es) and plan. [facility], who was evaluated through a patient-initiated, synchronous (real-time) audio-video encounter, and/or her healthcare decision maker, is aware that it is a billable service, with coverage as determined by her insurance carrier. She provided verbal consent to proceed: Yes, and patient identification was verified. It was conducted pursuant to the emergency declaration under the [facility], [address] [place] [address] and the [employer] and [employer] General Act. A caregiver was present when appropriate. Ability to conduct physical exam was limited. I was at office. The patient was at home.       Mary Bolanos MD

## 2020-12-18 DIAGNOSIS — E11.69 DIABETES MELLITUS TYPE 2 IN OBESE (HCC): ICD-10-CM

## 2020-12-18 DIAGNOSIS — E66.9 DIABETES MELLITUS TYPE 2 IN OBESE (HCC): ICD-10-CM

## 2020-12-18 NOTE — TELEPHONE ENCOUNTER
Eryn Bro called regarding insulin 70/30 was never received at the pharmacy.  Requesting medication to go to [facility]

## 2020-12-18 NOTE — PATIENT INSTRUCTIONS
As we discussed by phone your new insulin regimen is as follows: 1.  70/30 insulin at 35 units before breakfast 
2. Humalog 25 units before lunch 3.  70/30 insulin at 35 units before dinner 4. Sliding scale insulin at bedtimeHumalog 25 units to be adjusted with scale below 5. Keep in mind that we will need to adjust your insulin doses based on your prednisone dose so let me know if you when your prednisone is being adjusted by your lung doctor Sliding Scale Insulin (Humalog insulin) Blood sugar  Bedtime Less than 80  Subtract 10    
   subtract 5 units [id number]   take regular dose (25 units) 251-300  Add 6     
301-350  Add 8     
351-400  Add 10

## 2021-01-11 DIAGNOSIS — E11.69 DIABETES MELLITUS TYPE 2 IN OBESE (HCC): ICD-10-CM

## 2021-01-11 DIAGNOSIS — E66.9 DIABETES MELLITUS TYPE 2 IN OBESE (HCC): ICD-10-CM

## 2021-01-11 RX ORDER — INSULIN LISPRO 100 [IU]/ML
INJECTION, SOLUTION INTRAVENOUS; SUBCUTANEOUS
Qty: 1 ML | Refills: 1
Start: 2021-01-11 | End: 2021-08-16 | Stop reason: SDUPTHER

## 2021-01-14 ENCOUNTER — VIRTUAL VISIT (OUTPATIENT)
Dept: FAMILY MEDICINE CLINIC | Age: 41
End: 2021-01-14
Payer: COMMERCIAL

## 2021-01-14 DIAGNOSIS — H53.8 BLURRY VISION, RIGHT EYE: ICD-10-CM

## 2021-01-14 DIAGNOSIS — J45.50 SEVERE PERSISTENT ASTHMA WITHOUT COMPLICATION: ICD-10-CM

## 2021-01-14 DIAGNOSIS — E11.69 DIABETES MELLITUS TYPE 2 IN OBESE (HCC): Primary | ICD-10-CM

## 2021-01-14 DIAGNOSIS — E66.9 DIABETES MELLITUS TYPE 2 IN OBESE (HCC): Primary | ICD-10-CM

## 2021-01-14 PROCEDURE — 99214 OFFICE O/P EST MOD 30 MIN: CPT | Performed by: FAMILY MEDICINE

## 2021-01-14 RX ORDER — MONTELUKAST SODIUM 10 MG/1
10 TABLET ORAL DAILY
Qty: 90 TAB | Refills: 1 | Status: SHIPPED | OUTPATIENT
Start: 2021-01-14 | End: 2021-05-12 | Stop reason: SDUPTHER

## 2021-01-14 RX ORDER — PANTOPRAZOLE SODIUM 40 MG/1
40 TABLET, DELAYED RELEASE ORAL DAILY
Qty: 90 TAB | Refills: 0 | Status: SHIPPED | OUTPATIENT
Start: 2021-01-14 | End: 2021-05-12 | Stop reason: SDUPTHER

## 2021-01-14 NOTE — PROGRESS NOTES
Ruth Reeves (: 1980) is a 47 y.o. female, established patient, here for evaluation of the following chief complaint(s):   Follow-up (2week)       ASSESSMENT/PLAN:  1. Diabetes mellitus type 2 in obese ([address]- sugars better but still too high. Adjusting as follow:  - 70/30 insulin x 40 units with breakfast (increase)  - Humalog x 30 units with lunch(increase)  - 70/30 insulin x 35 units with dinner  - Humalog x 25 units at bedtime to be adjusted with sliding scale depending on sugars  - Plan to decr insulin back to prev amount after dropping Prednisone    2. Blurry vision, right eye- sugars better but vision still concerning. To get second opinion    3. Severe persistent asthma without complication - improving with Nucala and planning to taper Prednisone. Following with Pulm  -     montelukast (SINGULAIR) 10 mg tablet; Take 1 Tab by mouth daily. , Normal, Disp-90 Tab, R-1    Return in about 2 weeks (around 2021). SUBJECTIVE/OBJECTIVE:    Breathing improving. Will have second injection of Nucala soon and plans to decr Prednisone 5 mg. DM improving - sugars are sig improving with mostly 100-200s. Pre-breakfast are mostly 100s, pre dinners are mostly 200s    Vision getting worse. Having worsening right eye vision. Only able hapes and colors. Feels like she could not count fingers. Has not improved at all with better glycemic. Saw Dr. Raymond Gomes and thought to be largely related to uncontrolled sugars. Patient-Reported Vitals 2021   Patient-Reported Pulse 117   Patient-Reported Temperature -   Patient-Reported Systolic  695   Patient-Reported Diastolic 90       Physical exam:  General appearance - alert, well appearing, and in no distress  Eyes -sclera anicteric, no discharge  HEENT- normocephalic, atraumatic, moist mucous membranes, no visualized neck mass  Chest -normal respiratory effort, no visualized signs of respiratory distress.   Voice returned to normal  Neurological - alert, awake, normal speech, no focal findings or movement disorder noted  Psych - normal mood and affect  Skin- no apparent lesions      On this date 01/14/21 I have spent 30 minutes reviewing previous notes, test results and face to face (virtual) with the patient discussing the diagnosis and importance of compliance with the treatment plan as well as documenting on the day of the visit. Charbel Abrams  is being evaluated by a Virtual Visit (video visit) encounter to address concerns as mentioned above. A caregiver was present when appropriate. Due to this being a TeleHealth encounter (During LEULF-05 public health emergency), evaluation of the following organ systems was limited: Vitals/Constitutional/EENT/Resp/CV/GI//MS/Neuro/Skin/Heme-Lymph-Imm. Pursuant to the emergency declaration under the [address] [place] [address], [address] [place] [address] authority and the [employer] and [employer] Act, this Virtual Visit was conducted with patient's (and/or legal guardian's) consent, to reduce the patient's risk of exposure to COVID-19 and provide necessary medical care. The patient (and/or legal guardian) has also been advised to contact this office for worsening conditions or problems, and seek emergency medical treatment and/or call 911 if deemed necessary. Patient identification was verified at the start of the visit: YES    Services were provided through a video synchronous discussion virtually to substitute for in-person clinic visit. Patient was located at home and provider was located in office or at home. An electronic signature was used to authenticate this note.   -- Baron Khan MD

## 2021-01-14 NOTE — PATIENT INSTRUCTIONS
New insulin regimen starting 1/14/21 1.  70/30 insulin x 40 units with breakfast (increase) 2. Humalog x 30 units with lunch(increase) 3. 70/30 insulin x 35 units with dinner 4. Humalog x 25 units at bedtime to be adjusted with sliding scale depending on sugars After decreasing Prednisone to 5 mg daily, can start adjusting insulin as follows: 1.  70/30 insulin x 35 units with breakfast 
2. Humalog 25 units with lunch 3. 70/30 insulin x 35 units with dinner 4. Humalog x 25 units at bedtime to be adjusted with sliding scale depending on sugars 5. May need to decrease insulin if sugars drop < 80 so please call and let me know

## 2021-01-14 NOTE — PROGRESS NOTES
Chief Complaint   Patient presents with    Follow-up     2week   1. Have you been to the ER, urgent care clinic since your last visit? Hospitalized since your last visit? No    2. Have you seen or consulted any other health care providers outside of the [address] [place] [address] since your last visit? Include any pap smears or colon screening.  Yes Where: Pulmonary      Vision remains blurred and scheduled see Dr Josie Choi on 1/19/21 and second Nucala on 1/18/21  See blood sugars in my chart

## 2021-01-22 RX ORDER — ALBUTEROL SULFATE 90 UG/1
2 AEROSOL, METERED RESPIRATORY (INHALATION)
Qty: 3 INHALER | Refills: 1 | Status: SHIPPED | OUTPATIENT
Start: 2021-01-22 | End: 2021-05-12 | Stop reason: SDUPTHER

## 2021-03-08 ENCOUNTER — VIRTUAL VISIT (OUTPATIENT)
Dept: FAMILY MEDICINE CLINIC | Age: 41
End: 2021-03-08
Payer: COMMERCIAL

## 2021-03-08 DIAGNOSIS — Z00.00 WELL ADULT EXAM: ICD-10-CM

## 2021-03-08 DIAGNOSIS — J45.50 SEVERE PERSISTENT ASTHMA WITHOUT COMPLICATION: ICD-10-CM

## 2021-03-08 DIAGNOSIS — J82.83 EOSINOPHILIC ASTHMA: ICD-10-CM

## 2021-03-08 DIAGNOSIS — H18.603 KERATOCONUS OF BOTH EYES: ICD-10-CM

## 2021-03-08 DIAGNOSIS — I10 ESSENTIAL HYPERTENSION: ICD-10-CM

## 2021-03-08 DIAGNOSIS — R19.7 DIARRHEA, UNSPECIFIED TYPE: ICD-10-CM

## 2021-03-08 DIAGNOSIS — H53.8 BLURRY VISION, RIGHT EYE: ICD-10-CM

## 2021-03-08 DIAGNOSIS — M62.838 MUSCLE SPASM: ICD-10-CM

## 2021-03-08 DIAGNOSIS — F43.21 GRIEF: ICD-10-CM

## 2021-03-08 DIAGNOSIS — E11.69 DIABETES MELLITUS TYPE 2 IN OBESE (HCC): Primary | ICD-10-CM

## 2021-03-08 DIAGNOSIS — E66.9 DIABETES MELLITUS TYPE 2 IN OBESE (HCC): Primary | ICD-10-CM

## 2021-03-08 PROCEDURE — 99214 OFFICE O/P EST MOD 30 MIN: CPT | Performed by: FAMILY MEDICINE

## 2021-03-08 RX ORDER — LOSARTAN POTASSIUM 50 MG/1
50 TABLET ORAL DAILY
Qty: 90 TAB | Refills: 1 | Status: SHIPPED | OUTPATIENT
Start: 2021-03-08 | End: 2021-05-12 | Stop reason: SDUPTHER

## 2021-03-08 RX ORDER — CYCLOBENZAPRINE HCL 5 MG
5 TABLET ORAL
Qty: 30 TAB | Refills: 0 | Status: SHIPPED | OUTPATIENT
Start: 2021-03-08 | End: 2021-09-13 | Stop reason: ALTCHOICE

## 2021-03-08 RX ORDER — AMLODIPINE BESYLATE 5 MG/1
5 TABLET ORAL DAILY
Qty: 90 TAB | Refills: 1 | Status: SHIPPED | OUTPATIENT
Start: 2021-03-08 | End: 2021-05-12 | Stop reason: SDUPTHER

## 2021-03-08 RX ORDER — TRAZODONE HYDROCHLORIDE 50 MG/1
50 TABLET ORAL
Qty: 90 TAB | Refills: 0 | Status: SHIPPED | OUTPATIENT
Start: 2021-03-08 | End: 2021-05-12 | Stop reason: SDUPTHER

## 2021-03-08 RX ORDER — CETIRIZINE HCL 10 MG
10 TABLET ORAL
COMMUNITY

## 2021-03-08 RX ORDER — SODIUM CHLORIDE 50 MG/ML
SOLUTION/ DROPS OPHTHALMIC
COMMUNITY
Start: 2021-01-21 | End: 2021-12-07 | Stop reason: ALTCHOICE

## 2021-03-08 RX ORDER — CYCLOBENZAPRINE HCL 5 MG
5 TABLET ORAL
Qty: 30 TAB | Refills: 0 | Status: SHIPPED | OUTPATIENT
Start: 2021-03-08 | End: 2021-03-08 | Stop reason: SDUPTHER

## 2021-03-08 RX ORDER — DULAGLUTIDE 0.75 MG/.5ML
0.75 INJECTION, SOLUTION SUBCUTANEOUS
Qty: 4 PEN | Refills: 0 | Status: SHIPPED | OUTPATIENT
Start: 2021-03-08 | End: 2021-05-05 | Stop reason: SDUPTHER

## 2021-03-08 NOTE — PROGRESS NOTES
[facility] (: 1980) is a 46 y.o. female, established patient, here for evaluation of the following chief complaint(s):   Diabetes (Folllow-up)       ASSESSMENT/PLAN:  Diagnoses and all orders for this visit:    1. Diabetes mellitus type 2 in obese ([address]- sugars much better with insulin changes and while decreasing Prednisone dose. Concern for SE with Trulicity so decreasing dose. -     dulaglutide (Trulicity) 4.34 KW/8.4 mL sub-q pen; 0.5 mL by SubCUTAneous route every seven (7) days.  -     HEMOGLOBIN A1C WITH EAG; Future  -     LIPID PANEL; Future  -     TSH 3RD GENERATION; Future  -     URINALYSIS W/ RFLX MICROSCOPIC; Future  -     MICROALBUMIN, UR, RAND W/ MICROALB/CREAT RATIO; Future    2. Blurry vision, right eye - working with Ophtho and hopeful  -     CBC W/O DIFF; Future    3. Severe persistent asthma without complication  4. Eosinophilic asthma  - Doing well with Nucala    5. Essential hypertension - controlled  -     URINALYSIS W/ RFLX MICROSCOPIC; Future    6. Well adult exam - checking labs  -     HEMOGLOBIN A1C WITH EAG; Future  -     LIPID PANEL; Future  -     TSH 3RD GENERATION; Future  -     CBC W/O DIFF; Future  -     URINALYSIS W/ RFLX MICROSCOPIC; Future  -     VITAMIN D, 25 HYDROXY; Future  -     MICROALBUMIN, UR, RAND W/ MICROALB/CREAT RATIO; Future    7. Keratoconus of both eyes - as above    8. Muscle spasm- checking labs, trial of flexeril. Ct stretching, massage, hydration  -     cyclobenzaprine (FLEXERIL) 5 mg tablet; Take 1 Tab by mouth nightly. -     METABOLIC PANEL, COMPREHENSIVE; Future    9. Diarrhea, unspecified type - unclear etiology, immodium for now. If not improving, to get stool studies. May improve with decr dose of Trulicity. Awaiting labs  -     METABOLIC PANEL, COMPREHENSIVE; Future          Return in about 4 weeks (around 2021). SUBJECTIVE/OBJECTIVE:  Doing well overall. Recently with cramps in feet, hands, and sides. Not much of an appetite. Feeling very thirsty. Diarrhea for a week about 4x per day. Thinks she has had a mild sore throat. No dyspnea, fatigue, HA, chills, fever, anosmia, dysguesia. Sleeping more during day and less at night. Breathing improving with Nucala. Pred down to 5 mg daily. DM improving - sugars are sig improving with mostly 100-150s. Vision issue ongoing. Working with Dr. Lamar Cuenca and Dr. Sandra Alston on this. ROS  Gen - no fever/chills  Resp - no dyspnea or cough  CV - no chest pain or DURON  Rest per HPI    Patient-Reported Vitals 1/14/2021   Patient-Reported Pulse 117   Patient-Reported Temperature -   Patient-Reported Systolic  978   Patient-Reported Diastolic 90       Physical exam:  General appearance - alert, well appearing, and in no distress  Eyes -sclera anicteric, no discharge  HEENT- normocephalic, atraumatic, moist mucous membranes, no visualized neck mass  Chest -normal respiratory effort, no visualized signs of respiratory distress. Voice returned to normal  Neurological - alert, awake, normal speech, no focal findings or movement disorder noted  Psych - normal mood and affect  Skin- no apparent lesions      On this date 03/08/21 I have spent 30 minutes reviewing previous notes, test results and face to face (virtual) with the patient discussing the diagnosis and importance of compliance with the treatment plan as well as documenting on the day of the visit. [address] is being evaluated by a Virtual Visit (video visit) encounter to address concerns as mentioned above. A caregiver was present when appropriate. Due to this being a TeleHealth encounter (During [place] [address] public health emergency), evaluation of the following organ systems was limited: Vitals/Constitutional/EENT/Resp/CV/GI//MS/Neuro/Skin/Heme-Lymph-Imm.   Pursuant to the emergency declaration under the [facility] waiver authority and the [employer] Act, this Virtual Visit was conducted with patient's (and/or legal guardian's) consent, to reduce the patient's risk of exposure to COVID-19 and provide necessary medical care. The patient (and/or legal guardian) has also been advised to contact this office for worsening conditions or problems, and seek emergency medical treatment and/or call 911 if deemed necessary. Patient identification was verified at the start of the visit: YES    Services were provided through a video synchronous discussion virtually to substitute for in-person clinic visit. Patient was located at home and provider was located in office or at home. An electronic signature was used to authenticate this note.   -- Ivone Lee MD

## 2021-03-08 NOTE — PROGRESS NOTES
Chief Complaint   Patient presents with    Diabetes     Folllow-up   1. Have you been to the ER, urgent care clinic since your last visit? Hospitalized since your last visit? No    2. Have you seen or consulted any other health care providers outside of the [address] [place] [address] since your last visit? Include any pap smears or colon screening.  Yes Where: Dr Isabelle Jackson, Corneal Specialist,Pulmonary     Discus cramping hands and feet

## 2021-04-10 ENCOUNTER — IMMUNIZATION (OUTPATIENT)
Dept: FAMILY MEDICINE CLINIC | Age: 41
End: 2021-04-10
Payer: COMMERCIAL

## 2021-04-10 DIAGNOSIS — Z23 ENCOUNTER FOR IMMUNIZATION: Primary | ICD-10-CM

## 2021-04-10 PROCEDURE — 0001A COVID-19, MRNA, LNP-S, PF, 30MCG/0.3ML DOSE(PFIZER): CPT | Performed by: FAMILY MEDICINE

## 2021-04-10 PROCEDURE — 91300 COVID-19, MRNA, LNP-S, PF, 30MCG/0.3ML DOSE(PFIZER): CPT | Performed by: FAMILY MEDICINE

## 2021-04-19 ENCOUNTER — VIRTUAL VISIT (OUTPATIENT)
Dept: FAMILY MEDICINE CLINIC | Age: 41
End: 2021-04-19
Payer: COMMERCIAL

## 2021-04-19 DIAGNOSIS — E66.9 DIABETES MELLITUS TYPE 2 IN OBESE (HCC): Primary | ICD-10-CM

## 2021-04-19 DIAGNOSIS — E11.69 DIABETES MELLITUS TYPE 2 IN OBESE (HCC): Primary | ICD-10-CM

## 2021-04-19 DIAGNOSIS — J82.83 EOSINOPHILIC ASTHMA: ICD-10-CM

## 2021-04-19 DIAGNOSIS — H18.603 KERATOCONUS OF BOTH EYES: ICD-10-CM

## 2021-04-19 DIAGNOSIS — H53.8 BLURRY VISION, RIGHT EYE: ICD-10-CM

## 2021-04-19 PROCEDURE — 99213 OFFICE O/P EST LOW 20 MIN: CPT | Performed by: FAMILY MEDICINE

## 2021-04-19 NOTE — PROGRESS NOTES
Chief Complaint   Patient presents with    Diabetes     6 week follow-up   1. Have you been to the ER, urgent care clinic since your last visit? Hospitalized since your last visit? No    2. Have you seen or consulted any other health care providers outside of the [address] [place] [address] since your last visit? Include any pap smears or colon screening.  Yes Where: [facility]     Dr Hunter Fragoso ,Dr Junior Mancini

## 2021-04-19 NOTE — PROGRESS NOTES
[facility] (: 1980) is a 46 y.o. female, established patient, here for evaluation of the following chief complaint(s):   Diabetes (6 week follow-up)       ASSESSMENT/PLAN:  Improving overall. Getting labs soon. Ct following with Ophtho and Pulm. Sugars much improved. Sig home stressors with  needing surgery. BP high today related to stress. To recheck soon. 1. Diabetes mellitus type 2 in obese ([address]  2. Eosinophilic asthma  3. Keratoconus of both eyes  4. Blurry vision, right eye      Return in about 3 months (around 2021), or if symptoms worsen or fail to improve. SUBJECTIVE/OBJECTIVE:  Since last appt, doing well. Working with Dr. Dacia Simon and Dr. Shyam Guaman on vision issue. Hoping to avoid corneal surgery. Seems to be slowly improving.  in hospital - dealing with bulging discs in cervical spine and will have sgy at [facility]. Doing well with Nucala injections for asthma. Sig improving. Ct following with Pulm. Had COVID vaccine and doing well. Diabetes Mellitus: off prednisone  Taking meds, home glucose monitoring: is performed regularly  Reports no polyuria or polydipsia, no chest pain, dyspnea or TIA's, no numbness, tingling or pain in extremities  Working on weight loss with diet and starting to exercise. Pt is a non smoker.     Lab Results   Component Value Date/Time    Hemoglobin A1c (POC) 6.5 2016 10:46 AM    Hemoglobin A1c (POC) 6.4 10/28/2015 12:06 PM    Hemoglobin A1c 8.1 (H) 2020 09:52 AM    Microalb/Creat ratio (ug/mg creat.) <2.3 2019 12:30 PM    LDL, calculated 37 2019 09:21 AM    Creatinine 0.76 2020 09:52 AM      Lab Results   Component Value Date/Time    GFR est  2020 09:52 AM    GFR est non- 2020 09:52 AM      Lab Results   Component Value Date/Time    TSH 2.660 2019 09:21 AM       ROS  Gen - no fever/chills  Resp - per HPI  CV - no chest pain  Msk - still dealing with foot cramps  Rest per HPI    Patient-Reported Vitals 1/14/2021   Patient-Reported Pulse 117   Patient-Reported Temperature -   Patient-Reported Systolic  080   Patient-Reported Diastolic 90       Physical exam:  General appearance - alert, well appearing, and in no distress  Eyes -sclera anicteric, no discharge  HEENT- normocephalic, atraumatic, moist mucous membranes, no visualized neck mass  Chest -normal respiratory effort, no visualized signs of respiratory distress  Neurological - alert, awake, normal speech, no focal findings or movement disorder noted  Psych - normal mood and affect  Skin- bilat hyperpigmented melasma type lesions    On this date 04/19/2021 I have spent 20 minutes reviewing previous notes, test results and face to face (virtual) with the patient discussing the diagnosis and importance of compliance with the treatment plan as well as documenting on the day of the visit. [facility], was evaluated through a synchronous (real-time) audio-video encounter. The patient (or guardian if applicable) is aware that this is a billable service. Verbal consent to proceed has been obtained within the past 12 months. The visit was conducted pursuant to the emergency declaration under the [facility], [address] [place] [address] authority and the [employer] General Act. Patient identification was verified, and a caregiver was present when appropriate. The patient was located in a state where the provider was credentialed to provide care. An electronic signature was used to authenticate this note.   -- Orlando Haywood MD

## 2021-05-01 ENCOUNTER — IMMUNIZATION (OUTPATIENT)
Dept: FAMILY MEDICINE CLINIC | Age: 41
End: 2021-05-01
Payer: COMMERCIAL

## 2021-05-01 DIAGNOSIS — Z23 ENCOUNTER FOR IMMUNIZATION: Primary | ICD-10-CM

## 2021-05-01 PROCEDURE — 91300 COVID-19, MRNA, LNP-S, PF, 30MCG/0.3ML DOSE(PFIZER): CPT | Performed by: FAMILY MEDICINE

## 2021-05-01 PROCEDURE — 0002A COVID-19, MRNA, LNP-S, PF, 30MCG/0.3ML DOSE(PFIZER): CPT | Performed by: FAMILY MEDICINE

## 2021-05-05 DIAGNOSIS — E66.9 DIABETES MELLITUS TYPE 2 IN OBESE (HCC): ICD-10-CM

## 2021-05-05 DIAGNOSIS — E11.69 DIABETES MELLITUS TYPE 2 IN OBESE (HCC): ICD-10-CM

## 2021-05-06 RX ORDER — DULAGLUTIDE 0.75 MG/.5ML
0.75 INJECTION, SOLUTION SUBCUTANEOUS
Qty: 12 PEN | Refills: 1 | Status: SHIPPED | OUTPATIENT
Start: 2021-05-06 | End: 2021-12-07 | Stop reason: SDUPTHER

## 2021-05-12 ENCOUNTER — HOSPITAL ENCOUNTER (EMERGENCY)
Age: 41
Discharge: HOME | End: 2021-05-12
Attending: EMERGENCY MEDICINE
Payer: COMMERCIAL

## 2021-05-12 VITALS
HEART RATE: 118 BPM | RESPIRATION RATE: 22 BRPM | BODY MASS INDEX: 48.82 KG/M2 | HEIGHT: 65 IN | SYSTOLIC BLOOD PRESSURE: 146 MMHG | WEIGHT: 293 LBS | TEMPERATURE: 98.7 F | DIASTOLIC BLOOD PRESSURE: 84 MMHG | OXYGEN SATURATION: 95 %

## 2021-05-12 DIAGNOSIS — R25.2 LEG CRAMPS: ICD-10-CM

## 2021-05-12 DIAGNOSIS — F43.21 GRIEF: ICD-10-CM

## 2021-05-12 DIAGNOSIS — J45.51 SEVERE PERSISTENT ASTHMA WITH EXACERBATION: ICD-10-CM

## 2021-05-12 DIAGNOSIS — J45.901 ASTHMA WITH ACUTE EXACERBATION, UNSPECIFIED ASTHMA SEVERITY, UNSPECIFIED WHETHER PERSISTENT: Primary | ICD-10-CM

## 2021-05-12 DIAGNOSIS — J45.50 SEVERE PERSISTENT ASTHMA WITHOUT COMPLICATION: ICD-10-CM

## 2021-05-12 DIAGNOSIS — I10 ESSENTIAL HYPERTENSION: ICD-10-CM

## 2021-05-12 LAB
COMMENT, HOLDF: NORMAL
SAMPLES BEING HELD,HOLD: NORMAL

## 2021-05-12 PROCEDURE — 74011000250 HC RX REV CODE- 250: Performed by: PHYSICIAN ASSISTANT

## 2021-05-12 PROCEDURE — 94640 AIRWAY INHALATION TREATMENT: CPT

## 2021-05-12 PROCEDURE — 99284 EMERGENCY DEPT VISIT MOD MDM: CPT

## 2021-05-12 PROCEDURE — 77030013140 HC MSK NEB VYRM -A

## 2021-05-12 PROCEDURE — 93005 ELECTROCARDIOGRAM TRACING: CPT

## 2021-05-12 RX ORDER — AMLODIPINE BESYLATE 5 MG/1
5 TABLET ORAL DAILY
Qty: 90 TAB | Refills: 1 | Status: SHIPPED | OUTPATIENT
Start: 2021-05-12 | End: 2021-08-16 | Stop reason: SDUPTHER

## 2021-05-12 RX ORDER — FLUTICASONE PROPIONATE AND SALMETEROL 500; 50 UG/1; UG/1
1 POWDER RESPIRATORY (INHALATION) 2 TIMES DAILY
Qty: 3 INHALER | Refills: 1 | Status: SHIPPED | OUTPATIENT
Start: 2021-05-12 | End: 2021-08-16 | Stop reason: SDUPTHER

## 2021-05-12 RX ORDER — LOSARTAN POTASSIUM 50 MG/1
50 TABLET ORAL DAILY
Qty: 90 TAB | Refills: 1 | Status: SHIPPED | OUTPATIENT
Start: 2021-05-12 | End: 2021-07-20 | Stop reason: SDUPTHER

## 2021-05-12 RX ORDER — TRAZODONE HYDROCHLORIDE 50 MG/1
50 TABLET ORAL
Qty: 90 TAB | Refills: 0 | Status: SHIPPED | OUTPATIENT
Start: 2021-05-12 | End: 2021-09-13 | Stop reason: ALTCHOICE

## 2021-05-12 RX ORDER — PANTOPRAZOLE SODIUM 40 MG/1
40 TABLET, DELAYED RELEASE ORAL DAILY
Qty: 90 TAB | Refills: 0 | Status: SHIPPED | OUTPATIENT
Start: 2021-05-12 | End: 2021-06-08

## 2021-05-12 RX ORDER — MONTELUKAST SODIUM 10 MG/1
10 TABLET ORAL DAILY
Qty: 90 TAB | Refills: 1 | Status: SHIPPED | OUTPATIENT
Start: 2021-05-12 | End: 2021-07-20 | Stop reason: SDUPTHER

## 2021-05-12 RX ORDER — LANOLIN ALCOHOL/MO/W.PET/CERES
400 CREAM (GRAM) TOPICAL DAILY
Qty: 90 TAB | Refills: 1 | Status: SHIPPED | OUTPATIENT
Start: 2021-05-12

## 2021-05-12 RX ORDER — ALBUTEROL SULFATE 90 UG/1
2 AEROSOL, METERED RESPIRATORY (INHALATION)
Qty: 3 INHALER | Refills: 1 | Status: SHIPPED | OUTPATIENT
Start: 2021-05-12 | End: 2021-08-16 | Stop reason: SDUPTHER

## 2021-05-12 RX ORDER — BUDESONIDE 1 MG/2ML
1000 INHALANT ORAL
Qty: 270 EACH | Refills: 1 | Status: SHIPPED | OUTPATIENT
Start: 2021-05-12

## 2021-05-12 RX ADMIN — IPRATROPIUM BROMIDE AND ALBUTEROL SULFATE 1 DOSE: .5; 2.5 SOLUTION RESPIRATORY (INHALATION) at 10:00

## 2021-05-12 RX ADMIN — IPRATROPIUM BROMIDE AND ALBUTEROL SULFATE 1 DOSE: .5; 2.5 SOLUTION RESPIRATORY (INHALATION) at 09:48

## 2021-05-12 RX ADMIN — IPRATROPIUM BROMIDE AND ALBUTEROL SULFATE 1 DOSE: .5; 2.5 SOLUTION RESPIRATORY (INHALATION) at 09:29

## 2021-05-12 NOTE — ED PROVIDER NOTES
47 y/o female with PMHx of DM and asthma, presenting with complaint of shortness of breath. The patient reports a sudden onset of wheezing, coughing and shortness of breath around 8:00 this morning. She states that she felt fine yesterday and when she first woke up this morning. She tried her albuterol inhaler twice at home without relief. EMS gave Decadron and 1 nebulizer treatment in route which did provide some relief. She states that her symptoms are consistent with previous asthma exacerbations. She has been hospitalized for asthma before, never in the ICU, has never been intubated for asthma. She denies fevers, nasal congestion, nausea, vomiting, diarrhea. The history is provided by the patient and the EMS personnel.         Past Medical History:   Diagnosis Date    Asthma     Diabetes ([facility]     Diabetes mellitus type 2 in obese -- Diet controlled 10/28/2014    Morbid obesity ([facility] 10/28/2014       Past Surgical History:   Procedure Laterality Date    HX GYN      3 C-sections    HX GYN      Miscarriage         Family History:   Problem Relation Age of Onset    Hypertension Maternal Aunt     Hypertension Maternal Uncle     Thyroid Disease Maternal Uncle     Hypertension Maternal Grandmother     Cancer Maternal Grandfather     Hypertension Maternal Grandfather     Diabetes Paternal Grandmother     Cancer Paternal Grandmother     Hypertension Mother     Alcohol abuse Father     Substance Abuse Father     Hypertension Maternal Aunt     Hypertension Maternal Uncle     Stroke Maternal Uncle     Hypertension Maternal Uncle     Alcohol abuse Maternal Uncle     Hypertension Maternal Uncle     Hypertension Maternal Uncle     Arrhythmia Maternal Uncle        Social History     Socioeconomic History    Marital status: SINGLE     Spouse name: Not on file    Number of children: Not on file    Years of education: Not on file    Highest education level: Not on file   Occupational History  Not on file   Social Needs    Financial resource strain: Not on file    Food insecurity     Worry: Not on file     Inability: Not on file    Transportation needs     Medical: Not on file     Non-medical: Not on file   Tobacco Use    Smoking status: Former Smoker     Packs/day: 0.10     Types: Cigarettes     Start date: 2014     Quit date: 2016     Years since quittin.3    Smokeless tobacco: Never Used   Substance and Sexual Activity    Alcohol use: Yes     Alcohol/week: 0.0 standard drinks     Comment: last  drink was 1year ago    Drug use: No    Sexual activity: Yes     Partners: Male   Lifestyle    Physical activity     Days per week: Not on file     Minutes per session: Not on file    Stress: Not on file   Relationships    Social connections     Talks on phone: Not on file     Gets together: Not on file     Attends Yazidism service: Not on file     Active member of club or organization: Not on file     Attends meetings of clubs or organizations: Not on file     Relationship status: Not on file    Intimate partner violence     Fear of current or ex partner: Not on file     Emotionally abused: Not on file     Physically abused: Not on file     Forced sexual activity: Not on file   Other Topics Concern    Not on file   Social History Narrative    Not on file         ALLERGIES: Metformin and Pcn [penicillins]    Review of Systems   Constitutional: Negative for chills and fever. HENT: Negative for congestion. Respiratory: Positive for cough, shortness of breath and wheezing. Gastrointestinal: Negative for diarrhea, nausea and vomiting. Musculoskeletal: Negative for myalgias. Neurological: Negative for syncope. All other systems reviewed and are negative.       Vitals:    21 0920   Pulse: (!) 130   Resp: (!) 32   Temp: 98.7 °F (37.1 °C)   SpO2: 96%   Weight: (!) 159.7 kg (352 lb)   Height: 5' 5\" (1.651 m)            Physical Exam  Vitals signs and nursing note reviewed. Constitutional:       General: She is not in acute distress. Appearance: She is well-developed. She is not diaphoretic. HENT:      Head: Normocephalic and atraumatic. Eyes:      Conjunctiva/sclera: Conjunctivae normal.   Neck:      Musculoskeletal: Normal range of motion and neck supple. Cardiovascular:      Rate and Rhythm: Regular rhythm. Tachycardia present. Heart sounds: Normal heart sounds. Pulmonary:      Breath sounds: Wheezing and rales present. Comments: Increased work of breathing, tachypnea. Skin:     General: Skin is warm and dry. Neurological:      Mental Status: She is alert and oriented to person, place, and time. MDM  Number of Diagnoses or Management Options  Diagnosis management comments: Total critical care time spent exclusive of procedures:  33 minutes        Amount and/or Complexity of Data Reviewed  Discuss the patient with other providers: yes (Dr. Ventura Watson, ED attending)    Patient Progress  Patient progress: stable         Procedures  ED EKG interpretation:  Rhythm: sinus tachycardia; and regular . Rate (approx.): 115; Axis: normal; ST/T wave: normal.        45 y/o female with PMHx of DM and asthma, presenting with complaint of shortness of breath. History and exam consistent with asthma exacerbation. Patient is fully vaccinated and no recent exposure to covid. No fever, low clinical suspicion for pneumonia. Doubt CHF, pulmonary edema, MI, PE, tamponade or PTX. Progress Note - 9:47 AM   1st neb completed, wheezing somewhat improved but shortness of breath and cough ongoing. Will continue hour-long continuous nebs. Progress Note - 10:12 AM   Patient reassessed, wheezing unchanged from prior exam but patient reports subjectively feeling somewhat better. Progress Note - 10:45 AM  Breath sounds improved with minimal wheezing, normal work of breathing, patient states she feels well enough to go home.  Agree with discharge disposition. Patient discharged home with instructions for prompt pulmonology follow up. Strict ED return precautions discussed and provided in writing at time of discharge. The patient verbalized understanding and agreement with this plan.

## 2021-05-14 LAB
ATRIAL RATE: 115 BPM
CALCULATED P AXIS, ECG09: 50 DEGREES
CALCULATED R AXIS, ECG10: 36 DEGREES
CALCULATED T AXIS, ECG11: 40 DEGREES
DIAGNOSIS, 93000: NORMAL
P-R INTERVAL, ECG05: 140 MS
Q-T INTERVAL, ECG07: 360 MS
QRS DURATION, ECG06: 62 MS
QTC CALCULATION (BEZET), ECG08: 498 MS
VENTRICULAR RATE, ECG03: 115 BPM

## 2021-05-18 ENCOUNTER — VIRTUAL VISIT (OUTPATIENT)
Dept: FAMILY MEDICINE CLINIC | Age: 41
End: 2021-05-18
Payer: COMMERCIAL

## 2021-05-18 DIAGNOSIS — E66.9 DIABETES MELLITUS TYPE 2 IN OBESE (HCC): ICD-10-CM

## 2021-05-18 DIAGNOSIS — I10 ESSENTIAL HYPERTENSION: ICD-10-CM

## 2021-05-18 DIAGNOSIS — J45.51 SEVERE PERSISTENT ASTHMA WITH EXACERBATION: Primary | ICD-10-CM

## 2021-05-18 DIAGNOSIS — E11.69 DIABETES MELLITUS TYPE 2 IN OBESE (HCC): ICD-10-CM

## 2021-05-18 PROCEDURE — 99214 OFFICE O/P EST MOD 30 MIN: CPT | Performed by: FAMILY MEDICINE

## 2021-05-18 RX ORDER — PREDNISONE 20 MG/1
TABLET ORAL
Qty: 13 TAB | Refills: 0 | Status: SHIPPED | OUTPATIENT
Start: 2021-05-18 | End: 2021-06-28 | Stop reason: ALTCHOICE

## 2021-05-18 NOTE — PROGRESS NOTES
Chief Complaint   Patient presents with    Follow-up     ER Visit Asthma,Congestion and Cough   1. Have you been to the ER, urgent care clinic since your last visit? Hospitalized since your last visit? Yes Where: [address]    2. Have you seen or consulted any other health care providers outside of the [address] [place] [address] since your last visit? Include any pap smears or colon screening.  No    Seen @  5/14/21 Asthma  Needs note for work

## 2021-05-18 NOTE — PROGRESS NOTES
[facility] (: 1980) is a 46 y.o. female, established patient, here for evaluation of the following chief complaint(s):   Follow-up (ER Visit Asthma,Congestion and Cough)       ASSESSMENT/PLAN: Struggling with recent asthma exacerbation. Appears to be most consistent with having to miss Nucala injection after having Covid vaccination and weather change leading to asthma exacerbation. Currently on prednisone 40 mg but still seems to be struggling with dyspnea, coughing so we will send prescription for slightly higher dose prednisone taper starting at 60 mg daily for the next 2 days and then tapering down while awaiting pulm appt. Sugars significantly uncontrolled because of steroids. We will increase 70/30 insulin to 45 units before breakfast and 45 units before dinner and continue using Humalog 25 units before lunch and sliding scale. Will follow up in 2 days to ensure sugars are responding well and coming out of the 400-500 range. Below is the assessment and plan developed based on review of pertinent labs, studies, and medications. 1. Severe persistent asthma with exacerbation  -     predniSONE (DELTASONE) 20 mg tablet; Take 3 pills for 2 days, then 2 pills for 2 days, then 1 pill for 3 days, Normal, Disp-13 Tab, R-0  2. Essential hypertension  3. Diabetes mellitus type 2 in obese (HCC)  -     insulin nph-regular human rec (HUMULIN 70-30) 100 unit/mL (70-30) inpn; 45 units before breakfast and before dinner - dose updated on chart while on Prednisone, No Print, Disp-1 mL, R-0      Return in about 2 days (around 2021), or if symptoms worsen or fail to improve. SUBJECTIVE/OBJECTIVE:    Sx started after getting 1st COVID vaccine and having to miss next Nucala shot. Got the 2nd COVID vaccine on 21. Weather changed and set off     Since last appointment, has been seen in ER twice for asthma exacerbation.   During the first exacerbation on 2021 she went to [address] [place] [address] ER and had Decadron from EMS. Did well with nebs and was discharged home. During the second exacerbation, she was taken to [facility] ER and was given additional steroids. She has not followed up with pulmonology. 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 Sugars were doing fairly well especially when she has not been using steroids but has spiked recently with steroid use for asthma exacerbations. Using Prednisone 40 mg daily. Seeing sugars in 400-500. ROS  Gen - no fever/chills  Resp -Per HPI  CV - no chest pain or DURON  Rest per HPI    Patient-Reported Vitals 1/14/2021   Patient-Reported Pulse 117   Patient-Reported Temperature -   Patient-Reported Systolic  237   Patient-Reported Diastolic 90     Physical exam:  General appearance - alert, well appearing, and in no distress  Eyes -sclera anicteric, no discharge  HEENT- normocephalic, atraumatic, moist mucous membranes, no visualized neck mass  Chest - sig hoarseness and some dyspnea but no concern for resp distress  Neurological - alert, awake, normal speech, no focal findings or movement disorder noted  Psych - normal mood and affect  Skin- no apparent lesions      On this date 05/18/2021 I have spent 30 minutes reviewing previous notes, test results and face to face (virtual) with the patient discussing the diagnosis and importance of compliance with the treatment plan as well as documenting on the day of the visit. [facility], was evaluated through a synchronous (real-time) audio-video encounter. The patient (or guardian if applicable) is aware that this is a billable service. Verbal consent to proceed has been obtained within the past 12 months. The visit was conducted pursuant to the emergency declaration under the [address] [place] [address], [address] [place] [address] authority and the [employer] General Act.   Patient identification was verified, and a caregiver was present when appropriate. The patient was located in a state where the provider was credentialed to provide care. An electronic signature was used to authenticate this note.   -- Elfego Ibarra MD

## 2021-05-19 DIAGNOSIS — F43.21 GRIEF: ICD-10-CM

## 2021-05-19 DIAGNOSIS — I10 ESSENTIAL HYPERTENSION: ICD-10-CM

## 2021-05-19 RX ORDER — TRAZODONE HYDROCHLORIDE 50 MG/1
50 TABLET ORAL
Qty: 14 TABLET | Refills: 0 | Status: CANCELLED | OUTPATIENT
Start: 2021-05-19

## 2021-05-19 RX ORDER — LOSARTAN POTASSIUM 50 MG/1
50 TABLET ORAL DAILY
Qty: 14 TABLET | Refills: 0 | Status: CANCELLED | OUTPATIENT
Start: 2021-05-19

## 2021-06-08 RX ORDER — PANTOPRAZOLE SODIUM 40 MG/1
TABLET, DELAYED RELEASE ORAL
Qty: 30 TABLET | Refills: 0 | Status: SHIPPED | OUTPATIENT
Start: 2021-06-08 | End: 2021-07-20 | Stop reason: SDUPTHER

## 2021-06-28 ENCOUNTER — VIRTUAL VISIT (OUTPATIENT)
Dept: FAMILY MEDICINE CLINIC | Age: 41
End: 2021-06-28
Payer: COMMERCIAL

## 2021-06-28 DIAGNOSIS — E66.9 DIABETES MELLITUS TYPE 2 IN OBESE (HCC): ICD-10-CM

## 2021-06-28 DIAGNOSIS — E11.69 DIABETES MELLITUS TYPE 2 IN OBESE (HCC): ICD-10-CM

## 2021-06-28 DIAGNOSIS — J45.51 SEVERE PERSISTENT ASTHMA WITH EXACERBATION: Primary | ICD-10-CM

## 2021-06-28 PROCEDURE — 99213 OFFICE O/P EST LOW 20 MIN: CPT | Performed by: FAMILY MEDICINE

## 2021-06-28 RX ORDER — PREDNISONE 5 MG/1
5 TABLET ORAL DAILY
COMMUNITY
End: 2021-08-16 | Stop reason: ALTCHOICE

## 2021-06-28 RX ORDER — PREDNISONE 20 MG/1
TABLET ORAL
Qty: 21 TABLET | Refills: 0 | Status: SHIPPED | OUTPATIENT
Start: 2021-06-28 | End: 2021-08-16 | Stop reason: ALTCHOICE

## 2021-06-28 NOTE — PROGRESS NOTES
[facility] (: 1980) is a 46 y.o. female, established patient, here for evaluation of the following chief complaint(s):   Cough (and Wheezing)       ASSESSMENT/PLAN:  Using Prednisone taper to help pt avoid ER/Hospitalization. To f/u with Pulm. Encouraged avoiding use of both Nebulized steroid and ICS. Below is the assessment and plan developed based on review of pertinent labs, studies, and medications. 1. Severe persistent asthma with exacerbation  -     predniSONE (DELTASONE) 20 mg tablet; Take 2 pills for 7 days, then 1 pill for 7 days, Normal, Disp-21 Tablet, R-0  2. Diabetes mellitus type 2 in obese ([facility]      Return in about 4 weeks (around 2021), or if symptoms worsen or fail to improve. SUBJECTIVE/OBJECTIVE:  Having trouble with asthma flaring. Back on Nucala. Will get another in 2 weeks. Voice hoarseness, coughing, dyspnea. On spiriva, advair, and albuterol. Currently on Prednisone 20 mg but was on taper and was on Prednisone 10 mg daily for the last few days. Sugars improved back to normal with 70/30 x 65 units BID and 10 units of Humalog with lunch.     Patient-Reported Vitals 2021   Patient-Reported Pulse 117   Patient-Reported Temperature -   Patient-Reported Systolic  390   Patient-Reported Diastolic 90     Physical exam:  General appearance - alert, well appearing, and in no distress  Eyes -sclera anicteric, no discharge  HEENT- normocephalic, atraumatic, moist mucous membranes, no visualized neck mass  Chest -normal respiratory effort, no visualized signs of respiratory distress, coughing  Neurological - alert, awake, normal speech, no focal findings or movement disorder noted  Psych - normal mood and affect  Skin- no apparent lesions    On this date 2021 I have spent 20 minutes reviewing previous notes, test results and face to face (virtual) with the patient discussing the diagnosis and importance of compliance with the treatment plan as well as documenting on the day of the visit. [facility], was evaluated through a synchronous (real-time) audio-video encounter. The patient (or guardian if applicable) is aware that this is a billable service. Verbal consent to proceed has been obtained within the past 12 months. The visit was conducted pursuant to the emergency declaration under the [address] [place] [address], [address] [place] [address] and the [employer] General Act. Patient identification was verified, and a caregiver was present when appropriate. The patient was located in a state where the provider was credentialed to provide care. An electronic signature was used to authenticate this note.   -- Pierre Lyles MD

## 2021-06-30 RX ORDER — DOXYCYCLINE 100 MG/1
100 CAPSULE ORAL 2 TIMES DAILY
Qty: 20 CAPSULE | Refills: 0 | OUTPATIENT
Start: 2021-06-30 | End: 2021-07-10

## 2021-07-20 DIAGNOSIS — J45.50 SEVERE PERSISTENT ASTHMA WITHOUT COMPLICATION: ICD-10-CM

## 2021-07-20 DIAGNOSIS — I10 ESSENTIAL HYPERTENSION: ICD-10-CM

## 2021-07-20 RX ORDER — PANTOPRAZOLE SODIUM 40 MG/1
40 TABLET, DELAYED RELEASE ORAL DAILY
Qty: 90 TABLET | Refills: 0 | Status: SHIPPED | OUTPATIENT
Start: 2021-07-20 | End: 2021-08-16 | Stop reason: SDUPTHER

## 2021-07-20 RX ORDER — MONTELUKAST SODIUM 10 MG/1
10 TABLET ORAL DAILY
Qty: 90 TABLET | Refills: 0 | Status: SHIPPED | OUTPATIENT
Start: 2021-07-20 | End: 2021-08-16 | Stop reason: SDUPTHER

## 2021-07-20 RX ORDER — LOSARTAN POTASSIUM 50 MG/1
50 TABLET ORAL DAILY
Qty: 90 TABLET | Refills: 0 | Status: SHIPPED | OUTPATIENT
Start: 2021-07-20 | End: 2021-08-16 | Stop reason: SDUPTHER

## 2021-07-22 ENCOUNTER — PATIENT MESSAGE (OUTPATIENT)
Dept: FAMILY MEDICINE CLINIC | Age: 41
End: 2021-07-22

## 2021-08-13 DIAGNOSIS — L02.91 ABSCESS: Primary | ICD-10-CM

## 2021-08-13 RX ORDER — SULFAMETHOXAZOLE AND TRIMETHOPRIM 800; 160 MG/1; MG/1
1 TABLET ORAL 2 TIMES DAILY
Qty: 14 TABLET | Refills: 0 | Status: SHIPPED | OUTPATIENT
Start: 2021-08-13 | End: 2021-08-20 | Stop reason: SDUPTHER

## 2021-08-16 ENCOUNTER — VIRTUAL VISIT (OUTPATIENT)
Dept: FAMILY MEDICINE CLINIC | Age: 41
End: 2021-08-16
Payer: COMMERCIAL

## 2021-08-16 DIAGNOSIS — E11.69 DIABETES MELLITUS TYPE 2 IN OBESE (HCC): ICD-10-CM

## 2021-08-16 DIAGNOSIS — K21.9 GASTROESOPHAGEAL REFLUX DISEASE, UNSPECIFIED WHETHER ESOPHAGITIS PRESENT: ICD-10-CM

## 2021-08-16 DIAGNOSIS — I10 ESSENTIAL HYPERTENSION: ICD-10-CM

## 2021-08-16 DIAGNOSIS — J45.50 SEVERE PERSISTENT ASTHMA WITHOUT COMPLICATION: ICD-10-CM

## 2021-08-16 DIAGNOSIS — E66.9 DIABETES MELLITUS TYPE 2 IN OBESE (HCC): ICD-10-CM

## 2021-08-16 DIAGNOSIS — L02.91 ABSCESS: Primary | ICD-10-CM

## 2021-08-16 PROCEDURE — 99213 OFFICE O/P EST LOW 20 MIN: CPT | Performed by: FAMILY MEDICINE

## 2021-08-16 RX ORDER — ALBUTEROL SULFATE 90 UG/1
2 AEROSOL, METERED RESPIRATORY (INHALATION)
Qty: 3 INHALER | Refills: 1 | Status: SHIPPED | OUTPATIENT
Start: 2021-08-16 | End: 2021-12-07 | Stop reason: SDUPTHER

## 2021-08-16 RX ORDER — MONTELUKAST SODIUM 10 MG/1
10 TABLET ORAL DAILY
Qty: 90 TABLET | Refills: 2 | Status: SHIPPED | OUTPATIENT
Start: 2021-08-16 | End: 2021-12-07 | Stop reason: SDUPTHER

## 2021-08-16 RX ORDER — AMLODIPINE BESYLATE 5 MG/1
5 TABLET ORAL DAILY
Qty: 90 TABLET | Refills: 2 | Status: SHIPPED | OUTPATIENT
Start: 2021-08-16 | End: 2021-12-07 | Stop reason: SDUPTHER

## 2021-08-16 RX ORDER — LOSARTAN POTASSIUM 50 MG/1
50 TABLET ORAL DAILY
Qty: 90 TABLET | Refills: 3 | Status: SHIPPED | OUTPATIENT
Start: 2021-08-16 | End: 2022-06-18

## 2021-08-16 RX ORDER — PANTOPRAZOLE SODIUM 40 MG/1
40 TABLET, DELAYED RELEASE ORAL DAILY
Qty: 90 TABLET | Refills: 2 | Status: SHIPPED | OUTPATIENT
Start: 2021-08-16 | End: 2021-12-07 | Stop reason: SDUPTHER

## 2021-08-16 RX ORDER — INSULIN LISPRO 100 [IU]/ML
INJECTION, SOLUTION INTRAVENOUS; SUBCUTANEOUS
Qty: 10 ML | Refills: 2 | Status: SHIPPED | OUTPATIENT
Start: 2021-08-16 | End: 2021-12-07 | Stop reason: SDUPTHER

## 2021-08-16 RX ORDER — PREDNISONE 10 MG/1
10 TABLET ORAL
COMMUNITY
Start: 2021-07-14 | End: 2021-08-26 | Stop reason: SDUPTHER

## 2021-08-16 RX ORDER — FLUTICASONE PROPIONATE AND SALMETEROL 500; 50 UG/1; UG/1
1 POWDER RESPIRATORY (INHALATION) 2 TIMES DAILY
Qty: 3 INHALER | Refills: 1 | Status: SHIPPED | OUTPATIENT
Start: 2021-08-16 | End: 2022-06-21 | Stop reason: ALTCHOICE

## 2021-08-16 NOTE — PROGRESS NOTES
Chief Complaint   Patient presents with    Diabetes     and Right thigh Wound   1. Have you been to the ER, urgent care clinic since your last visit? Hospitalized since your last visit? No    2. Have you seen or consulted any other health care providers outside of the [address] [place] [address] since your last visit? Include any pap smears or colon screening.  Yes Where: Pulmonary,Opthamology     Redness improved ,sending picture  through [employer]

## 2021-08-16 NOTE — PROGRESS NOTES
[facility] (: 1980) is a 46 y.o. female, established patient, here for evaluation of the following chief complaint(s):   Diabetes (and Right thigh Wound)       ASSESSMENT/PLAN: Refilling meds today. Continue Bactrim for abscess and recommended warm compress. Bringing into the office in 3 days to better evaluate and consider I&D versus general surgery eval if indicated. Patient planning to have labs drawn when she comes to the office. Asthma doing better overall. Pt reports improved DM control except for recently due to abscess while on prednisone. Below is the assessment and plan developed based on review of pertinent labs, studies, and medications. 1. Abscess  2. Essential hypertension  -     losartan (COZAAR) 50 mg tablet; Take 1 Tablet by mouth daily. Increased dose, Normal, Disp-90 Tablet, R-3  -     amLODIPine (NORVASC) 5 mg tablet; Take 1 Tablet by mouth daily. Take 1 Tab by mouth daily. , Normal, Disp-90 Tablet, R-2  3. Diabetes mellitus type 2 in obese (HCC)  -     insulin nph-regular human rec (HUMULIN 70-30) 100 unit/mL (70-30) inpn; 65 units before breakfast and before dinner - dose updated on chart while on Prednisone, Normal, Disp-50 mL, R-2  -     HumaLOG U-100 Insulin 100 unit/mL injection; Take 25 units before lunch and sliding scale at bedtime as directed, Normal, Disp-10 mL, R-2, PRERNA  4. Severe persistent asthma without complication  -     montelukast (SINGULAIR) 10 mg tablet; Take 1 Tablet by mouth daily. , Normal, Disp-90 Tablet, R-2  -     fluticasone propion-salmeteroL (Advair Diskus) 500-50 mcg/dose diskus inhaler; Take 1 Puff by inhalation two (2) times a day., Normal, Disp-3 Inhaler, R-1  -     albuterol (PROVENTIL HFA, VENTOLIN HFA, PROAIR HFA) 90 mcg/actuation inhaler; Take 2 Puffs by inhalation every four (4) hours as needed for Wheezing., Normal, Disp-3 Inhaler, R-1  5.  Gastroesophageal reflux disease, unspecified whether esophagitis present  -     pantoprazole (PROTONIX) 40 mg tablet; Take 1 Tablet by mouth daily. , Normal, Disp-90 Tablet, R-2      Return in about 3 days (around 8/19/2021). SUBJECTIVE/OBJECTIVE:    Asthma doing well overall with Nucala, low-dose daily prednisone, and inhalers. Voice hoarseness recently abated. Abscess - some drainage and seems to be lessening. Started on Bactrim 2 days. Reports her abscesses about the size of a small dinner plate but has been getting smaller. Using heating pad. DM - sugars are up and down with steroids and abscess. Sugars have been mostly well controlled over the past 3 months with some ups and downs. No recent A1c. Patient-Reported Vitals 1/14/2021   Patient-Reported Pulse 117   Patient-Reported Temperature -   Patient-Reported Systolic  552   Patient-Reported Diastolic 90       Physical exam:  General appearance - alert, well appearing, and in no distress  Eyes -sclera anicteric, no discharge  HEENT- normocephalic, atraumatic, moist mucous membranes, no visualized neck mass  Chest -normal respiratory effort, no visualized signs of respiratory distress  Neurological - alert, awake, normal speech, no focal findings or movement disorder noted  Psych - normal mood and affect  Skin-abscess noted on left inner thigh    On this date 08/16/2021 I have spent 20 minutes reviewing previous notes, test results and face to face (virtual) with the patient discussing the diagnosis and importance of compliance with the treatment plan as well as documenting on the day of the visit. [facility], was evaluated through a synchronous (real-time) audio-video encounter. The patient (or guardian if applicable) is aware that this is a billable service. Verbal consent to proceed has been obtained within the past 12 months.  The visit was conducted pursuant to the emergency declaration under the [address], [address] waiver authority and the [employer] Appropriations Act. Patient identification was verified, and a caregiver was present when appropriate. The patient was located in a state where the provider was credentialed to provide care. An electronic signature was used to authenticate this note.   -- Iliana Louise MD

## 2021-08-20 DIAGNOSIS — L02.91 ABSCESS: ICD-10-CM

## 2021-08-20 RX ORDER — SULFAMETHOXAZOLE AND TRIMETHOPRIM 800; 160 MG/1; MG/1
1 TABLET ORAL 2 TIMES DAILY
Qty: 14 TABLET | Refills: 0 | OUTPATIENT
Start: 2021-08-20 | End: 2021-08-27

## 2021-08-27 RX ORDER — PREDNISONE 10 MG/1
TABLET ORAL
Qty: 30 TABLET | Refills: 0 | OUTPATIENT
Start: 2021-08-27 | End: 2021-09-20 | Stop reason: SDUPTHER

## 2021-08-31 ENCOUNTER — PATIENT MESSAGE (OUTPATIENT)
Dept: FAMILY MEDICINE CLINIC | Age: 41
End: 2021-08-31

## 2021-09-13 ENCOUNTER — VIRTUAL VISIT (OUTPATIENT)
Dept: FAMILY MEDICINE CLINIC | Age: 41
End: 2021-09-13
Payer: COMMERCIAL

## 2021-09-13 DIAGNOSIS — H18.20 CORNEAL EDEMA OF RIGHT EYE: Primary | ICD-10-CM

## 2021-09-13 DIAGNOSIS — H18.621 ACUTE HYDROPS KERATOCONUS OF RIGHT EYE: ICD-10-CM

## 2021-09-13 DIAGNOSIS — E11.69 DIABETES MELLITUS TYPE 2 IN OBESE (HCC): ICD-10-CM

## 2021-09-13 DIAGNOSIS — J82.83 EOSINOPHILIC ASTHMA: ICD-10-CM

## 2021-09-13 DIAGNOSIS — E66.9 DIABETES MELLITUS TYPE 2 IN OBESE (HCC): ICD-10-CM

## 2021-09-13 DIAGNOSIS — E66.01 MORBID OBESITY WITH BMI OF 50.0-59.9, ADULT (HCC): ICD-10-CM

## 2021-09-13 PROCEDURE — 99213 OFFICE O/P EST LOW 20 MIN: CPT | Performed by: FAMILY MEDICINE

## 2021-09-13 RX ORDER — ACETAMINOPHEN AND CODEINE PHOSPHATE 300; 30 MG/1; MG/1
1 TABLET ORAL
Qty: 28 TABLET | Refills: 0 | Status: SHIPPED | OUTPATIENT
Start: 2021-09-13 | End: 2022-02-01 | Stop reason: SDUPTHER

## 2021-09-13 NOTE — PROGRESS NOTES
Ruth Reeves (: 1980) is a 46 y.o. female, established patient, here for evaluation of the following chief complaint(s):   Eye Pain (Right)       ASSESSMENT/PLAN: Stable overall. Sig pain in bilat eyes despite current tx. Will use Tylenol #3 sparingly for severe pain and should hopefully improve with tx. To get labs drawn soon but sugars have been doing well. Asthma controlled. Needs to work on weight. Below is the assessment and plan developed based on review of pertinent history, labs, studies, and medications. 1. Corneal edema of right eye  -     acetaminophen-codeine (Tylenol-Codeine #3) 300-30 mg per tablet; Take 1 Tablet by mouth every six (6) hours as needed for Pain for up to 7 days. Max Daily Amount: 4 Tablets., Normal, Disp-28 Tablet, R-0  2. Acute hydrops keratoconus of right eye  -     acetaminophen-codeine (Tylenol-Codeine #3) 300-30 mg per tablet; Take 1 Tablet by mouth every six (6) hours as needed for Pain for up to 7 days. Max Daily Amount: 4 Tablets., Normal, Disp-28 Tablet, R-0  3. Diabetes mellitus type 2 in obese ([address]  4. Eosinophilic asthma  5. Morbid obesity with BMI of 50.0-59.9, adult ([facility]      Return in about 3 months (around 2021). SUBJECTIVE/OBJECTIVE:    Working with Dr. Venkatesh Bishop for corneal edema in right eye. Sent to [facility] for surgery d/t her weight (safety with anesthesia). She is having sig pain related to this. Using anne drops 6x per day and abx. She is in sig pain today. Using advil and APAP. Sugars have been improving. Mostly seeing  fastings and < 200 throughout the day. Asthma has been doing better.     ROS per HPI    No data recorded     Physical exam:  General appearance - alert, well appearing, and in no distress  Eyes -sclera anicteric, no discharge  HEENT- normocephalic, atraumatic, moist mucous membranes, no visualized neck mass  Chest -normal respiratory effort, no visualized signs of respiratory distress  Neurological - alert, awake, normal speech, no focal findings or movement disorder noted  Psych - normal mood and affect  Skin- no apparent lesions    On this date 09/13/2021 I have spent 20 minutes reviewing previous notes, test results and face to face (virtual) with the patient discussing the diagnosis and importance of compliance with the treatment plan as well as documenting on the day of the visit. [facility], was evaluated through a synchronous (real-time) audio-video encounter. The patient (or guardian if applicable) is aware that this is a billable service. Verbal consent to proceed has been obtained within the past 12 months. The visit was conducted pursuant to the emergency declaration under the [facility], [address] [place] [address] authority and the [employer] and [employer] General Act. Patient identification was verified, and a caregiver was present when appropriate. The patient was located in a state where the provider was credentialed to provide care. An electronic signature was used to authenticate this note.   -- Sharad Aguilar MD

## 2021-09-13 NOTE — PROGRESS NOTES
Chief Complaint   Patient presents with    Eye Pain     Right   1. Have you been to the ER, urgent care clinic since your last visit? Hospitalized since your last visit? Yes [facility]     2. Have you seen or consulted any other health care providers outside of the [address] [place] [address] since your last visit? Include any pap smears or colon screening.  Yes Where: Pulmonary and Ophthalmology    Seen today by Dr Hameed All given antibiotic and has scheduled appointment with [facility]  Ophthalmology Dr Mirta Lomax , appointment scheduled for 10/6/21

## 2021-09-20 ENCOUNTER — VIRTUAL VISIT (OUTPATIENT)
Dept: FAMILY MEDICINE CLINIC | Age: 41
End: 2021-09-20
Payer: COMMERCIAL

## 2021-09-20 DIAGNOSIS — H18.621 ACUTE HYDROPS KERATOCONUS OF RIGHT EYE: ICD-10-CM

## 2021-09-20 DIAGNOSIS — H18.20 CORNEAL EDEMA OF RIGHT EYE: Primary | ICD-10-CM

## 2021-09-20 DIAGNOSIS — J45.51 SEVERE PERSISTENT ASTHMA WITH EXACERBATION: ICD-10-CM

## 2021-09-20 DIAGNOSIS — J82.83 EOSINOPHILIC ASTHMA: ICD-10-CM

## 2021-09-20 PROCEDURE — 99213 OFFICE O/P EST LOW 20 MIN: CPT | Performed by: FAMILY MEDICINE

## 2021-09-20 RX ORDER — ERYTHROMYCIN 5 MG/G
OINTMENT OPHTHALMIC
COMMUNITY
Start: 2021-09-13 | End: 2021-12-07 | Stop reason: ALTCHOICE

## 2021-09-20 RX ORDER — PREDNISONE 10 MG/1
TABLET ORAL
Qty: 90 TABLET | Refills: 0 | Status: SHIPPED | OUTPATIENT
Start: 2021-09-20 | End: 2022-02-01 | Stop reason: ALTCHOICE

## 2021-09-20 NOTE — PROGRESS NOTES
[facility] (: 1980) is a 46 y.o. female, established patient, here for evaluation of the following chief complaint(s):   Form Completion (FMLA)       ASSESSMENT/PLAN: Stable overall. Encouraged her to get her next Nucala injection and continue working with specialists. Below is the assessment and plan developed based on review of pertinent history, labs, studies, and medications. 1. Corneal edema of right eye  2. Acute hydrops keratoconus of right eye  3. Eosinophilic asthma  4. Severe persistent asthma with exacerbation      Return in about 6 weeks (around 2021), or if symptoms worsen or fail to improve. SUBJECTIVE/OBJECTIVE:  Needs forms for FMLA done today. Doing ok today. Breathing stable. Missed Nucala injection recently d/t transportation issues that day. She has follow-up with pulmonary soon. She is also continuing to deal with hydrops with corneal edema. She is planning to have surgery on this soon and is using Kenzie drops still. Continues to have days with severe pressure and pain and feels like she has continued to deal with this for almost a year now. ROS per HPI      Patient-Reported Systolic (Top): 311  Patient-Reported Diastolic (Bottom): 87  Patient-Reported Pulse: 88       Physical exam:  General appearance - alert, well appearing, and in no distress  Eyes -sclera anicteric, no discharge  HEENT- normocephalic, atraumatic, moist mucous membranes, no visualized neck mass  Chest -normal respiratory effort, no visualized signs of respiratory distress  Neurological - alert, awake, normal speech, no focal findings or movement disorder noted  Psych - normal mood and affect  Skin- no apparent lesions    On this date 2021 I have spent 20 minutes reviewing previous notes, test results and face to face (virtual) with the patient discussing the diagnosis and importance of compliance with the treatment plan as well as documenting on the day of the visit.     Ruth [facility], was evaluated through a synchronous (real-time) audio-video encounter. The patient (or guardian if applicable) is aware that this is a billable service. Verbal consent to proceed has been obtained within the past 12 months. The visit was conducted pursuant to the emergency declaration under the [address] [place] [address], [address] [place] [address] and the [employer] General Act. Patient identification was verified, and a caregiver was present when appropriate. The patient was located in a state where the provider was credentialed to provide care. An electronic signature was used to authenticate this note.   -- Lacie Farrar MD

## 2021-10-04 ENCOUNTER — VIRTUAL VISIT (OUTPATIENT)
Dept: FAMILY MEDICINE CLINIC | Age: 41
End: 2021-10-04
Payer: COMMERCIAL

## 2021-10-04 DIAGNOSIS — G45.9 TIA (TRANSIENT ISCHEMIC ATTACK): ICD-10-CM

## 2021-10-04 DIAGNOSIS — E11.69 DIABETES MELLITUS TYPE 2 IN OBESE (HCC): ICD-10-CM

## 2021-10-04 DIAGNOSIS — J45.50 SEVERE PERSISTENT ASTHMA WITHOUT COMPLICATION: ICD-10-CM

## 2021-10-04 DIAGNOSIS — Z09 HOSPITAL DISCHARGE FOLLOW-UP: Primary | ICD-10-CM

## 2021-10-04 DIAGNOSIS — E66.9 DIABETES MELLITUS TYPE 2 IN OBESE (HCC): ICD-10-CM

## 2021-10-04 PROCEDURE — 1111F DSCHRG MED/CURRENT MED MERGE: CPT | Performed by: FAMILY MEDICINE

## 2021-10-04 PROCEDURE — 99214 OFFICE O/P EST MOD 30 MIN: CPT | Performed by: FAMILY MEDICINE

## 2021-10-04 RX ORDER — ATORVASTATIN CALCIUM 20 MG/1
TABLET, FILM COATED ORAL
COMMUNITY
Start: 2021-09-28 | End: 2021-12-07 | Stop reason: SDUPTHER

## 2021-10-04 NOTE — PROGRESS NOTES
Transitional Care Management Progress Note    Patient: Ileen Lefort  : 1980  PCP: Marlena Harvey MD    Date of admission:   Date of discharge:     Patient was contacted by Transitional Care Management services within two days after her discharge: Yes. This encounter and supporting documentation was reviewed if available. Medication reconciliation was performed today (10/4/2021). Assessment/Plan: Improving overall. Given recent TIA, would like her to have home health physical therapy. Sugars uncontrolled especially with steroid use and episodic steroid adjustments related to her asthma so sending to Endo as well. She had been well controlled up until last year when she began having severe issues with her asthma and had to be on numerous steroid regimens. Labs had been pending for patient for a while but she has not been able to get to the lab to have her blood work done but A1c in hospital was 13%     Diagnoses and all orders for this visit:    1. Hospital discharge follow-up  -     SD DISCHARGE MEDS RECONCILED W/ CURRENT OUTPATIENT MED LIST  -     REFERRAL TO [facility]     2. TIA (transient ischemic attack)  -     [facility]    3. Diabetes mellitus type 2 in obese ([address]  -     REFERRAL TO ENDOCRINOLOGY  -     [facility]    4. Severe persistent asthma without complication      The complexity of medical decision making for this patient's transitional care is moderate   Follow-up and Dispositions    · Return in 1 month (on 2021), or if symptoms worsen or fail to improve. Subjective:   Ileen Lefort is a 46 y.o. female presenting today for follow-up after being discharged from [facility].  The discharge summary was reviewed or requested. The main problem requiring admission was TIA and hyperglycemia.    Complications during admission: none    She was seen for left facial droop and left sided weakness and thought to have another LEVAR.  Feels like her symptoms have largely improved    Was having very high sugars - 500-600s. She was taking 70/30 insulin times 75 u bid + humalog 10 units with lunch. She was starting to see sugars come under the 200 range. With her asthma flaring she ended up going up on her prednisone from 10 mg daily up to 40 mg daily for 3 days at her sugars significantly jumped at that time. She ended up on 70/30 insulin 80 units twice daily plus the same Humalog dose and sugars dropped significantly. Admitting symptoms have: significantly improved    Medications marked \"taking\" at this time:  Home Medications    Medication Sig Start Date End Date Taking? Authorizing Provider   atorvastatin (LIPITOR) 20 mg tablet  9/28/21  Yes Provider, Historical   erythromycin (ILOTYCIN) ophthalmic ointment  9/13/21  Yes Provider, Historical   predniSONE (DELTASONE) 10 mg tablet As directed 9/20/21  Yes Casi Barrow MD   losartan (COZAAR) 50 mg tablet Take 1 Tablet by mouth daily. Increased dose 8/16/21  Yes Casi Barrow MD   insulin nph-regular human rec (HUMULIN 70-30) 100 unit/mL (70-30) inpn 65 units before breakfast and before dinner - dose updated on chart while on Prednisone 8/16/21  Yes Casi Barrow MD   montelukast (SINGULAIR) 10 mg tablet Take 1 Tablet by mouth daily. 8/16/21  Yes Casi Barrow MD   pantoprazole (PROTONIX) 40 mg tablet Take 1 Tablet by mouth daily. 8/16/21  Yes Casi Barrow MD   HumaLOG U-100 Insulin 100 unit/mL injection Take 25 units before lunch and sliding scale at bedtime as directed 8/16/21  Yes Casi Barrow MD   fluticasone propion-salmeteroL (Advair Diskus) 500-50 mcg/dose diskus inhaler Take 1 Puff by inhalation two (2) times a day. 8/16/21  Yes Casi Barrow MD   amLODIPine (NORVASC) 5 mg tablet Take 1 Tablet by mouth daily. Take 1 Tab by mouth daily.  8/16/21  Yes Casi Barrow MD   albuterol (PROVENTIL HFA, VENTOLIN HFA, PROAIR HFA) 90 mcg/actuation inhaler Take 2 Puffs by inhalation every four (4) hours as needed for Wheezing. 8/16/21  Yes Christina Zamorano MD   tiotropium bromide (SPIRIVA RESPIMAT) 2.5 mcg/actuation inhaler Take 2 Puffs by inhalation daily. 5/12/21  Yes Christina Zamorano MD   budesonide (PULMICORT) 1 mg/2 mL nbsp 2 mL by Nebulization route two (2) times daily as needed for Cough. Do not use on days when using Advair 5/12/21  Yes Christina Zamorano MD   magnesium oxide (MAG-OX) 400 mg tablet Take 1 Tab by mouth daily. 5/12/21  Yes Christina Zamorano MD   dulaglutide (Trulicity) 1.63 QH/7.3 mL sub-q pen 0.5 mL by SubCUTAneous route every seven (7) days. 5/6/21  Yes Christina Zamorano MD   zinc sulfate (ZINC-220 PO) Take  by mouth daily. Yes Provider, Historical   sodium chloride (PEDRO-5) 5 % ophthalmic solution  1/21/21  Yes Provider, Historical   cetirizine (ZYRTEC) 10 mg tablet Take 10 mg by mouth daily as needed for Allergies. Yes Provider, Historical   TRUEplus Insulin 1 mL 31 gauge x 5/16 syrg Use with insulin 12/17/20  Yes Christina Zamorano MD   aspirin delayed-release 81 mg tablet Take 81 mg by mouth daily. Yes Provider, Historical   albuterol (PROVENTIL VENTOLIN) 2.5 mg /3 mL (0.083 %) nebu  10/21/20  Yes Provider, Historical   ascorbic acid, vitamin C, (Vitamin C) 500 mg tablet Take 500 mg by mouth daily. Yes Provider, Historical   cranberry extract 450 mg tab tablet Take 450 mg by mouth daily. Yes Provider, Historical   hydroquinone (ESOTERICA, MELQUIN) 4 % topical cream Apply  to affected area two (2) times a day.   Patient not taking: Reported on 10/4/2021 3/3/20   Christina Zamorano MD      ROS  Gen - no fever/chills  Resp - no dyspnea or cough  CV - no chest pain or DURON  Rest per HPI    Patient Active Problem List   Diagnosis Code    Morbid obesity ([facility] E66.01    Diabetes mellitus type 2 in obese -- Diet controlled E11.69, E66.9    TIA (transient ischemic attack) G45.9    Hypertension I10    Ex-smoker Z87.891    Blurred vision, left eye H53.8    Weakness of left side of body R53.1    Transient ischemic attack involving right internal carotid artery G45.1     Past Medical History:   Diagnosis Date    Asthma     Diabetes ([facility]     Diabetes mellitus type 2 in obese -- Diet controlled 10/28/2014    Morbid obesity ([facility] 10/28/2014        Objective:     Patient-Reported Vitals 9/20/2021   Patient-Reported Pulse 88   Patient-Reported Temperature -   Patient-Reported Systolic  748   Patient-Reported Diastolic 87      General: alert, cooperative, no distress   Mental  status: normal mood, behavior, speech, dress, motor activity, and thought processes, able to follow commands   HENT: NCAT, decreased vision   Neck: no visualized mass   Resp: no respiratory distress   Neuro: no gross deficits   Skin: no discoloration or lesions of concern on visible areas   Psychiatric: normal affect, consistent with stated mood, no evidence of hallucinations     Additional exam findings: We discussed the expected course, resolution and complications of the diagnosis(es) in detail. Medication risks, benefits, costs, interactions, and alternatives were discussed as indicated. I advised her to contact the office if her condition worsens, changes or fails to improve as anticipated. She expressed understanding with the diagnosis(es) and plan. [facility], who was evaluated through a synchronous (real-time) audio-video encounter and/or her healthcare decision maker, is aware that it is a billable service, with coverage as determined by her insurance carrier. She provided verbal consent to proceed: Yes, and patient identification was verified. It was conducted pursuant to the emergency declaration under the [address] [place] [address] authority and the [employer] General Act. A caregiver was present when appropriate.  Ability to conduct physical exam was limited. I was at home. The patient was at home.       Oly Kern MD

## 2021-10-04 NOTE — PROGRESS NOTES
Chief Complaint   Patient presents with   [facility] Follow Up     Admitted 9/26/21 Discharged 9/28/21 [facility]   1. Have you been to the ER, urgent care clinic since your last visit? Hospitalized since your last visit? Yes Where: [facility]    2. Have you seen or consulted any other health care providers outside of the [address] [place] [address] since your last visit? Include any pap smears or colon screening.  No

## 2021-10-26 ENCOUNTER — VIRTUAL VISIT (OUTPATIENT)
Dept: FAMILY MEDICINE CLINIC | Age: 41
End: 2021-10-26
Payer: COMMERCIAL

## 2021-10-26 DIAGNOSIS — Z86.73 HISTORY OF TIA (TRANSIENT ISCHEMIC ATTACK): ICD-10-CM

## 2021-10-26 DIAGNOSIS — J45.50 SEVERE PERSISTENT ASTHMA WITHOUT COMPLICATION: ICD-10-CM

## 2021-10-26 DIAGNOSIS — H18.621 ACUTE HYDROPS KERATOCONUS OF RIGHT EYE: ICD-10-CM

## 2021-10-26 DIAGNOSIS — E66.9 DIABETES MELLITUS TYPE 2 IN OBESE (HCC): Primary | ICD-10-CM

## 2021-10-26 DIAGNOSIS — E11.69 DIABETES MELLITUS TYPE 2 IN OBESE (HCC): Primary | ICD-10-CM

## 2021-10-26 PROCEDURE — 99213 OFFICE O/P EST LOW 20 MIN: CPT | Performed by: FAMILY MEDICINE

## 2021-10-26 NOTE — PROGRESS NOTES
Chief Complaint   Patient presents with    Follow-up     Diabetes and Form   1. Have you been to the ER, urgent care clinic since your last visit? Hospitalized since your last visit? No    2. Have you seen or consulted any other health care providers outside of the [address] [place] [address] since your last visit? Include any pap smears or colon screening. Yes Where:  Form      BS 99   at lunch    Discuss Diclofenac for eye pain

## 2021-10-26 NOTE — PROGRESS NOTES
[facility] (: 1980) is a 46 y.o. female, established patient, here for evaluation of the following chief complaint(s):   Follow-up (Diabetes and Form)     ASSESSMENT/PLAN: Stable over all. Encouraged working on diet and exercise more aggressively and adjusting insulin with sliding scale until able to see Endo. Forms filled out for patient today  Below is the assessment and plan developed based on review of pertinent history, labs, studies, and medications. 1. Diabetes mellitus type 2 in obese ([address]  2. Severe persistent asthma without complication  3. Acute hydrops keratoconus of right eye  4. History of TIA (transient ischemic attack)      Return in about 3 months (around 2022), or if symptoms worsen or fail to improve. SUBJECTIVE/OBJECTIVE:    Since last appointment, seen by pulmonology. Trying to switch from Nucala to [address] [place] [address]. Using Prednisone episodically. Starting to do more exercise. Changing diet. Still waiting appointment with Endo. Sugars are running a little better. Today fasting sugar was 99. Pre lunch was 260s - took 15 units of R. Sugars last week were better but took prednisone yesterday.     Still working with ophthalmology on keratoconus and no plans for surgery at this time    No data recorded     Physical exam:  General appearance - alert, well appearing, and in no distress  Eyes -sclera anicteric, no discharge  HEENT- normocephalic, atraumatic, moist mucous membranes, no visualized neck mass  Chest -normal respiratory effort, no visualized signs of respiratory distress  Neurological - alert, awake, normal speech, no focal findings or movement disorder noted  Psych - normal mood and affect  Skin- no apparent lesions    On this date 10/26/2021 I have spent 20 minutes reviewing previous notes, test results and face to face (virtual) with the patient discussing the diagnosis and importance of compliance with the treatment plan as well as documenting on the day of the visit. [facility], was evaluated through a synchronous (real-time) audio-video encounter. The patient (or guardian if applicable) is aware that this is a billable service. Verbal consent to proceed has been obtained within the past 12 months. The visit was conducted pursuant to the emergency declaration under the [address] [place] [address] authority and the [employer] General Act. Patient identification was verified, and a caregiver was present when appropriate. The patient was located in a state where the provider was credentialed to provide care. An electronic signature was used to authenticate this note.   -- Charis Kaur MD

## 2021-12-07 ENCOUNTER — VIRTUAL VISIT (OUTPATIENT)
Dept: FAMILY MEDICINE CLINIC | Age: 41
End: 2021-12-07
Payer: COMMERCIAL

## 2021-12-07 DIAGNOSIS — K21.9 GASTROESOPHAGEAL REFLUX DISEASE, UNSPECIFIED WHETHER ESOPHAGITIS PRESENT: ICD-10-CM

## 2021-12-07 DIAGNOSIS — J45.50 SEVERE PERSISTENT ASTHMA WITHOUT COMPLICATION: ICD-10-CM

## 2021-12-07 DIAGNOSIS — Z86.73 HISTORY OF TIA (TRANSIENT ISCHEMIC ATTACK): ICD-10-CM

## 2021-12-07 DIAGNOSIS — E11.69 DIABETES MELLITUS TYPE 2 IN OBESE (HCC): Primary | ICD-10-CM

## 2021-12-07 DIAGNOSIS — E66.9 DIABETES MELLITUS TYPE 2 IN OBESE (HCC): Primary | ICD-10-CM

## 2021-12-07 DIAGNOSIS — F43.21 ADJUSTMENT DISORDER WITH DEPRESSED MOOD: ICD-10-CM

## 2021-12-07 DIAGNOSIS — I10 ESSENTIAL HYPERTENSION: ICD-10-CM

## 2021-12-07 DIAGNOSIS — H18.621 ACUTE HYDROPS KERATOCONUS OF RIGHT EYE: ICD-10-CM

## 2021-12-07 PROCEDURE — 99214 OFFICE O/P EST MOD 30 MIN: CPT | Performed by: FAMILY MEDICINE

## 2021-12-07 RX ORDER — AMLODIPINE BESYLATE 5 MG/1
5 TABLET ORAL DAILY
Qty: 90 TABLET | Refills: 2 | Status: SHIPPED | OUTPATIENT
Start: 2021-12-07 | End: 2022-06-18

## 2021-12-07 RX ORDER — DULAGLUTIDE 0.75 MG/.5ML
0.75 INJECTION, SOLUTION SUBCUTANEOUS
Qty: 12 PEN | Refills: 1 | Status: SHIPPED | OUTPATIENT
Start: 2021-12-07 | End: 2022-02-01 | Stop reason: DRUGHIGH

## 2021-12-07 RX ORDER — ALBUTEROL SULFATE 90 UG/1
2 AEROSOL, METERED RESPIRATORY (INHALATION)
Qty: 3 EACH | Refills: 1 | Status: SHIPPED | OUTPATIENT
Start: 2021-12-07

## 2021-12-07 RX ORDER — INSULIN LISPRO 100 [IU]/ML
INJECTION, SOLUTION INTRAVENOUS; SUBCUTANEOUS
Qty: 10 ML | Refills: 2
Start: 2021-12-07

## 2021-12-07 RX ORDER — FLUOXETINE HYDROCHLORIDE 20 MG/1
CAPSULE ORAL
Qty: 60 CAPSULE | Refills: 1 | Status: SHIPPED | OUTPATIENT
Start: 2021-12-07 | End: 2022-05-23 | Stop reason: SDUPTHER

## 2021-12-07 RX ORDER — MONTELUKAST SODIUM 10 MG/1
10 TABLET ORAL DAILY
Qty: 90 TABLET | Refills: 2 | Status: SHIPPED | OUTPATIENT
Start: 2021-12-07 | End: 2022-07-11 | Stop reason: SDUPTHER

## 2021-12-07 RX ORDER — ATORVASTATIN CALCIUM 20 MG/1
20 TABLET, FILM COATED ORAL DAILY
Qty: 90 TABLET | Refills: 1 | Status: SHIPPED | OUTPATIENT
Start: 2021-12-07 | End: 2022-07-11 | Stop reason: SDUPTHER

## 2021-12-07 RX ORDER — PANTOPRAZOLE SODIUM 40 MG/1
40 TABLET, DELAYED RELEASE ORAL DAILY
Qty: 90 TABLET | Refills: 2 | Status: SHIPPED | OUTPATIENT
Start: 2021-12-07 | End: 2022-07-11 | Stop reason: SDUPTHER

## 2021-12-07 NOTE — PROGRESS NOTES
[facility] (: 1980) is a 46 y.o. female, established patient, here for evaluation of the following chief complaint(s):   Diabetes (Follow-up)       ASSESSMENT/PLAN:  Diagnoses and all orders for this visit:    1. Diabetes mellitus type 2 in obese [facility])  -ongoing issue. Recommending patient start checking sugars fasting before breakfast, prior to lunch if eating, and prior to dinner. planning following medication adjustments:  -70/30 insulin x 20 units in am if not able to eat breakfast and 65 units with dinner. May take 65 units with breakfast if able to eat  -Humalog- will plan to take based on sugar readings. If sugars are > 250 at lunch, please take 10 units and adjust based on sliding scale.  -Continue Trulicity 9.92 mg weekly    -     insulin nph-regular human rec (HUMULIN 70-30) 100 unit/mL (70-30) inpn; 65 units before breakfast and before dinner - dose updated on chart while on Prednisone  -     HumaLOG U-100 Insulin 100 unit/mL injection; Take 25 units before lunch and sliding scale at bedtime as directed  -     dulaglutide (Trulicity) 6.82 GO/5.0 mL sub-q pen; 0.5 mL by SubCUTAneous route every seven (7) days. -     atorvastatin (LIPITOR) 20 mg tablet; Take 1 Tablet by mouth daily. 2. Severe persistent asthma without complication-seems to be doing well on current regimen  -     montelukast (SINGULAIR) 10 mg tablet; Take 1 Tablet by mouth daily. -     albuterol (PROVENTIL HFA, VENTOLIN HFA, PROAIR HFA) 90 mcg/actuation inhaler; Take 2 Puffs by inhalation every four (4) hours as needed for Wheezing. 3. Gastroesophageal reflux disease, unspecified whether esophagitis present-continue Protonix  -     pantoprazole (PROTONIX) 40 mg tablet; Take 1 Tablet by mouth daily. 4. Essential hypertension-controlled  -     amLODIPine (NORVASC) 5 mg tablet; Take 1 Tablet by mouth daily. Take 1 Tab by mouth daily.     5. Adjustment disorder with depressed mood-new issue and likely related to ongoing diseases with asthma, diabetes, recent TIA, and keratoconus  -     FLUoxetine (PROzac) 20 mg capsule; Take 1 cap by mouth daily x 1 week and then increase to 2 caps daily    6. Acute hydrops keratoconus of right eye-currently on administrative leave and unable to drive because of this. Working with Ortho    7. History of TIA (transient ischemic attack)-stable with no new deficits. Some concerns about attention/memory but likely related to depression so treating as above. Reviewed with patient importance of having labs drawn so that we can further evaluate diabetes, lipids, and thyroid appropriately. Return in about 4 weeks (around 1/4/2022), or if symptoms worsen or fail to improve. SUBJECTIVE/OBJECTIVE:  Doing ok today. Eating much less now. Feels slightly off.    Episodically using Prednisone for asthma. Diabetes Mellitus:  Currently taking:  -70/30 x 65 units BID with meals unless skipping breakfast and then skips insulin.  -Humalog 25 units with lunch unless skipping lunch  -Trulicity 4.00 mg weekly  Not checking sugars regularly. Reports no polyuria or polydipsia, no chest pain, dyspnea or TIA's, no numbness, tingling or pain in extremities  Eating only about 1 meal per day. Pt is a non smoker.     Lab Results   Component Value Date/Time    Hemoglobin A1c (POC) 6.5 03/30/2016 10:46 AM    Hemoglobin A1c (POC) 6.4 10/28/2015 12:06 PM    Hemoglobin A1c 8.1 (H) 03/03/2020 09:52 AM    Microalb/Creat ratio (ug/mg creat.) <2.3 04/03/2019 12:30 PM    LDL, calculated 37 08/08/2019 09:21 AM    Creatinine 0.76 03/03/2020 09:52 AM      Lab Results   Component Value Date/Time    GFR est  03/03/2020 09:52 AM    GFR est non- 03/03/2020 09:52 AM      Lab Results   Component Value Date/Time    TSH 2.660 08/08/2019 09:21 AM       ROS per HPI    No data recorded     Physical exam:  General appearance - alert, well appearing, and in no distress  Eyes -sclera anicteric, no discharge  HEENT- normocephalic, atraumatic, moist mucous membranes, no visualized neck mass  Chest -normal respiratory effort, no visualized signs of respiratory distress  Neurological - alert, awake, normal speech, no focal findings or movement disorder noted  Psych - normal mood and affect  Skin- no apparent lesions    On this date 12/07/2021 I have spent 30 minutes reviewing previous notes, test results and face to face (virtual) with the patient discussing the diagnosis and importance of compliance with the treatment plan as well as documenting on the day of the visit. [facility], was evaluated through a synchronous (real-time) audio-video encounter. The patient (or guardian if applicable) is aware that this is a billable service. Verbal consent to proceed has been obtained within the past 12 months. The visit was conducted pursuant to the emergency declaration under the [address] [place] [address], [address] [place] [address] authority and the [employer] General Act. Patient identification was verified, and a caregiver was present when appropriate. The patient was located in a state where the provider was credentialed to provide care. An electronic signature was used to authenticate this note.   -- Faraz Guillory MD

## 2021-12-07 NOTE — PROGRESS NOTES
Chief Complaint   Patient presents with    Diabetes     Follow-up   1. Have you been to the ER, urgent care clinic since your last visit? Hospitalized since your last visit? No    2. Have you seen or consulted any other health care providers outside of the [address] [place] [address] since your last visit? Include any pap smears or colon screening.  No

## 2021-12-23 RX ORDER — SULFAMETHOXAZOLE AND TRIMETHOPRIM 800; 160 MG/1; MG/1
1 TABLET ORAL 2 TIMES DAILY
Qty: 14 TABLET | Refills: 0 | Status: SHIPPED | OUTPATIENT
Start: 2021-12-23 | End: 2021-12-30

## 2021-12-23 NOTE — TELEPHONE ENCOUNTER
Called office regarding infected ingrown toenail redness and swelling. Was told to call office if became worse.

## 2022-01-29 ENCOUNTER — HOSPITAL ENCOUNTER (EMERGENCY)
Age: 42
Discharge: HOME | End: 2022-01-29
Attending: EMERGENCY MEDICINE
Payer: COMMERCIAL

## 2022-01-29 VITALS
BODY MASS INDEX: 50.02 KG/M2 | WEIGHT: 293 LBS | OXYGEN SATURATION: 98 % | DIASTOLIC BLOOD PRESSURE: 98 MMHG | TEMPERATURE: 98 F | RESPIRATION RATE: 16 BRPM | SYSTOLIC BLOOD PRESSURE: 181 MMHG | HEIGHT: 64 IN | HEART RATE: 86 BPM

## 2022-01-29 DIAGNOSIS — H57.11 ACUTE RIGHT EYE PAIN: ICD-10-CM

## 2022-01-29 DIAGNOSIS — H18.621 ACUTE HYDROPS KERATOCONUS OF RIGHT EYE: ICD-10-CM

## 2022-01-29 DIAGNOSIS — H18.20 CORNEAL EDEMA OF RIGHT EYE: Primary | ICD-10-CM

## 2022-01-29 PROCEDURE — 74011250637 HC RX REV CODE- 250/637: Performed by: STUDENT IN AN ORGANIZED HEALTH CARE EDUCATION/TRAINING PROGRAM

## 2022-01-29 PROCEDURE — 74011000250 HC RX REV CODE- 250: Performed by: STUDENT IN AN ORGANIZED HEALTH CARE EDUCATION/TRAINING PROGRAM

## 2022-01-29 PROCEDURE — 99284 EMERGENCY DEPT VISIT MOD MDM: CPT

## 2022-01-29 RX ORDER — TRAMADOL HYDROCHLORIDE 50 MG/1
50 TABLET ORAL
Qty: 9 TABLET | Refills: 0 | Status: SHIPPED | OUTPATIENT
Start: 2022-01-29 | End: 2022-02-01

## 2022-01-29 RX ORDER — TETRACAINE HYDROCHLORIDE 5 MG/ML
1 SOLUTION OPHTHALMIC
Status: COMPLETED | OUTPATIENT
Start: 2022-01-29 | End: 2022-01-29

## 2022-01-29 RX ORDER — HYDROCODONE BITARTRATE AND ACETAMINOPHEN 7.5; 325 MG/1; MG/1
1 TABLET ORAL ONCE
Status: COMPLETED | OUTPATIENT
Start: 2022-01-29 | End: 2022-01-29

## 2022-01-29 RX ADMIN — TETRACAINE HYDROCHLORIDE 1 DROP: 5 SOLUTION OPHTHALMIC at 09:24

## 2022-01-29 RX ADMIN — FLUORESCEIN SODIUM 1 STRIP: 1 STRIP OPHTHALMIC at 09:24

## 2022-01-29 RX ADMIN — HYDROCODONE BITARTRATE AND ACETAMINOPHEN 1 TABLET: 7.5; 325 TABLET ORAL at 09:23

## 2022-01-29 NOTE — ED PROVIDER NOTES
68-year-old female with history of T2DM, morbid obesity, corneal edema, and asthma presents to ED with worsening of right eye pain. Patient reports that over a year and a half now she has been diagnosed with corneal edema and acute hydrops keratoconus of the right eye. She has been told that there is nothing that they can do surgically for this and her only option is pain control at home. She follows Dr. Bridget Salinas for this issue. However, yesterday morning when she woke up she felt like the pain was more severe and her right eye was more swollen. She also noted some redness to her right eye. This is since progressed to photophobia, and pain to the point where she cannot open her right eye. She denies any trauma, changes in medication, fevers, chills, cough, chest pain, shortness of breath. The history is provided by the patient. Eye Pain  Pertinent negatives include no chest pain, no headaches and no shortness of breath.         Past Medical History:   Diagnosis Date    Asthma     Diabetes ([address]     Diabetes mellitus type 2 in obese -- Diet controlled 10/28/2014    Morbid obesity ([address] 10/28/2014       Past Surgical History:   Procedure Laterality Date    HX GYN      3 C-sections    HX GYN      Miscarriage         Family History:   Problem Relation Age of Onset    Hypertension Maternal Aunt     Hypertension Maternal Uncle     Thyroid Disease Maternal Uncle     Hypertension Maternal Grandmother     Cancer Maternal Grandfather     Hypertension Maternal Grandfather     Diabetes Paternal Grandmother     Cancer Paternal Grandmother     Hypertension Mother     Alcohol abuse Father     Substance Abuse Father     Hypertension Maternal Aunt     Hypertension Maternal Uncle     Stroke Maternal Uncle     Hypertension Maternal Uncle     Alcohol abuse Maternal Uncle     Hypertension Maternal Uncle     Hypertension Maternal Uncle     Arrhythmia Maternal Uncle        Social History     Socioeconomic History    Marital status: SINGLE     Spouse name: Not on file    Number of children: Not on file    Years of education: Not on file    Highest education level: Not on file   Occupational History    Not on file   Tobacco Use    Smoking status: Former Smoker     Packs/day: 0.10     Types: Cigarettes     Start date: 2014     Quit date: 2016     Years since quittin.0    Smokeless tobacco: Never Used   Vaping Use    Vaping Use: Never used   Substance and Sexual Activity    Alcohol use: Yes     Alcohol/week: 0.0 standard drinks     Comment: last  drink was 1year ago    Drug use: No    Sexual activity: Yes     Partners: Male   Other Topics Concern    Not on file   Social History Narrative    Not on file     Social Determinants of Health     Financial Resource Strain:     Difficulty of Paying Living Expenses: Not on file   Food Insecurity:     Worried About Running Out of Food in the Last Year: Not on file    Roxanna of Food in the Last Year: Not on file   Transportation Needs:     Lack of Transportation (Medical): Not on file    Lack of Transportation (Non-Medical):  Not on file   Physical Activity:     Days of Exercise per Week: Not on file    Minutes of Exercise per Session: Not on file   Stress:     Feeling of Stress : Not on file   Social Connections:     Frequency of Communication with Friends and Family: Not on file    Frequency of Social Gatherings with Friends and Family: Not on file    Attends Sikh Services: Not on file    Active Member of Clubs or Organizations: Not on file    Attends Club or Organization Meetings: Not on file    Marital Status: Not on file   Intimate Partner Violence:     Fear of Current or Ex-Partner: Not on file    Emotionally Abused: Not on file    Physically Abused: Not on file    Sexually Abused: Not on file   Housing Stability:     Unable to Pay for Housing in the Last Year: Not on file    Number of [place] in the Last Year: Not on file    Unstable Housing in the Last Year: Not on file         ALLERGIES: Metformin and Pcn [penicillins]    Review of Systems   Constitutional: Negative for fever. HENT: Negative for congestion and sinus pressure. Eyes: Positive for photophobia, pain, redness and visual disturbance. Respiratory: Negative for shortness of breath. Cardiovascular: Negative for chest pain. Gastrointestinal: Negative for nausea and vomiting. Genitourinary: Negative for dysuria. Musculoskeletal: Negative for myalgias. Neurological: Negative for dizziness and headaches. Hematological: Negative for adenopathy. Psychiatric/Behavioral: The patient is not nervous/anxious. All other systems reviewed and are negative. Vitals:    01/29/22 0903   BP: (!) 175/86   Pulse: 100   Resp: 16   Temp: 98 °F (36.7 °C)   SpO2: 97%   Weight: (!) 161 kg (355 lb)   Height: 5' 4\" (1.626 m)            Physical Exam  Vitals and nursing note reviewed. Constitutional:       General: She is not in acute distress. Appearance: Normal appearance. She is obese. HENT:      Head: Normocephalic and atraumatic. Eyes:      General:         Right eye: Discharge present. No foreign body. Intraocular pressure: Measurements were taken using a handheld tonometer. Extraocular Movements: Extraocular movements intact. Right eye: Normal extraocular motion. Left eye: Normal extraocular motion. Conjunctiva/sclera:      Right eye: Right conjunctiva is injected. No exudate. Pupils: Pupils are equal, round, and reactive to light. Comments: Right eyelid swollen  Fluorescein stain unremarkable for any abrasions or ulcers  Patient could not tolerate tonometry readings   Cardiovascular:      Rate and Rhythm: Normal rate and regular rhythm. Heart sounds: Normal heart sounds. Pulmonary:      Breath sounds: Normal breath sounds. Abdominal:      Palpations: Abdomen is soft. Tenderness:  There is no abdominal tenderness. Lymphadenopathy:      Cervical: No cervical adenopathy. Skin:     General: Skin is warm and dry. Neurological:      General: No focal deficit present. Mental Status: She is alert and oriented to person, place, and time. Psychiatric:         Mood and Affect: Mood normal.         Behavior: Behavior normal.         Thought Content: Thought content normal.          MDM  Number of Diagnoses or Management Options  Acute hydrops keratoconus of right eye  Acute right eye pain  Corneal edema of right eye  Diagnosis management comments: 42-year-old female with history of T2DM, morbid obesity, corneal edema, and asthma presents to ED with worsening of right eye pain. Physical exam remarkable for right eye conjunctival injection, orbital swelling, tearing and photophobia. Fluorescein stain was unremarkable and patient cannot tolerate tonometry readings. Appreciate assistance from Dr. Ginger Duong and [facility] ophthalmology on-call. Please see Dr. Sheehan Rule note for discussions with [facility] ophthalmology. Patient opted for discharge with outpatient follow-up tomorrow in ophthalmology clinic. She will continue to manage pain at home.        Amount and/or Complexity of Data Reviewed  Discuss the patient with other providers: yes           Procedures

## 2022-01-29 NOTE — ED NOTES
Patient seen and evaluated, poorly tolerating Hebert-Pen. Obvious proptosis of the right eye. Patient discussed with ophthalmology service at [facility] (Dr. Atiya Lal). Who offered transfer, clinic follow-up tomorrow versus Tuesday as options, discussed with patient at bedside and patient prefers not to be transferred today, will provide her contact information for ophthalmology service and discharge home with increased pain medicine. Return precautions given.

## 2022-01-29 NOTE — ED TRIAGE NOTES
Pt to ED for increasing pain and swelling to R eye worsening last several days with photosensitivity. Pt reports this is a chronic concern, has been seen by opto and unable to perform surgery.  Denies recent trauma or injury

## 2022-02-01 ENCOUNTER — VIRTUAL VISIT (OUTPATIENT)
Dept: FAMILY MEDICINE CLINIC | Age: 42
End: 2022-02-01
Payer: COMMERCIAL

## 2022-02-01 DIAGNOSIS — H18.20 CORNEAL EDEMA OF RIGHT EYE: ICD-10-CM

## 2022-02-01 DIAGNOSIS — E66.9 DIABETES MELLITUS TYPE 2 IN OBESE (HCC): ICD-10-CM

## 2022-02-01 DIAGNOSIS — E66.01 MORBID OBESITY WITH BMI OF 60.0-69.9, ADULT (HCC): ICD-10-CM

## 2022-02-01 DIAGNOSIS — H18.621 ACUTE HYDROPS KERATOCONUS OF RIGHT EYE: Primary | ICD-10-CM

## 2022-02-01 DIAGNOSIS — H18.603 KERATOCONUS OF BOTH EYES: ICD-10-CM

## 2022-02-01 DIAGNOSIS — E11.69 DIABETES MELLITUS TYPE 2 IN OBESE (HCC): ICD-10-CM

## 2022-02-01 PROCEDURE — 99213 OFFICE O/P EST LOW 20 MIN: CPT | Performed by: FAMILY MEDICINE

## 2022-02-01 RX ORDER — ACETAMINOPHEN AND CODEINE PHOSPHATE 300; 30 MG/1; MG/1
1 TABLET ORAL
Qty: 28 TABLET | Refills: 0 | Status: SHIPPED | OUTPATIENT
Start: 2022-02-01 | End: 2022-02-08

## 2022-02-01 NOTE — PROGRESS NOTES
[facility] (: 1980) is a 46 y.o. female, established patient, here for evaluation of the following chief complaint(s):   Follow-up (ER Visit)       ASSESSMENT/PLAN: Needs to see ophthalmology again. Following at [facility]. Using artificial tears from ER and was given tramadol. Discussed options and will write for Tylenol 3 again as needed for severe pain but ultimately needs to continue working with pericardial specialist. Sugars have improved and weight is coming down but needs new labs so encouraged her to have these drawn again. Asthma has continued to remain well controlled    Below is the assessment and plan developed based on review of pertinent history, labs, studies, and medications. 1. Acute hydrops keratoconus of right eye  -     acetaminophen-codeine (Tylenol-Codeine #3) 300-30 mg per tablet; Take 1 Tablet by mouth every six (6) hours as needed for Pain for up to 7 days. Max Daily Amount: 4 Tablets., Normal, Disp-28 Tablet, R-0  2. Diabetes mellitus type 2 in obese (HCC)  -     dulaglutide (TRULICITY) 1.5 QW/8.6 mL sub-q pen; 0.5 mL by SubCUTAneous route every seven (7) days for 90 days. , Normal, Disp-6 mL, R-0Dose change  3. Corneal edema of right eye  -     acetaminophen-codeine (Tylenol-Codeine #3) 300-30 mg per tablet; Take 1 Tablet by mouth every six (6) hours as needed for Pain for up to 7 days. Max Daily Amount: 4 Tablets., Normal, Disp-28 Tablet, R-0  4. Keratoconus of both eyes  -     acetaminophen-codeine (Tylenol-Codeine #3) 300-30 mg per tablet; Take 1 Tablet by mouth every six (6) hours as needed for Pain for up to 7 days. Max Daily Amount: 4 Tablets., Normal, Disp-28 Tablet, R-0  5. Morbid obesity with BMI of 60.0-69.9, adult ([address]      No follow-ups on file. SUBJECTIVE/OBJECTIVE:  Stable overall. Seen in the ER recently for severe eye pain related to her hydrops/corneal edema in setting of keratoconus.  Continues working with corneal specialist on this but was unable to get follow-up at [facility] after the ER visit. Doing well with her diabetes control. Has not had her labs checked in over a year. Encouraged her to get this done. Does feel like her weight is improving. Stable on 70/30 x 65 units twice daily and Trulicity. Denies any hypoglycemia. No recent asthma flares. Failed treatment on Nucala and has been awaiting Dupixent. Has continued on low-dose prednisone 5 mg most days during the winter and would eventually like to get off prednisone. ROS per HPI    No data recorded     Physical exam:  General appearance - alert, well appearing, and in no distress  Eyes -sclera anicteric, no discharge  HEENT- normocephalic, atraumatic, moist mucous membranes, no visualized neck mass  Chest -normal respiratory effort, no visualized signs of respiratory distress  Neurological - alert, awake, normal speech, no focal findings or movement disorder noted  Psych - normal mood and affect  Skin- no apparent lesions      On this date 02/01/2022 I have spent 20 minutes reviewing previous notes, test results and face to face (virtual) with the patient discussing the diagnosis and importance of compliance with the treatment plan as well as documenting on the day of the visit. [facility], was evaluated through a synchronous (real-time) audio-video encounter. The patient (or guardian if applicable) is aware that this is a billable service, which includes applicable co-pays. Verbal consent to proceed has been obtained. The visit was conducted pursuant to the emergency declaration under the [facility] authority and the [employer] and [employer] General Act. Patient identification was verified, and a caregiver was present when appropriate. The patient was located at home in a state where the provider was licensed to provide care. An electronic signature was used to authenticate this note.   -- Aydin Moe MD

## 2022-02-01 NOTE — PROGRESS NOTES
Chief Complaint   Patient presents with    Follow-up     ER Visit   1. Have you been to the ER, urgent care clinic since your last visit? Hospitalized since your last visit? Yes Where: [facility] ER Eye pain    2. Have you seen or consulted any other health care providers outside of the [address] [place] [address] since your last visit? Include any pap smears or colon screening.  No

## 2022-02-22 DIAGNOSIS — Z00.00 WELL ADULT EXAM: Primary | ICD-10-CM

## 2022-02-22 DIAGNOSIS — E11.69 DIABETES MELLITUS TYPE 2 IN OBESE (HCC): ICD-10-CM

## 2022-02-22 DIAGNOSIS — Z11.59 NEED FOR HEPATITIS C SCREENING TEST: ICD-10-CM

## 2022-02-22 DIAGNOSIS — Z86.73 HISTORY OF TIA (TRANSIENT ISCHEMIC ATTACK): ICD-10-CM

## 2022-02-22 DIAGNOSIS — I10 ESSENTIAL HYPERTENSION: ICD-10-CM

## 2022-02-22 DIAGNOSIS — E66.01 MORBID OBESITY WITH BMI OF 60.0-69.9, ADULT (HCC): ICD-10-CM

## 2022-02-22 DIAGNOSIS — E66.9 DIABETES MELLITUS TYPE 2 IN OBESE (HCC): ICD-10-CM

## 2022-02-28 LAB
25(OH)D3+25(OH)D2 SERPL-MCNC: 12.2 NG/ML (ref 30–100)
ALBUMIN SERPL-MCNC: 4.6 G/DL (ref 3.8–4.8)
ALBUMIN/CREAT UR: 18 MG/G CREAT (ref 0–29)
ALBUMIN/GLOB SERPL: 1.6 {RATIO} (ref 1.2–2.2)
ALP SERPL-CCNC: 128 IU/L (ref 44–121)
ALT SERPL-CCNC: 30 IU/L (ref 0–32)
APPEARANCE UR: ABNORMAL
AST SERPL-CCNC: 43 IU/L (ref 0–40)
BILIRUB SERPL-MCNC: <0.2 MG/DL (ref 0–1.2)
BILIRUB UR QL STRIP: NEGATIVE
BUN SERPL-MCNC: 10 MG/DL (ref 6–24)
BUN/CREAT SERPL: 12 (ref 9–23)
CALCIUM SERPL-MCNC: 9.9 MG/DL (ref 8.7–10.2)
CHLORIDE SERPL-SCNC: 97 MMOL/L (ref 96–106)
CHOLEST SERPL-MCNC: 182 MG/DL (ref 100–199)
CO2 SERPL-SCNC: 19 MMOL/L (ref 20–29)
COLOR UR: YELLOW
CREAT SERPL-MCNC: 0.82 MG/DL (ref 0.57–1)
CREAT UR-MCNC: 265 MG/DL
ERYTHROCYTE [DISTWIDTH] IN BLOOD BY AUTOMATED COUNT: 12.7 % (ref 11.7–15.4)
EST. AVERAGE GLUCOSE BLD GHB EST-MCNC: 292 MG/DL
GLOBULIN SER CALC-MCNC: 2.8 G/DL (ref 1.5–4.5)
GLUCOSE SERPL-MCNC: 285 MG/DL (ref 65–99)
GLUCOSE UR QL STRIP: ABNORMAL
HBA1C MFR BLD: 11.8 % (ref 4.8–5.6)
HCT VFR BLD AUTO: 42.1 % (ref 34–46.6)
HCV AB S/CO SERPL IA: <0.1 S/CO RATIO (ref 0–0.9)
HDLC SERPL-MCNC: 55 MG/DL
HGB BLD-MCNC: 13.6 G/DL (ref 11.1–15.9)
HGB UR QL STRIP: NEGATIVE
KETONES UR QL STRIP: ABNORMAL
LDLC SERPL CALC-MCNC: 105 MG/DL (ref 0–99)
LEUKOCYTE ESTERASE UR QL STRIP: NEGATIVE
MCH RBC QN AUTO: 29 PG (ref 26.6–33)
MCHC RBC AUTO-ENTMCNC: 32.3 G/DL (ref 31.5–35.7)
MCV RBC AUTO: 90 FL (ref 79–97)
MICRO URNS: ABNORMAL
MICROALBUMIN UR-MCNC: 47.6 UG/ML
NITRITE UR QL STRIP: NEGATIVE
PH UR STRIP: 5.5 [PH] (ref 5–7.5)
PLATELET # BLD AUTO: 389 X10E3/UL (ref 150–450)
POTASSIUM SERPL-SCNC: 4.7 MMOL/L (ref 3.5–5.2)
PROT SERPL-MCNC: 7.4 G/DL (ref 6–8.5)
PROT UR QL STRIP: ABNORMAL
RBC # BLD AUTO: 4.69 X10E6/UL (ref 3.77–5.28)
SODIUM SERPL-SCNC: 137 MMOL/L (ref 134–144)
SP GR UR STRIP: 1.03 (ref 1–1.03)
TRIGL SERPL-MCNC: 121 MG/DL (ref 0–149)
TSH SERPL DL<=0.005 MIU/L-ACNC: 3.23 UIU/ML (ref 0.45–4.5)
UROBILINOGEN UR STRIP-MCNC: 0.2 MG/DL (ref 0.2–1)
VLDLC SERPL CALC-MCNC: 22 MG/DL (ref 5–40)
WBC # BLD AUTO: 10.2 X10E3/UL (ref 3.4–10.8)

## 2022-03-09 ENCOUNTER — VIRTUAL VISIT (OUTPATIENT)
Dept: FAMILY MEDICINE CLINIC | Age: 42
End: 2022-03-09
Payer: COMMERCIAL

## 2022-03-09 DIAGNOSIS — E66.9 DIABETES MELLITUS TYPE 2 IN OBESE (HCC): ICD-10-CM

## 2022-03-09 DIAGNOSIS — H18.621 ACUTE HYDROPS KERATOCONUS OF RIGHT EYE: ICD-10-CM

## 2022-03-09 DIAGNOSIS — H18.603 KERATOCONUS OF BOTH EYES: ICD-10-CM

## 2022-03-09 DIAGNOSIS — E11.65 UNCONTROLLED TYPE 2 DIABETES MELLITUS WITH HYPERGLYCEMIA (HCC): Primary | ICD-10-CM

## 2022-03-09 DIAGNOSIS — E11.69 DIABETES MELLITUS TYPE 2 IN OBESE (HCC): ICD-10-CM

## 2022-03-09 DIAGNOSIS — I10 PRIMARY HYPERTENSION: ICD-10-CM

## 2022-03-09 PROCEDURE — 99214 OFFICE O/P EST MOD 30 MIN: CPT | Performed by: FAMILY MEDICINE

## 2022-03-09 RX ORDER — BLOOD-GLUCOSE SENSOR
EACH MISCELLANEOUS
Qty: 3 EACH | Refills: 1 | Status: SHIPPED | OUTPATIENT
Start: 2022-03-09 | End: 2022-04-06 | Stop reason: SDUPTHER

## 2022-03-09 RX ORDER — BLOOD-GLUCOSE TRANSMITTER
EACH MISCELLANEOUS
Qty: 1 EACH | Refills: 0 | Status: SHIPPED | OUTPATIENT
Start: 2022-03-09 | End: 2022-07-03

## 2022-03-09 RX ORDER — GLIPIZIDE 5 MG/1
5 TABLET ORAL
Qty: 90 TABLET | Refills: 0 | Status: SHIPPED | OUTPATIENT
Start: 2022-03-09 | End: 2022-04-21 | Stop reason: SDUPTHER

## 2022-03-09 NOTE — PROGRESS NOTES
Chief Complaint   Patient presents with    Diabetes     Follow-up   1. Have you been to the ER, urgent care clinic since your last visit? Hospitalized since your last visit? No    2. Have you seen or consulted any other health care providers outside of the [address] [place] [address] since your last visit? Include any pap smears or colon screening.  No

## 2022-03-09 NOTE — PROGRESS NOTES
[facility] (: 1980) is a 38 y.o. female, established patient, here for evaluation of the following chief complaint(s):   Diabetes (Follow-up)       ASSESSMENT/PLAN:    Incr 70/30 to 70 units twice daily. May incr to 75 units twice daily in 2 weeks if sugars ct staying above 200. Restarting Trulicity - to see our Pharm D for samples starting at 0.75 mg weekly. Starting Glipizide 5 mg with dinner. To ct checking sugars frequently with Dexcom. To clear for surgery after sugars improve. Below is the assessment and plan developed based on review of pertinent history, labs, studies, and medications. 1. Uncontrolled type 2 diabetes mellitus with hyperglycemia (HCC)  -     Blood-Glucose Transmitter (Dexcom G6 Transmitter) ta; Check sugars 5-6 x per day, Normal, Disp-1 Each, R-0  -     Blood-Glucose Sensor (Dexcom G6 Sensor) ta; Check sugars 5-6 x per day, Normal, Disp-3 Each, R-1  2. Diabetes mellitus type 2 in obese (HCC)  -     dulaglutide (TRULICITY) 1.5 HC/9.9 mL sub-q pen; 0.5 mL by SubCUTAneous route every seven (7) days. , Normal, Disp-6 mL, R-3Dose change  -     insulin nph-regular human rec (HUMULIN 70-30) 100 unit/mL (70-30) inpn; 70 units before breakfast and before dinner - dose updated on chart, No Print, Disp-50 mL, R-0  -     glipiZIDE (GLUCOTROL) 5 mg tablet; Take 1 Tablet by mouth Daily (before dinner). , Normal, Disp-90 Tablet, R-0  3. Primary hypertension  4. Keratoconus of both eyes  5. Acute hydrops keratoconus of right eye      Return in about 4 weeks (around 2022), or if symptoms worsen or fail to improve. SUBJECTIVE/OBJECTIVE:    Still working with Dr. Lucía Cordero on keratoconus. Diabetes Mellitus: Off steroids for months. Out of Trulicity. Down 20 lbs since last appt. Sugars not checked for a week but was seeing sugars in 200-300s.   Reports no polyuria or polydipsia, no chest pain, dyspnea or TIA's, no numbness, tingling or pain in extremities, last eye exam approximately < 1 yr ago. Exercises regularly with walking. Compliant with diabetic diet. Avoids sodas and sweet teas. Avoids fast food. Limits carbs. Pt is a non smoker. Lab Results   Component Value Date/Time    Hemoglobin A1c (POC) 6.5 03/30/2016 10:46 AM    Hemoglobin A1c (POC) 6.4 10/28/2015 12:06 PM    Hemoglobin A1c 11.8 (H) 02/26/2022 09:21 AM    Microalb/Creat ratio (ug/mg creat.) 18 02/26/2022 09:21 AM    LDL, calculated 105 (H) 02/26/2022 09:21 AM    LDL, calculated 37 08/08/2019 09:21 AM    Creatinine 0.82 02/26/2022 09:21 AM      Lab Results   Component Value Date/Time    GFR est  02/26/2022 09:21 AM    GFR est non-AA 90 02/26/2022 09:21 AM      Lab Results   Component Value Date/Time    TSH 3.230 02/26/2022 09:21 AM       ROS per HPI    No data recorded     Physical exam:  General appearance - alert, well appearing, and in no distress  Eyes -sclera anicteric, no discharge  HEENT- normocephalic, atraumatic, moist mucous membranes, no visualized neck mass  Chest -normal respiratory effort, no visualized signs of respiratory distress  Neurological - alert, awake, normal speech, no focal findings or movement disorder noted  Psych - normal mood and affect  Skin- no apparent lesions    On this date 03/09/2022 I have spent 30 minutes reviewing previous notes, test results and face to face (virtual) with the patient discussing the diagnosis and importance of compliance with the treatment plan as well as documenting on the day of the visit. [facility], was evaluated through a synchronous (real-time) audio-video encounter. The patient (or guardian if applicable) is aware that this is a billable service, which includes applicable co-pays. Verbal consent to proceed has been obtained.  The visit was conducted pursuant to the emergency declaration under the [facility] waiver authority and the [employer] Appropriations Act. Patient identification was verified, and a caregiver was present when appropriate. The patient was located at home in a state where the provider was licensed to provide care. An electronic signature was used to authenticate this note.   -- Bay Orozco MD

## 2022-03-14 ENCOUNTER — TELEPHONE (OUTPATIENT)
Dept: FAMILY MEDICINE CLINIC | Age: 42
End: 2022-03-14

## 2022-03-14 NOTE — TELEPHONE ENCOUNTER
Called pt regarding trulicity. Awaiting Trulicity 1.5 mg samples and will let pt know when arrives. Will mail pt a one time card to get 4 pens for zero copay and a copay reducing card to try to see if the cost goes down since she has commercial insurance. Marti Whitley, PHARMD, [facility] [address] in place:  Yes   Recommendation Provided To: Patient/Caregiver: 1 via Telephone   Intervention Detail: Patient Access Assistance/Sample Provided   Intervention Accepted By: Patient/Caregiver: 1   Time Spent (min): 10

## 2022-03-14 NOTE — TELEPHONE ENCOUNTER
----- Message from Anju Hearn LPN sent at 3/84/7068 12:52 PM EST -----  Thanks that would be great and yes she has commercial insurance  ----- Message -----  From: Deena Mueller, PHARMD  Sent: 3/10/2022   8:15 AM EST  To: Anju Hearn LPN    Hi, I only have 0.75 mg samples. I can ask for the 1.5 mg dose samples that will likely come in in a week or so  - I think this is  whats he's taking???  I also have a card for one month supply at zero copay I can provide  Let me know what you want you want to do. Looks like she has commercial insurance. Is this correct? There is a copay reducing card as well.   ----- Message -----  From: Bernabe Hernandez LPN  Sent: 4/9/8565   3:21 PM EST  To: Grazyna Jo PHARMD    Samples of Trulicity and finance assistance. She will need me to  her samples. It is my daughter

## 2022-03-23 ENCOUNTER — TELEPHONE (OUTPATIENT)
Dept: FAMILY MEDICINE CLINIC | Age: 42
End: 2022-03-23
Payer: COMMERCIAL

## 2022-03-23 DIAGNOSIS — E11.69 DIABETES MELLITUS TYPE 2 IN OBESE (HCC): ICD-10-CM

## 2022-03-23 DIAGNOSIS — E66.9 DIABETES MELLITUS TYPE 2 IN OBESE (HCC): ICD-10-CM

## 2022-03-23 PROCEDURE — 3046F HEMOGLOBIN A1C LEVEL >9.0%: CPT | Performed by: PHARMACIST

## 2022-03-23 NOTE — TELEPHONE ENCOUNTER
Per Diabetes CPA with Dr. Jax Rangel, providing sample pens for patient for Trulicity 1.5 mg every 7 days  Order Rx 4 pens sent to pharmacy to use zero copay voucher  Give copay card to use moving forward (for use with commercial insurance). Contact me with any issues. Vince Tamez, PHARMD, [facility] [address] in place:  Yes   Recommendation Provided To: Patient/Caregiver: 3 via Telephone   Intervention Detail: Patient Access Assistance/Sample Provided   Intervention Accepted By: Patient/Caregiver: 3   Time Spent (min): 20

## 2022-03-29 DIAGNOSIS — G89.29 CHRONIC PAIN OF LEFT KNEE: ICD-10-CM

## 2022-03-29 DIAGNOSIS — M25.562 CHRONIC PAIN OF LEFT KNEE: ICD-10-CM

## 2022-03-29 DIAGNOSIS — M79.642 BILATERAL HAND PAIN: ICD-10-CM

## 2022-03-29 DIAGNOSIS — M76.52 PATELLAR TENDINITIS OF LEFT KNEE: ICD-10-CM

## 2022-03-29 DIAGNOSIS — M79.641 BILATERAL HAND PAIN: ICD-10-CM

## 2022-03-29 DIAGNOSIS — M62.838 MUSCLE SPASM: ICD-10-CM

## 2022-03-30 RX ORDER — MELOXICAM 15 MG/1
15 TABLET ORAL
Qty: 30 TABLET | Refills: 0 | Status: SHIPPED | OUTPATIENT
Start: 2022-03-30 | End: 2022-06-08 | Stop reason: ALTCHOICE

## 2022-03-30 RX ORDER — CYCLOBENZAPRINE HCL 5 MG
5 TABLET ORAL
Qty: 30 TABLET | Refills: 0 | Status: SHIPPED | OUTPATIENT
Start: 2022-03-30 | End: 2022-06-08 | Stop reason: ALTCHOICE

## 2022-04-06 ENCOUNTER — VIRTUAL VISIT (OUTPATIENT)
Dept: FAMILY MEDICINE CLINIC | Age: 42
End: 2022-04-06
Payer: COMMERCIAL

## 2022-04-06 DIAGNOSIS — J45.50 SEVERE PERSISTENT ASTHMA WITHOUT COMPLICATION: ICD-10-CM

## 2022-04-06 DIAGNOSIS — E11.65 UNCONTROLLED TYPE 2 DIABETES MELLITUS WITH HYPERGLYCEMIA (HCC): Primary | ICD-10-CM

## 2022-04-06 PROCEDURE — 99213 OFFICE O/P EST LOW 20 MIN: CPT | Performed by: FAMILY MEDICINE

## 2022-04-06 PROCEDURE — 3046F HEMOGLOBIN A1C LEVEL >9.0%: CPT | Performed by: FAMILY MEDICINE

## 2022-04-06 RX ORDER — PREDNISONE 20 MG/1
TABLET ORAL
COMMUNITY
Start: 2022-04-05 | End: 2022-05-05 | Stop reason: ALTCHOICE

## 2022-04-06 RX ORDER — BLOOD-GLUCOSE SENSOR
EACH MISCELLANEOUS
Qty: 3 EACH | Refills: 2 | Status: SHIPPED | OUTPATIENT
Start: 2022-04-06 | End: 2022-04-21 | Stop reason: SDUPTHER

## 2022-04-06 NOTE — PROGRESS NOTES
Chief Complaint   Patient presents with    Diabetes     Follow-up   1. Have you been to the ER, urgent care clinic since your last visit? Hospitalized since your last visit? Yes [facility] Asthma attack    2. Have you seen or consulted any other health care providers outside of the [address] [place] [address] since your last visit? Include any pap smears or colon screening.  No

## 2022-04-06 NOTE — PROGRESS NOTES
[facility] (: 1980) is a 38 y.o. female, established patient, here for evaluation of the following chief complaint(s):   Diabetes (Follow-up)       ASSESSMENT/PLAN: Ct with DexCom. Adding qhs insulin 10 units while on prednisone. Ct 70/30 x 70 units BID and mealtime insulin 25 units prior to lunch. Encouraged getting back to exercise after fully recovered from asthma flare. To start [id number] soon for asthma. Ongoing issues with DM and rec seeing Endo again. Below is the assessment and plan developed based on review of pertinent history, labs, studies, and medications. 1. Uncontrolled type 2 diabetes mellitus with hyperglycemia (HCC)  -     Blood-Glucose Sensor (Dexcom G6 Sensor) ta; Check sugars 5-6 x per day. Needs filled early due to problem with sensor recently, Normal, Disp-3 Each, R-2  -     REFERRAL TO ENDOCRINOLOGY  2. Severe persistent asthma without complication      Return in about 4 weeks (around 2022). SUBJECTIVE/OBJECTIVE:    ER visit recently with asthma attack. Still mildly tight. Back on prednisone for 5 days. Sugars were improving previously. Sugars went up to > 300 since then. Prior to this was getting sugars < 200 and occ getting hypoglycemia. Doing well with Dexcom. Desi Trulicity well. Was diet controlled about 2 yrs ago but recurrent asthma flares led to ongoing prednisone use and set off hyperglycemia. Last A1c was 11.8% which was down from her last hospitalization A1C of 13% a few months prior. She is down sig on her weight over the past.  Was walking daily and making changes to diet. Finally able to get Dupixant approved and will start on Dupixant at that time.     ROS per HPI    Patient-Reported Systolic (Top): 694  Patient-Reported Diastolic (Bottom): 68  Patient-Reported Weight: 300.2lbs       Physical exam:  General appearance - alert, well appearing, and in no distress, hoarse voice  Eyes -sclera anicteric, no discharge  HEENT- normocephalic, atraumatic, moist mucous membranes, no visualized neck mass  Chest -normal respiratory effort, no visualized signs of respiratory distress  Neurological - alert, awake, normal speech, no focal findings or movement disorder noted  Psych - normal mood and affect  Skin- no apparent lesions      On this date 04/06/2022 I have spent 20 minutes reviewing previous notes, test results and face to face (virtual) with the patient discussing the diagnosis and importance of compliance with the treatment plan as well as documenting on the day of the visit. [facility], was evaluated through a synchronous (real-time) audio-video encounter. The patient (or guardian if applicable) is aware that this is a billable service, which includes applicable co-pays. Verbal consent to proceed has been obtained. The visit was conducted pursuant to the emergency declaration under the [facility], [address] [place] [address] authority and the [employer] Act. Patient identification was verified, and a caregiver was present when appropriate. The patient was located at home in a state where the provider was licensed to provide care. An electronic signature was used to authenticate this note.   -- Mamie Bess MD

## 2022-04-14 ENCOUNTER — OFFICE VISIT (OUTPATIENT)
Dept: CARDIOLOGY CLINIC | Age: 42
End: 2022-04-14
Payer: COMMERCIAL

## 2022-04-14 VITALS
HEIGHT: 64 IN | OXYGEN SATURATION: 97 % | BODY MASS INDEX: 50.02 KG/M2 | SYSTOLIC BLOOD PRESSURE: 134 MMHG | WEIGHT: 293 LBS | HEART RATE: 103 BPM | DIASTOLIC BLOOD PRESSURE: 80 MMHG

## 2022-04-14 DIAGNOSIS — Z79.4 TYPE 2 DIABETES MELLITUS WITH HYPERGLYCEMIA, WITH LONG-TERM CURRENT USE OF INSULIN (HCC): ICD-10-CM

## 2022-04-14 DIAGNOSIS — R06.09 DYSPNEA ON EXERTION: ICD-10-CM

## 2022-04-14 DIAGNOSIS — E66.01 MORBID OBESITY (HCC): ICD-10-CM

## 2022-04-14 DIAGNOSIS — E11.65 TYPE 2 DIABETES MELLITUS WITH HYPERGLYCEMIA, WITH LONG-TERM CURRENT USE OF INSULIN (HCC): ICD-10-CM

## 2022-04-14 DIAGNOSIS — R06.02 SOB (SHORTNESS OF BREATH): Primary | ICD-10-CM

## 2022-04-14 DIAGNOSIS — I10 PRIMARY HYPERTENSION: ICD-10-CM

## 2022-04-14 PROCEDURE — 3046F HEMOGLOBIN A1C LEVEL >9.0%: CPT | Performed by: STUDENT IN AN ORGANIZED HEALTH CARE EDUCATION/TRAINING PROGRAM

## 2022-04-14 PROCEDURE — 99214 OFFICE O/P EST MOD 30 MIN: CPT | Performed by: STUDENT IN AN ORGANIZED HEALTH CARE EDUCATION/TRAINING PROGRAM

## 2022-04-14 PROCEDURE — 93000 ELECTROCARDIOGRAM COMPLETE: CPT | Performed by: STUDENT IN AN ORGANIZED HEALTH CARE EDUCATION/TRAINING PROGRAM

## 2022-04-14 NOTE — PROGRESS NOTES
Sheila Mckeon is a 38 y.o. female    Visit Vitals  /80 (BP 1 Location: Left upper arm, BP Patient Position: Sitting, BP Cuff Size: Adult)   Pulse (!) 103   Ht 5' 4\" (1.626 m)   Wt 332 lb (150.6 kg)   SpO2 97%   BMI 56.99 kg/m²       Chief Complaint   Patient presents with    New Patient    Shortness of Breath       Chest pain NO  SOB NO  Dizziness NO  Swelling NO  Recent hospital visit NO  Refills NO  COVID VACCINE STATUS YES  HAD COVID?  NO

## 2022-04-14 NOTE — PROGRESS NOTES
[facility] [address], [address] [facility], [address] [place] [address]    Office [phone]           Bakari Graves is a 38 y.o. female presents to the office for evaluation of shortness of breath. Consult requested by pulmonary associates. Assessment/Recommendations:      ICD-10-CM ICD-9-CM    1. SOB (shortness of breath)  R06.02 786.05 AMB POC EKG ROUTINE W/ 12 LEADS, INTER & REP   2. Primary hypertension  I10 401.9    3. Morbid obesity ([address]  E66.01 278.01    4. Type 2 diabetes mellitus with hyperglycemia, with long-term current use of insulin ([facility])  E11.65 250.00     Z79.4 790.29      V58.67        Dyspnea- stable, no anginal chest pain  - echo    T2DM  Lab Results   Component Value Date/Time    Hemoglobin A1c 11.8 (H) 02/26/2022 09:21 AM    Hemoglobin A1c (POC) 6.5 03/30/2016 10:46 AM   -Reports prior to her steroid use being diet controlled. Currently requiring Trulicity, glipizide and insulin therapy    Lab Results   Component Value Date/Time    Cholesterol, total 182 02/26/2022 09:21 AM    HDL Cholesterol 55 02/26/2022 09:21 AM    LDL, calculated 105 (H) 02/26/2022 09:21 AM    LDL, calculated 37 08/08/2019 09:21 AM    VLDL, calculated 22 02/26/2022 09:21 AM    VLDL, calculated 21 08/08/2019 09:21 AM    Triglyceride 121 02/26/2022 09:21 AM    CHOL/HDL Ratio 3.3 07/15/2016 04:46 AM   -statin therapy with her diabetes    Morbid obesity-advised patient on diet exercise for optimal cardiovascular health and glycemic control. Asthma-stable exacerbations over the last 2 years requiring long-term use of steroids which have adversely affected her glycemic control      Primary Care Physician- Anuradha Santizo MD    Follow-up 6 months        Subjective:  38 y.o. presents the office for establishment of care. Patient has longstanding diabetes since age 14. Recently uncontrolled diabetes related to steroid use.   She has had multiple asthma exacerbations over the last 2 years that required 11 ED visits and multiple admissions for management. Reports exertional dyspnea. No chest pain or chest pressure symptoms. She reports her diabetes was diet controlled until the use of chronic steroids. No strong family history of coronary artery disease. She remains on statin therapy with her diabetes. Past Medical History:   Diagnosis Date    Asthma     [facility]     Diabetes mellitus type 2 in obese -- Diet controlled 10/28/2014    Morbid obesity ([facility] 10/28/2014        Past Surgical History:   Procedure Laterality Date    HX GYN      3 C-sections    HX GYN      Miscarriage         Current Outpatient Medications:     predniSONE (DELTASONE) 20 mg tablet, 40 mg daily, Disp: , Rfl:     Blood-Glucose Sensor (Dexcom G6 Sensor) ta, Check sugars 5-6 x per day. Needs filled early due to problem with sensor recently, Disp: 3 Each, Rfl: 2    cyclobenzaprine (FLEXERIL) 5 mg tablet, Take 1 Tablet by mouth nightly., Disp: 30 Tablet, Rfl: 0    meloxicam (MOBIC) 15 mg tablet, Take 1 Tablet by mouth daily (after breakfast). Take x 1 week and then stop. May use daily after this as needed. , Disp: 30 Tablet, Rfl: 0    dulaglutide (TRULICITY) 1.5 FE/4.5 mL sub-q pen, 0.5 mL by SubCUTAneous route every seven (7) days. Use zero copay voucher pt will bring for 4 pens please., Disp: 4 Each, Rfl: 0    insulin nph-regular human rec (HUMULIN 70-30) 100 unit/mL (70-30) inpn, 70 units before breakfast and before dinner - dose updated on chart, Disp: 50 mL, Rfl: 0    glipiZIDE (GLUCOTROL) 5 mg tablet, Take 1 Tablet by mouth Daily (before dinner). , Disp: 90 Tablet, Rfl: 0    Blood-Glucose Transmitter (Dexcom G6 Transmitter) ta, Check sugars 5-6 x per day, Disp: 1 Each, Rfl: 0    pantoprazole (PROTONIX) 40 mg tablet, Take 1 Tablet by mouth daily. , Disp: 90 Tablet, Rfl: 2    montelukast (SINGULAIR) 10 mg tablet, Take 1 Tablet by mouth daily. , Disp: 90 Tablet, Rfl: 2    HumaLOG U-100 Insulin 100 unit/mL injection, Take 25 units before lunch and sliding scale at bedtime as directed, Disp: 10 mL, Rfl: 2    amLODIPine (NORVASC) 5 mg tablet, Take 1 Tablet by mouth daily. Take 1 Tab by mouth daily. , Disp: 90 Tablet, Rfl: 2    albuterol (PROVENTIL HFA, VENTOLIN HFA, PROAIR HFA) 90 mcg/actuation inhaler, Take 2 Puffs by inhalation every four (4) hours as needed for Wheezing., Disp: 3 Each, Rfl: 1    atorvastatin (LIPITOR) 20 mg tablet, Take 1 Tablet by mouth daily. , Disp: 90 Tablet, Rfl: 1    FLUoxetine (PROzac) 20 mg capsule, Take 1 cap by mouth daily x 1 week and then increase to 2 caps daily, Disp: 60 Capsule, Rfl: 1    losartan (COZAAR) 50 mg tablet, Take 1 Tablet by mouth daily. Increased dose, Disp: 90 Tablet, Rfl: 3    fluticasone propion-salmeteroL (Advair Diskus) 500-50 mcg/dose diskus inhaler, Take 1 Puff by inhalation two (2) times a day., Disp: 3 Inhaler, Rfl: 1    tiotropium bromide (SPIRIVA RESPIMAT) 2.5 mcg/actuation inhaler, Take 2 Puffs by inhalation daily. , Disp: 3 Inhaler, Rfl: 1    budesonide (PULMICORT) 1 mg/2 mL nbsp, 2 mL by Nebulization route two (2) times daily as needed for Cough. Do not use on days when using Advair, Disp: 270 Each, Rfl: 1    magnesium oxide (MAG-OX) 400 mg tablet, Take 1 Tab by mouth daily. , Disp: 90 Tab, Rfl: 1    zinc sulfate (ZINC-220 PO), Take  by mouth daily. , Disp: , Rfl:     cetirizine (ZYRTEC) 10 mg tablet, Take 10 mg by mouth daily as needed for Allergies. , Disp: , Rfl:     TRUEplus Insulin 1 mL 31 gauge x 5/16 syrg, Use with insulin, Disp: 200 Syringe, Rfl: 2    aspirin delayed-release 81 mg tablet, Take 81 mg by mouth daily. , Disp: , Rfl:     albuterol (PROVENTIL VENTOLIN) 2.5 mg /3 mL (0.083 %) nebu, , Disp: , Rfl:     ascorbic acid, vitamin C, (Vitamin C) 500 mg tablet, Take 500 mg by mouth daily. , Disp: , Rfl:     cranberry extract 450 mg tab tablet, Take 450 mg by mouth daily. , Disp: , Rfl:    hydroquinone (ESOTERICA, MELQUIN) 4 % topical cream, Apply  to affected area two (2) times a day. (Patient not taking: Reported on 10/26/2021), Disp: 30 g, Rfl: 0    Allergies   Allergen Reactions    Metformin Other (comments)     Hair loss and unstable blood sugars    Pcn [Penicillins] Swelling        Family History   Problem Relation Age of Onset    Hypertension Maternal Aunt     Hypertension Maternal Uncle     Thyroid Disease Maternal Uncle     Hypertension Maternal Grandmother     Cancer Maternal Grandfather     Hypertension Maternal Grandfather     Diabetes Paternal Grandmother     Cancer Paternal Grandmother     Hypertension Mother     Alcohol abuse Father     Substance Abuse Father     Hypertension Maternal Aunt     Hypertension Maternal Uncle     Stroke Maternal Uncle     Hypertension Maternal Uncle     Alcohol abuse Maternal Uncle     Hypertension Maternal Uncle     Hypertension Maternal Uncle     Arrhythmia Maternal Uncle        Social History     Tobacco Use    Smoking status: Former Smoker     Packs/day: 0.10     Types: Cigarettes     Start date: 2014     Quit date: 2016     Years since quittin.2    Smokeless tobacco: Never Used   Vaping Use    Vaping Use: Never used   Substance Use Topics    Alcohol use: Yes     Alcohol/week: 0.0 standard drinks     Comment: last  drink was 1year ago    Drug use: No       Review of Symptoms:  Pertinent Positive:exertional dyspnea  Pertinent Negative: No chest pain, orthopnea, PND  All Other systems reviewed and are negative for a Comprehensive ROS (10+)    Physical Exam    Blood pressure 134/80, pulse (!) 103, height 5' 4\" (1.626 m), weight 332 lb (150.6 kg), SpO2 97 %. Constitutional:  well-developed and well-nourished. No distress. HENT: Normocephalic. Eyes: No scleral icterus. Neck:  Neck supple. No JVD present.    Pulmonary/Chest: Effort normal and breath sounds normal. No respiratory distress, wheezes or rales. Cardiovascular: Normal rate, regular rhythm, S1 S2 . Exam reveals no gallop and no friction rub. No murmur heard. No edema. Extremities:  Normal muscle tone  Abdominal:   No abnormal distension. Neurological:  Moving all extremities, cranial nerves appear grossly intact. Skin: Skin is not cold. Not diaphoretic. No erythema. Psychiatric:  Grossly normal mood and affect. Intact insight. Objective Data: Investigations personally reviewed and interpreted    ECG: Borderline sinus tachycardia, nonspecific ST-T wave changes          Investigations reviewed     Echo 2016: normal lv function, no significant valvular pathology        Hanny Bermudez, DO          ATTENTION:   This medical record was transcribed using an electronic medical records/speech recognition system. Although proofread, it may and can contain electronic, spelling and other errors. Corrections may be executed at a later time. Please feel free to contact us for any clarifications as needed.

## 2022-04-21 DIAGNOSIS — E66.9 DIABETES MELLITUS TYPE 2 IN OBESE (HCC): ICD-10-CM

## 2022-04-21 DIAGNOSIS — E11.65 UNCONTROLLED TYPE 2 DIABETES MELLITUS WITH HYPERGLYCEMIA (HCC): ICD-10-CM

## 2022-04-21 DIAGNOSIS — E11.69 DIABETES MELLITUS TYPE 2 IN OBESE (HCC): ICD-10-CM

## 2022-04-22 RX ORDER — GLIPIZIDE 5 MG/1
5 TABLET ORAL
Qty: 90 TABLET | Refills: 1 | Status: SHIPPED | OUTPATIENT
Start: 2022-04-22 | End: 2022-08-05 | Stop reason: SDUPTHER

## 2022-04-22 RX ORDER — BLOOD-GLUCOSE SENSOR
EACH MISCELLANEOUS
Qty: 9 EACH | Refills: 2 | Status: SHIPPED | OUTPATIENT
Start: 2022-04-22 | End: 2022-08-05 | Stop reason: SDUPTHER

## 2022-05-05 ENCOUNTER — OFFICE VISIT (OUTPATIENT)
Dept: FAMILY MEDICINE CLINIC | Age: 42
End: 2022-05-05
Payer: COMMERCIAL

## 2022-05-05 VITALS
RESPIRATION RATE: 16 BRPM | OXYGEN SATURATION: 95 % | TEMPERATURE: 97.8 F | HEART RATE: 88 BPM | SYSTOLIC BLOOD PRESSURE: 132 MMHG | BODY MASS INDEX: 50.02 KG/M2 | HEIGHT: 64 IN | DIASTOLIC BLOOD PRESSURE: 78 MMHG | WEIGHT: 293 LBS

## 2022-05-05 DIAGNOSIS — E11.65 UNCONTROLLED TYPE 2 DIABETES MELLITUS WITH HYPERGLYCEMIA (HCC): Primary | ICD-10-CM

## 2022-05-05 LAB — HBA1C MFR BLD HPLC: 8.1 %

## 2022-05-05 PROCEDURE — 83036 HEMOGLOBIN GLYCOSYLATED A1C: CPT | Performed by: FAMILY MEDICINE

## 2022-05-05 NOTE — PROGRESS NOTES
Pt received a while in room related to her  having a code blue after surgery at another hospital.  Her A1c was checked an much improved at 8.1%. Will plan to follow up with her in the near future.

## 2022-05-05 NOTE — PROGRESS NOTES
Chief Complaint   Patient presents with    Diabetes     Follow-up   1. Have you been to the ER, urgent care clinic since your last visit? Hospitalized since your last visit? No    2. Have you seen or consulted any other health care providers outside of the [address] [place] [address] since your last visit? Include any pap smears or colon screening.  No

## 2022-05-10 DIAGNOSIS — F43.0 ANXIETY AS ACUTE REACTION TO EXCEPTIONAL STRESS: ICD-10-CM

## 2022-05-10 DIAGNOSIS — F43.21 ADJUSTMENT DISORDER WITH DEPRESSED MOOD: Primary | ICD-10-CM

## 2022-05-10 DIAGNOSIS — F41.1 ANXIETY AS ACUTE REACTION TO EXCEPTIONAL STRESS: ICD-10-CM

## 2022-05-12 ENCOUNTER — ANCILLARY PROCEDURE (OUTPATIENT)
Dept: CARDIOLOGY CLINIC | Age: 42
End: 2022-05-12
Payer: COMMERCIAL

## 2022-05-12 VITALS
SYSTOLIC BLOOD PRESSURE: 130 MMHG | HEIGHT: 64 IN | DIASTOLIC BLOOD PRESSURE: 80 MMHG | BODY MASS INDEX: 50.02 KG/M2 | WEIGHT: 293 LBS

## 2022-05-12 DIAGNOSIS — R06.02 SOB (SHORTNESS OF BREATH): ICD-10-CM

## 2022-05-12 DIAGNOSIS — I10 BENIGN ESSENTIAL HTN: ICD-10-CM

## 2022-05-12 LAB
ECHO AO ASC DIAM: 2.6 CM
ECHO AO ASCENDING AORTA INDEX: 1.08 CM/M2
ECHO AO ROOT DIAM: 3 CM
ECHO AO ROOT INDEX: 1.25 CM/M2
ECHO AV AREA PEAK VELOCITY: 2.5 CM2
ECHO AV AREA VTI: 2.7 CM2
ECHO AV AREA/BSA PEAK VELOCITY: 1 CM2/M2
ECHO AV AREA/BSA VTI: 1.1 CM2/M2
ECHO AV MEAN GRADIENT: 4 MMHG
ECHO AV MEAN VELOCITY: 0.9 M/S
ECHO AV PEAK GRADIENT: 7 MMHG
ECHO AV PEAK VELOCITY: 1.3 M/S
ECHO AV VELOCITY RATIO: 0.69
ECHO AV VTI: 23.3 CM
ECHO EST RA PRESSURE: 3 MMHG
ECHO LA DIAMETER INDEX: 1.75 CM/M2
ECHO LA DIAMETER: 4.2 CM
ECHO LA TO AORTIC ROOT RATIO: 1.4
ECHO LA VOL 2C: 53 ML (ref 22–52)
ECHO LA VOL 4C: 55 ML (ref 22–52)
ECHO LA VOL BP: 57 ML (ref 22–52)
ECHO LA VOL/BSA BIPLANE: 24 ML/M2 (ref 16–34)
ECHO LA VOLUME AREA LENGTH: 60 ML
ECHO LA VOLUME INDEX A2C: 22 ML/M2 (ref 16–34)
ECHO LA VOLUME INDEX A4C: 23 ML/M2 (ref 16–34)
ECHO LA VOLUME INDEX AREA LENGTH: 25 ML/M2 (ref 16–34)
ECHO LV E' LATERAL VELOCITY: 8 CM/S
ECHO LV E' SEPTAL VELOCITY: 5 CM/S
ECHO LV EDV A2C: 106 ML
ECHO LV EDV A4C: 118 ML
ECHO LV EDV BP: 113 ML (ref 56–104)
ECHO LV EDV INDEX A4C: 49 ML/M2
ECHO LV EDV INDEX BP: 47 ML/M2
ECHO LV EDV NDEX A2C: 44 ML/M2
ECHO LV EJECTION FRACTION A2C: 53 %
ECHO LV EJECTION FRACTION A4C: 50 %
ECHO LV EJECTION FRACTION BIPLANE: 51 % (ref 55–100)
ECHO LV ESV A2C: 50 ML
ECHO LV ESV A4C: 59 ML
ECHO LV ESV BP: 56 ML (ref 19–49)
ECHO LV ESV INDEX A2C: 21 ML/M2
ECHO LV ESV INDEX A4C: 25 ML/M2
ECHO LV ESV INDEX BP: 23 ML/M2
ECHO LV FRACTIONAL SHORTENING: 31 % (ref 28–44)
ECHO LV INTERNAL DIMENSION DIASTOLE INDEX: 2.04 CM/M2
ECHO LV INTERNAL DIMENSION DIASTOLIC: 4.9 CM (ref 3.9–5.3)
ECHO LV INTERNAL DIMENSION SYSTOLIC INDEX: 1.42 CM/M2
ECHO LV INTERNAL DIMENSION SYSTOLIC: 3.4 CM
ECHO LV IVSD: 1.2 CM (ref 0.6–0.9)
ECHO LV MASS 2D: 226.4 G (ref 67–162)
ECHO LV MASS INDEX 2D: 94.3 G/M2 (ref 43–95)
ECHO LV POSTERIOR WALL DIASTOLIC: 1.2 CM (ref 0.6–0.9)
ECHO LV RELATIVE WALL THICKNESS RATIO: 0.49
ECHO LVOT AREA: 3.8 CM2
ECHO LVOT AV VTI INDEX: 0.71
ECHO LVOT DIAM: 2.2 CM
ECHO LVOT MEAN GRADIENT: 2 MMHG
ECHO LVOT PEAK GRADIENT: 3 MMHG
ECHO LVOT PEAK VELOCITY: 0.9 M/S
ECHO LVOT STROKE VOLUME INDEX: 26.3 ML/M2
ECHO LVOT SV: 63.1 ML
ECHO LVOT VTI: 16.6 CM
ECHO MV A VELOCITY: 0.97 M/S
ECHO MV AREA PHT: 5.7 CM2
ECHO MV E DECELERATION TIME (DT): 133.5 MS
ECHO MV E VELOCITY: 0.73 M/S
ECHO MV E/A RATIO: 0.75
ECHO MV E/E' LATERAL: 9.13
ECHO MV E/E' RATIO (AVERAGED): 11.86
ECHO MV E/E' SEPTAL: 14.6
ECHO MV PRESSURE HALF TIME (PHT): 38.7 MS
ECHO RIGHT VENTRICULAR SYSTOLIC PRESSURE (RVSP): 18 MMHG
ECHO RV FREE WALL PEAK S': 10 CM/S
ECHO RV TAPSE: 2 CM (ref 1.7–?)
ECHO TV REGURGITANT MAX VELOCITY: 1.94 M/S
ECHO TV REGURGITANT PEAK GRADIENT: 15 MMHG

## 2022-05-12 PROCEDURE — 93306 TTE W/DOPPLER COMPLETE: CPT | Performed by: STUDENT IN AN ORGANIZED HEALTH CARE EDUCATION/TRAINING PROGRAM

## 2022-05-13 RX ORDER — CLONAZEPAM 0.5 MG/1
0.5 TABLET ORAL
Qty: 60 TABLET | Refills: 1 | OUTPATIENT
Start: 2022-05-13 | End: 2022-07-07

## 2022-05-16 NOTE — PROGRESS NOTES
Please let pt know that her LV function is at the low end of normal.    Recommend coronary CTA for evaluation of dyspnea

## 2022-05-17 RX ORDER — ATENOLOL 25 MG/1
TABLET ORAL
Qty: 3 TABLET | Refills: 0 | Status: SHIPPED | OUTPATIENT
Start: 2022-05-17 | End: 2022-06-08 | Stop reason: ALTCHOICE

## 2022-05-17 NOTE — PROGRESS NOTES
Called patient ID verified X2 reviewed below results per Dr Desiree Baker. Please let pt know that her LV function is at the low end of normal.     Recommend coronary CTA for evaluation of dyspnea       Patient verbalized understanding.

## 2022-05-17 NOTE — PROGRESS NOTES
Called patient ID verified X2 reviewed below results per Dr Leeann Hooper. Please let pt know that her LV function is at the low end of normal.     Recommend coronary CTA for evaluation of dyspnea       Patient verbalized understanding.

## 2022-05-23 ENCOUNTER — VIRTUAL VISIT (OUTPATIENT)
Dept: FAMILY MEDICINE CLINIC | Age: 42
End: 2022-05-23
Payer: COMMERCIAL

## 2022-05-23 DIAGNOSIS — E66.9 DIABETES MELLITUS TYPE 2 IN OBESE (HCC): ICD-10-CM

## 2022-05-23 DIAGNOSIS — F41.1 ANXIETY AS ACUTE REACTION TO EXCEPTIONAL STRESS: ICD-10-CM

## 2022-05-23 DIAGNOSIS — E11.69 DIABETES MELLITUS TYPE 2 IN OBESE (HCC): ICD-10-CM

## 2022-05-23 DIAGNOSIS — I51.7 MILD CONCENTRIC LEFT VENTRICULAR HYPERTROPHY: ICD-10-CM

## 2022-05-23 DIAGNOSIS — F43.21 ADJUSTMENT DISORDER WITH DEPRESSED MOOD: Primary | ICD-10-CM

## 2022-05-23 DIAGNOSIS — J45.50 SEVERE PERSISTENT ASTHMA WITHOUT COMPLICATION: ICD-10-CM

## 2022-05-23 DIAGNOSIS — F43.0 ANXIETY AS ACUTE REACTION TO EXCEPTIONAL STRESS: ICD-10-CM

## 2022-05-23 DIAGNOSIS — E11.65 UNCONTROLLED TYPE 2 DIABETES MELLITUS WITH HYPERGLYCEMIA (HCC): ICD-10-CM

## 2022-05-23 PROCEDURE — 3052F HG A1C>EQUAL 8.0%<EQUAL 9.0%: CPT | Performed by: FAMILY MEDICINE

## 2022-05-23 PROCEDURE — 99213 OFFICE O/P EST LOW 20 MIN: CPT | Performed by: FAMILY MEDICINE

## 2022-05-23 RX ORDER — FLUOXETINE HYDROCHLORIDE 20 MG/1
60 CAPSULE ORAL DAILY
Qty: 270 CAPSULE | Refills: 1 | Status: SHIPPED | OUTPATIENT
Start: 2022-05-23 | End: 2022-06-08 | Stop reason: ALTCHOICE

## 2022-05-23 NOTE — PROGRESS NOTES
[facility] (: 1980) is a 38 y.o. female, established patient, here for evaluation of the following chief complaint(s):   Diabetes (Follow-up)       ASSESSMENT/PLAN:  Below is the assessment and plan developed based on review of pertinent history, labs, studies, and medications. 1. Adjustment disorder with depressed mood  -     FLUoxetine (PROzac) 20 mg capsule; Take 3 Capsules by mouth daily. , Normal, Disp-270 Capsule, R-1  2. Anxiety as acute reaction to exceptional stress  3. Uncontrolled type 2 diabetes mellitus with hyperglycemia ([facility]  4. Diabetes mellitus type 2 in obese ([facility]  5. Severe persistent asthma without complication  6. Mild concentric left ventricular hypertrophy      Return in about 4 weeks (around 2022), or if symptoms worsen or fail to improve. SUBJECTIVE/OBJECTIVE:    Since last appt, pt's partner had complications after neck surgery. There was trouble intubating and he was hypoxic for > 10 minutes. He stayed in the ICU for a long period after this. Unfortunately, his mother would not allow the patient to see him (pt is not  to her partner though they have children together). There was some relationship strife prior to this and partner's mother has placed blame on pt for the medical complications after surgery. This has been a severe stress and pt has been dealing with severe anxiety and depression since then. She reports crying spells, anhedonia, panic attacks, trouble with concentration and sleep. She is already on Prozac 40 mg daily and has been taking Klonopin BID for the past few weeks which has helped. She found out today that her partner is awake and talking and but that he is confused and thinks it is last year. She also has been working with cardiology and had a recent echo that showed EF 50-55% and some increased wall thickness c/w mild concentric hypertrophy. Sugars ct to improve - A1C down from 11.8% to 8.1% recently.     ROS per HPI    No data recorded     Physical exam:  General appearance - alert, well appearing, and in no distress  Eyes -sclera anicteric, no discharge  HEENT- normocephalic, atraumatic, moist mucous membranes, no visualized neck mass  Chest -normal respiratory effort, no visualized signs of respiratory distress  Neurological - alert, awake, normal speech, no focal findings or movement disorder noted  Psych - normal mood and affect  Skin- no apparent lesions    On this date 05/23/2022 I have spent 20 minutes reviewing previous notes, test results and face to face (virtual) with the patient discussing the diagnosis and importance of compliance with the treatment plan as well as documenting on the day of the visit. [facility], was evaluated through a synchronous (real-time) audio-video encounter. The patient (or guardian if applicable) is aware that this is a billable service, which includes applicable co-pays. Verbal consent to proceed has been obtained. The visit was conducted pursuant to the emergency declaration under the [address] [place] [address] authority and the [employer] General Act. Patient identification was verified, and a caregiver was present when appropriate. The patient was located at home in a state where the provider was licensed to provide care. An electronic signature was used to authenticate this note.   -- Elton Hernández MD

## 2022-05-23 NOTE — PROGRESS NOTES
Chief Complaint   Patient presents with    Diabetes     Follow-up   1. Have you been to the ER, urgent care clinic since your last visit? Hospitalized since your last visit? No    2. Have you seen or consulted any other health care providers outside of the [address] [place] [address] since your last visit? Include any pap smears or colon screening.  No

## 2022-05-27 DIAGNOSIS — H18.621 ACUTE HYDROPS KERATOCONUS OF RIGHT EYE: Primary | ICD-10-CM

## 2022-05-27 RX ORDER — ACETAMINOPHEN AND CODEINE PHOSPHATE 300; 30 MG/1; MG/1
1 TABLET ORAL
Qty: 30 TABLET | Refills: 0 | Status: SHIPPED | OUTPATIENT
Start: 2022-05-27 | End: 2022-06-21 | Stop reason: SDUPTHER

## 2022-06-01 ENCOUNTER — HOSPITAL ENCOUNTER (OUTPATIENT)
Age: 42
Setting detail: OBSERVATION
Discharge: HOME | End: 2022-06-02
Attending: EMERGENCY MEDICINE | Admitting: INTERNAL MEDICINE
Payer: COMMERCIAL

## 2022-06-01 DIAGNOSIS — T50.902A INTENTIONAL DRUG OVERDOSE, INITIAL ENCOUNTER (HCC): Primary | ICD-10-CM

## 2022-06-01 PROBLEM — R45.851 SUICIDAL IDEATION: Status: ACTIVE | Noted: 2022-06-01

## 2022-06-01 LAB
ALBUMIN SERPL-MCNC: 4.2 G/DL (ref 3.5–5.2)
ALBUMIN/GLOB SERPL: 1.4 {RATIO} (ref 1.1–2.2)
ALP SERPL-CCNC: 127 U/L (ref 35–104)
ALT SERPL-CCNC: 21 U/L (ref 10–35)
AMPHET UR QL SCN: NEGATIVE
ANION GAP SERPL CALC-SCNC: 13 MMOL/L (ref 5–15)
APAP SERPL-MCNC: 6 UG/ML (ref 10–30)
APAP SERPL-MCNC: <5 UG/ML (ref 10–30)
APPEARANCE UR: CLEAR
AST SERPL-CCNC: 21 U/L (ref 10–35)
ATRIAL RATE: 81 BPM
BACTERIA URNS QL MICRO: NEGATIVE /HPF
BARBITURATES UR QL SCN: NEGATIVE
BASOPHILS # BLD: 0 K/UL (ref 0–0.1)
BASOPHILS NFR BLD: 1 % (ref 0–1)
BENZODIAZ UR QL: POSITIVE
BILIRUB SERPL-MCNC: 0.4 MG/DL (ref 0.2–1)
BILIRUB UR QL: NEGATIVE
BUN SERPL-MCNC: 10 MG/DL (ref 6–20)
BUN/CREAT SERPL: 13 (ref 12–20)
CALCIUM SERPL-MCNC: 9 MG/DL (ref 8.6–10)
CALCULATED P AXIS, ECG09: 58 DEGREES
CALCULATED R AXIS, ECG10: 64 DEGREES
CALCULATED T AXIS, ECG11: 34 DEGREES
CANNABINOIDS UR QL SCN: POSITIVE
CHLORIDE SERPL-SCNC: 103 MMOL/L (ref 98–107)
CHOLEST SERPL-MCNC: 124 MG/DL
CO2 SERPL-SCNC: 22 MMOL/L (ref 22–29)
COCAINE UR QL SCN: NEGATIVE
COLOR UR: ABNORMAL
COVID-19 RAPID TEST, COVR: NOT DETECTED
CREAT SERPL-MCNC: 0.76 MG/DL (ref 0.5–0.9)
DIAGNOSIS, 93000: NORMAL
DIFFERENTIAL METHOD BLD: NORMAL
DRUG SCRN COMMENT,DRGCM: ABNORMAL
EOSINOPHIL # BLD: 0.1 K/UL (ref 0–0.4)
EOSINOPHIL NFR BLD: 2 % (ref 0–7)
EPITH CASTS URNS QL MICRO: ABNORMAL /LPF
ERYTHROCYTE [DISTWIDTH] IN BLOOD BY AUTOMATED COUNT: 14.1 % (ref 11.5–14.5)
EST. AVERAGE GLUCOSE BLD GHB EST-MCNC: 157 MG/DL
ETHANOL SERPL-MCNC: 39 MG/DL (ref 0–10)
GLOBULIN SER CALC-MCNC: 3 G/DL (ref 2–4)
GLUCOSE BLD STRIP.AUTO-MCNC: 119 MG/DL (ref 65–117)
GLUCOSE BLD STRIP.AUTO-MCNC: 132 MG/DL (ref 65–117)
GLUCOSE SERPL-MCNC: 147 MG/DL (ref 65–100)
GLUCOSE UR STRIP.AUTO-MCNC: NEGATIVE MG/DL
HAV IGM SER QL: NONREACTIVE
HBA1C MFR BLD: 7.1 % (ref 4–5.6)
HBV CORE IGM SER QL: NONREACTIVE
HBV SURFACE AG SER QL: <0.1 INDEX
HBV SURFACE AG SER QL: NEGATIVE
HCG UR QL: NEGATIVE
HCT VFR BLD AUTO: 40 % (ref 35–47)
HCV AB SERPL QL IA: NONREACTIVE
HDLC SERPL-MCNC: 45 MG/DL
HDLC SERPL: 2.8 {RATIO} (ref 0–5)
HGB BLD-MCNC: 13.5 G/DL (ref 11.5–16)
HGB UR QL STRIP: NEGATIVE
IMM GRANULOCYTES # BLD AUTO: 0 K/UL (ref 0–0.04)
IMM GRANULOCYTES NFR BLD AUTO: 0 % (ref 0–0.5)
KETONES UR QL STRIP.AUTO: ABNORMAL MG/DL
LDLC SERPL CALC-MCNC: 51 MG/DL (ref 0–100)
LEUKOCYTE ESTERASE UR QL STRIP.AUTO: NEGATIVE
LYMPHOCYTES # BLD: 2.1 K/UL (ref 0.8–3.5)
LYMPHOCYTES NFR BLD: 30 % (ref 12–49)
MCH RBC QN AUTO: 29.5 PG (ref 26–34)
MCHC RBC AUTO-ENTMCNC: 33.8 G/DL (ref 30–36.5)
MCV RBC AUTO: 87.5 FL (ref 80–99)
METHADONE UR QL: NEGATIVE
MONOCYTES # BLD: 0.5 K/UL (ref 0–1)
MONOCYTES NFR BLD: 7 % (ref 5–13)
NEUTS SEG # BLD: 4.3 K/UL (ref 1.8–8)
NEUTS SEG NFR BLD: 61 % (ref 32–75)
NITRITE UR QL STRIP.AUTO: NEGATIVE
NRBC # BLD: 0 K/UL (ref 0–0.01)
NRBC BLD-RTO: 0 PER 100 WBC
OPIATES UR QL: POSITIVE
P-R INTERVAL, ECG05: 152 MS
PCP UR QL: NEGATIVE
PH UR STRIP: 5.5 [PH] (ref 5–8)
PLATELET # BLD AUTO: 304 K/UL (ref 150–400)
PMV BLD AUTO: 10.1 FL (ref 8.9–12.9)
POTASSIUM SERPL-SCNC: 3.9 MMOL/L (ref 3.5–5.1)
PROCALCITONIN SERPL-MCNC: <0.05 NG/ML
PROT SERPL-MCNC: 7.2 G/DL (ref 6.4–8.3)
PROT UR STRIP-MCNC: NEGATIVE MG/DL
Q-T INTERVAL, ECG07: 406 MS
QRS DURATION, ECG06: 72 MS
QTC CALCULATION (BEZET), ECG08: 471 MS
RBC # BLD AUTO: 4.57 M/UL (ref 3.8–5.2)
RBC #/AREA URNS HPF: ABNORMAL /HPF
SALICYLATES SERPL-MCNC: <0.3 MG/DL (ref 3–10)
SERVICE CMNT-IMP: ABNORMAL
SERVICE CMNT-IMP: ABNORMAL
SODIUM SERPL-SCNC: 138 MMOL/L (ref 136–145)
SOURCE, COVRS: NORMAL
SP GR UR REFRACTOMETRY: 1.02 (ref 1–1.03)
SP1: NORMAL
SP2: NORMAL
SP3: NORMAL
TRIGL SERPL-MCNC: 140 MG/DL (ref ?–150)
UROBILINOGEN UR QL STRIP.AUTO: 0.2 EU/DL
VENTRICULAR RATE, ECG03: 81 BPM
VLDLC SERPL CALC-MCNC: 28 MG/DL
WBC # BLD AUTO: 7.1 K/UL (ref 3.6–11)
WBC URNS QL MICRO: ABNORMAL /HPF (ref 0–4)

## 2022-06-01 PROCEDURE — 81025 URINE PREGNANCY TEST: CPT

## 2022-06-01 PROCEDURE — G0378 HOSPITAL OBSERVATION PER HR: HCPCS

## 2022-06-01 PROCEDURE — 80143 DRUG ASSAY ACETAMINOPHEN: CPT

## 2022-06-01 PROCEDURE — 85025 COMPLETE CBC W/AUTO DIFF WBC: CPT

## 2022-06-01 PROCEDURE — 74011250636 HC RX REV CODE- 250/636: Performed by: INTERNAL MEDICINE

## 2022-06-01 PROCEDURE — 96372 THER/PROPH/DIAG INJ SC/IM: CPT

## 2022-06-01 PROCEDURE — 82962 GLUCOSE BLOOD TEST: CPT

## 2022-06-01 PROCEDURE — 94640 AIRWAY INHALATION TREATMENT: CPT

## 2022-06-01 PROCEDURE — 65270000029 HC RM PRIVATE

## 2022-06-01 PROCEDURE — 94761 N-INVAS EAR/PLS OXIMETRY MLT: CPT

## 2022-06-01 PROCEDURE — 81001 URINALYSIS AUTO W/SCOPE: CPT

## 2022-06-01 PROCEDURE — 80053 COMPREHEN METABOLIC PANEL: CPT

## 2022-06-01 PROCEDURE — 87635 SARS-COV-2 COVID-19 AMP PRB: CPT

## 2022-06-01 PROCEDURE — 80074 ACUTE HEPATITIS PANEL: CPT

## 2022-06-01 PROCEDURE — 83036 HEMOGLOBIN GLYCOSYLATED A1C: CPT

## 2022-06-01 PROCEDURE — 74011000250 HC RX REV CODE- 250: Performed by: INTERNAL MEDICINE

## 2022-06-01 PROCEDURE — 80307 DRUG TEST PRSMV CHEM ANLYZR: CPT

## 2022-06-01 PROCEDURE — 82077 ASSAY SPEC XCP UR&BREATH IA: CPT

## 2022-06-01 PROCEDURE — 96374 THER/PROPH/DIAG INJ IV PUSH: CPT

## 2022-06-01 PROCEDURE — 99285 EMERGENCY DEPT VISIT HI MDM: CPT

## 2022-06-01 PROCEDURE — 84145 PROCALCITONIN (PCT): CPT

## 2022-06-01 PROCEDURE — 80179 DRUG ASSAY SALICYLATE: CPT

## 2022-06-01 PROCEDURE — 74011250636 HC RX REV CODE- 250/636: Performed by: EMERGENCY MEDICINE

## 2022-06-01 PROCEDURE — 77010033678 HC OXYGEN DAILY

## 2022-06-01 PROCEDURE — 80061 LIPID PANEL: CPT

## 2022-06-01 PROCEDURE — 94664 DEMO&/EVAL PT USE INHALER: CPT

## 2022-06-01 PROCEDURE — 36415 COLL VENOUS BLD VENIPUNCTURE: CPT

## 2022-06-01 PROCEDURE — 93005 ELECTROCARDIOGRAM TRACING: CPT

## 2022-06-01 RX ORDER — ATORVASTATIN CALCIUM 20 MG/1
20 TABLET, FILM COATED ORAL DAILY
Status: DISCONTINUED | OUTPATIENT
Start: 2022-06-02 | End: 2022-06-03 | Stop reason: HOSPADM

## 2022-06-01 RX ORDER — BUDESONIDE 0.5 MG/2ML
500 INHALANT ORAL
Status: DISCONTINUED | OUTPATIENT
Start: 2022-06-01 | End: 2022-06-01

## 2022-06-01 RX ORDER — ACETAMINOPHEN 325 MG/1
650 TABLET ORAL
Status: DISCONTINUED | OUTPATIENT
Start: 2022-06-01 | End: 2022-06-03 | Stop reason: HOSPADM

## 2022-06-01 RX ORDER — AMLODIPINE BESYLATE 5 MG/1
5 TABLET ORAL DAILY
Status: DISCONTINUED | OUTPATIENT
Start: 2022-06-02 | End: 2022-06-02

## 2022-06-01 RX ORDER — INSULIN LISPRO 100 [IU]/ML
INJECTION, SOLUTION INTRAVENOUS; SUBCUTANEOUS
Status: DISCONTINUED | OUTPATIENT
Start: 2022-06-01 | End: 2022-06-03 | Stop reason: HOSPADM

## 2022-06-01 RX ORDER — ONDANSETRON 4 MG/1
4 TABLET, ORALLY DISINTEGRATING ORAL
Status: DISCONTINUED | OUTPATIENT
Start: 2022-06-01 | End: 2022-06-03 | Stop reason: HOSPADM

## 2022-06-01 RX ORDER — BUDESONIDE 0.5 MG/2ML
500 INHALANT ORAL
Status: DISCONTINUED | OUTPATIENT
Start: 2022-06-01 | End: 2022-06-02

## 2022-06-01 RX ORDER — HYDRALAZINE HYDROCHLORIDE 20 MG/ML
10 INJECTION INTRAMUSCULAR; INTRAVENOUS ONCE
Status: COMPLETED | OUTPATIENT
Start: 2022-06-01 | End: 2022-06-01

## 2022-06-01 RX ORDER — ONDANSETRON 2 MG/ML
4 INJECTION INTRAMUSCULAR; INTRAVENOUS
Status: DISCONTINUED | OUTPATIENT
Start: 2022-06-01 | End: 2022-06-02

## 2022-06-01 RX ORDER — MELOXICAM 7.5 MG/1
15 TABLET ORAL
Status: DISCONTINUED | OUTPATIENT
Start: 2022-06-01 | End: 2022-06-03 | Stop reason: HOSPADM

## 2022-06-01 RX ORDER — ENOXAPARIN SODIUM 100 MG/ML
40 INJECTION SUBCUTANEOUS EVERY 12 HOURS
Status: DISCONTINUED | OUTPATIENT
Start: 2022-06-01 | End: 2022-06-03 | Stop reason: HOSPADM

## 2022-06-01 RX ORDER — ATENOLOL 25 MG/1
25 TABLET ORAL DAILY
Status: DISCONTINUED | OUTPATIENT
Start: 2022-06-02 | End: 2022-06-01

## 2022-06-01 RX ORDER — MAGNESIUM SULFATE 100 %
4 CRYSTALS MISCELLANEOUS AS NEEDED
Status: DISCONTINUED | OUTPATIENT
Start: 2022-06-01 | End: 2022-06-03 | Stop reason: HOSPADM

## 2022-06-01 RX ORDER — DEXTROSE MONOHYDRATE 100 MG/ML
0-250 INJECTION, SOLUTION INTRAVENOUS AS NEEDED
Status: DISCONTINUED | OUTPATIENT
Start: 2022-06-01 | End: 2022-06-03 | Stop reason: HOSPADM

## 2022-06-01 RX ORDER — IPRATROPIUM BROMIDE AND ALBUTEROL SULFATE 2.5; .5 MG/3ML; MG/3ML
3 SOLUTION RESPIRATORY (INHALATION)
Status: DISCONTINUED | OUTPATIENT
Start: 2022-06-01 | End: 2022-06-03 | Stop reason: HOSPADM

## 2022-06-01 RX ORDER — POLYETHYLENE GLYCOL 3350 17 G/17G
17 POWDER, FOR SOLUTION ORAL DAILY PRN
Status: DISCONTINUED | OUTPATIENT
Start: 2022-06-01 | End: 2022-06-03 | Stop reason: HOSPADM

## 2022-06-01 RX ORDER — SODIUM CHLORIDE 9 MG/ML
75 INJECTION, SOLUTION INTRAVENOUS CONTINUOUS
Status: DISCONTINUED | OUTPATIENT
Start: 2022-06-01 | End: 2022-06-02

## 2022-06-01 RX ORDER — CYCLOBENZAPRINE HCL 10 MG
5 TABLET ORAL
Status: DISCONTINUED | OUTPATIENT
Start: 2022-06-01 | End: 2022-06-03 | Stop reason: HOSPADM

## 2022-06-01 RX ORDER — ENOXAPARIN SODIUM 100 MG/ML
40 INJECTION SUBCUTANEOUS DAILY
Status: DISCONTINUED | OUTPATIENT
Start: 2022-06-02 | End: 2022-06-01 | Stop reason: DRUGHIGH

## 2022-06-01 RX ORDER — ARFORMOTEROL TARTRATE 15 UG/2ML
15 SOLUTION RESPIRATORY (INHALATION)
Status: DISCONTINUED | OUTPATIENT
Start: 2022-06-01 | End: 2022-06-01

## 2022-06-01 RX ORDER — MONTELUKAST SODIUM 10 MG/1
10 TABLET ORAL DAILY
Status: DISCONTINUED | OUTPATIENT
Start: 2022-06-02 | End: 2022-06-03 | Stop reason: HOSPADM

## 2022-06-01 RX ORDER — PANTOPRAZOLE SODIUM 40 MG/1
40 TABLET, DELAYED RELEASE ORAL DAILY
Status: DISCONTINUED | OUTPATIENT
Start: 2022-06-02 | End: 2022-06-03 | Stop reason: HOSPADM

## 2022-06-01 RX ORDER — ACETAMINOPHEN 650 MG/1
650 SUPPOSITORY RECTAL
Status: DISCONTINUED | OUTPATIENT
Start: 2022-06-01 | End: 2022-06-02

## 2022-06-01 RX ORDER — ARFORMOTEROL TARTRATE 15 UG/2ML
15 SOLUTION RESPIRATORY (INHALATION)
Status: DISCONTINUED | OUTPATIENT
Start: 2022-06-01 | End: 2022-06-02

## 2022-06-01 RX ORDER — ASPIRIN 81 MG/1
81 TABLET ORAL DAILY
Status: DISCONTINUED | OUTPATIENT
Start: 2022-06-02 | End: 2022-06-03 | Stop reason: HOSPADM

## 2022-06-01 RX ADMIN — BUDESONIDE 500 MCG: 0.5 SUSPENSION RESPIRATORY (INHALATION) at 20:15

## 2022-06-01 RX ADMIN — ENOXAPARIN SODIUM 40 MG: 100 INJECTION SUBCUTANEOUS at 21:58

## 2022-06-01 RX ADMIN — ARFORMOTEROL TARTRATE 15 MCG: 15 SOLUTION RESPIRATORY (INHALATION) at 20:15

## 2022-06-01 RX ADMIN — SODIUM CHLORIDE 1000 ML: 9 INJECTION, SOLUTION INTRAVENOUS at 09:32

## 2022-06-01 RX ADMIN — HYDRALAZINE HYDROCHLORIDE 10 MG: 20 INJECTION INTRAMUSCULAR; INTRAVENOUS at 23:14

## 2022-06-01 NOTE — H&P
[address]  [phone]    Hospitalist Admission Note      NAME:  Fortino Villafana   :   1980   MRN:  [id number]     PCP:  Miladis Hackett MD     Date/Time of service:  2022 2:04 PM          Subjective:     CHIEF COMPLAINT: overdose     HISTORY OF PRESENT ILLNESS:     Ms. Caro Felix is a 38 y.o.  female who presented to the Emergency Department complaining of overdose. She intentionally took alcohol, clonazepam, percocet and fluoxetine. Psych has interveiwed her in ER and determines she needs a psych admit. We were asked to admit the patient. Past Medical History:   Diagnosis Date    Alcohol abuse     Anxiety and depression     Asthma     DM [facility]diabetes mellitus), type 2 ([facility]     Morbid obesity ([facility] 10/28/2014        Past Surgical History:   Procedure Laterality Date    HX GYN      3 C-sections    HX GYN      Miscarriage       Social History     Tobacco Use    Smoking status: Former Smoker     Packs/day: 0.10     Types: Cigarettes     Start date: 2014     Quit date: 2016     Years since quittin.4    Smokeless tobacco: Never Used   Substance Use Topics    Alcohol use:  Yes     Alcohol/week: 0.0 standard drinks     Comment: last  drink was 1year ago        Family History   Problem Relation Age of Onset    Hypertension Maternal Aunt     Hypertension Maternal Uncle     Thyroid Disease Maternal Uncle     Hypertension Maternal Grandmother     Cancer Maternal Grandfather     Hypertension Maternal Grandfather     Diabetes Paternal Grandmother     Cancer Paternal Grandmother     Hypertension Mother     Alcohol abuse Father     Substance Abuse Father     Hypertension Maternal Aunt     Hypertension Maternal Uncle     Stroke Maternal Uncle     Hypertension Maternal Uncle     Alcohol abuse Maternal Uncle     Hypertension Maternal Uncle     Hypertension Maternal Uncle     Arrhythmia Maternal Uncle       Family hx cannot be fully assessed, since the patient cannot provide information    Allergies   Allergen Reactions    Metformin Other (comments)     Hair loss and unstable blood sugars    Pcn [Penicillins] Swelling        Prior to Admission medications    Medication Sig Start Date End Date Taking? Authorizing Provider   acetaminophen-codeine (Tylenol-Codeine #3) 300-30 mg per tablet Take 1 Tablet by mouth every four (4) hours as needed for Pain for up to 5 days. Max Daily Amount: 6 Tablets. 5/27/22 6/1/22  Jakub Zaldivar MD   FLUoxetine (PROzac) 20 mg capsule Take 3 Capsules by mouth daily. 5/23/22   Jakub Zaldivar MD   atenoloL (TENORMIN) 25 mg tablet Take 2 TABS the night before and 1 TAB the morning of the test 5/17/22   Swapna GALLEGOS DO   clonazePAM (KlonoPIN) 0.5 mg tablet Take 1 Tablet by mouth two (2) times daily as needed for Anxiety. Max Daily Amount: 1 mg. 5/13/22   Jakub Zaldivar MD   Blood-Glucose Sensor (Dexcom G6 Sensor) ta Check sugars 5-6 x per day. Needs filled early due to problem with sensor recently 4/22/22   Jakub Zaldivar MD   glipiZIDE (GLUCOTROL) 5 mg tablet Take 1 Tablet by mouth Daily (before dinner). 4/22/22   Jakub Zaldivar MD   cyclobenzaprine (FLEXERIL) 5 mg tablet Take 1 Tablet by mouth nightly. 3/30/22   Jakub Zaldivar MD   meloxicam (MOBIC) 15 mg tablet Take 1 Tablet by mouth daily (after breakfast). Take x 1 week and then stop. May use daily after this as needed. 3/30/22   Jakub Zaldivar MD   dulaglutide (TRULICITY) 1.5 FG/9.1 mL sub-q pen 0.5 mL by SubCUTAneous route every seven (7) days. Use zero copay voucher pt will bring for 4 pens please.  3/23/22   Jakub Zaldivar MD   insulin nph-regular human rec (HUMULIN 70-30) 100 unit/mL (70-30) inpn 70 units before breakfast and before dinner - dose updated on chart 3/9/22   Jakub Zaldivar MD   Blood-Glucose Transmitter (Dexcom G6 Transmitter) ta Check sugars 5-6 x per day 3/9/22   Rama Prakash MD   pantoprazole (PROTONIX) 40 mg tablet Take 1 Tablet by mouth daily. 12/7/21   Rama Prakash MD   montelukast (SINGULAIR) 10 mg tablet Take 1 Tablet by mouth daily. 12/7/21   Rama Prakash MD   HumaLOG U-100 Insulin 100 unit/mL injection Take 25 units before lunch and sliding scale at bedtime as directed 12/7/21   Rama Prakash MD   amLODIPine (NORVASC) 5 mg tablet Take 1 Tablet by mouth daily. Take 1 Tab by mouth daily. 12/7/21   Rama Prakash MD   albuterol (PROVENTIL HFA, VENTOLIN HFA, PROAIR HFA) 90 mcg/actuation inhaler Take 2 Puffs by inhalation every four (4) hours as needed for Wheezing. 12/7/21   Rama Prakash MD   atorvastatin (LIPITOR) 20 mg tablet Take 1 Tablet by mouth daily. 12/7/21   Rama Prakash MD   losartan (COZAAR) 50 mg tablet Take 1 Tablet by mouth daily. Increased dose 8/16/21   Rama Prakash MD   fluticasone propion-salmeteroL (Advair Diskus) 500-50 mcg/dose diskus inhaler Take 1 Puff by inhalation two (2) times a day. 8/16/21   Rama Prakash MD   tiotropium bromide (SPIRIVA RESPIMAT) 2.5 mcg/actuation inhaler Take 2 Puffs by inhalation daily. 5/12/21   Rama Prakash MD   budesonide (PULMICORT) 1 mg/2 mL nbsp 2 mL by Nebulization route two (2) times daily as needed for Cough. Do not use on days when using Advair 5/12/21   Rama Prakash MD   magnesium oxide (MAG-OX) 400 mg tablet Take 1 Tab by mouth daily. 5/12/21   Rama Prakash MD   zinc sulfate (ZINC-220 PO) Take  by mouth daily. Provider, Historical   cetirizine (ZYRTEC) 10 mg tablet Take 10 mg by mouth daily as needed for Allergies. Provider, Historical   TRUEplus Insulin 1 mL 31 gauge x 5/16 syrg Use with insulin 12/17/20   Rama Prakash MD   aspirin delayed-release 81 mg tablet Take 81 mg by mouth daily.     Provider, Historical   albuterol (PROVENTIL VENTOLIN) 2.5 mg /3 mL (0.083 %) nebu  10/21/20   Provider, Historical   ascorbic acid, vitamin C, (Vitamin C) 500 mg tablet Take 500 mg by mouth daily. Provider, Historical   hydroquinone (ESOTERICA, MELQUIN) 4 % topical cream Apply  to affected area two (2) times a day. Patient not taking: Reported on 10/26/2021 3/3/20   Bernadette Avery MD   cranberry extract 450 mg tab tablet Take 450 mg by mouth daily.     Provider, Historical       Review of Systems:  (bold if positive, if negative)    Gen:  Eyes:  ENT:  CVS:  Pulm:  GI:  :  MS:  Skin:  Psych:  depression, anxiety, suicidal ideationEndo:  Hem:  Renal:  Neuro:        Objective:      VITALS:    Vital signs reviewed; most recent are:    Visit Vitals  BP (!) 146/75 (BP 1 Location: Left upper arm, BP Patient Position: At rest)   Pulse 69   Temp 98.4 °F (36.9 °C)   Resp 14   Ht 5' 4\" (1.626 m)   Wt 151.1 kg (333 lb 1.8 oz)   SpO2 97%   BMI 57.18 kg/m²     SpO2 Readings from Last 6 Encounters:   06/01/22 97%   05/05/22 95%   04/14/22 97%   01/29/22 98%   05/12/21 95%   03/03/20 98%    O2 Flow Rate (L/min): 2 l/min   No intake or output data in the 24 hours ending 06/01/22 1142     Exam:     Physical Exam:    Gen:  Morbid obese, in acute emotional distress  HEENT:  Pink conjunctivae, PERRL, hearing intact to voice, moist mucous membranes  Neck:  Supple, without masses, thyroid non-tender  Resp:  No accessory muscle use, clear breath sounds without wheezes rales or rhonchi  Card:  No murmurs, normal S1, S2 without thrills, bruits or peripheral edema  Abd:  Soft, non-tender, non-distended, normoactive bowel sounds are present, no mass  Lymph:  No cervical or inguinal adenopathy  Musc:  No cyanosis or clubbing  Skin:  No rashes or ulcers, skin turgor is good  Neuro:  Cranial nerves are grossly intact, no focal motor weakness, follows commands   Psych:  Poor insight, oriented to person, place and time, tearful     Labs:    Recent Labs     06/01/22  0936   WBC 7.1   HGB 13.5   HCT 40.0        Recent Labs     06/01/22  0936      K 3.9    CO2 22   *   BUN 10   CREA 0.76   CA 9.0   ALB 4.2   TBILI 0.4   ALT 21     Lab Results   Component Value Date/Time    Glucose (POC) 86 07/18/2016 12:06 PM    Glucose (POC) 74 07/18/2016 11:53 AM     No results for input(s): PH, PCO2, PO2, HCO3, FIO2 in the last 72 hours. No results for input(s): INR, INREXT in the last 72 hours. All Micro Results     Procedure Component Value Units Date/Time    CULTURE, URINE [[id number]]     Order Status: Sent Specimen: Urine from Clean catch     COVID-19 RAPID TEST [[id number]]     Order Status: Sent           I have reviewed previous records       Assessment and Plan:      Overdose, intentional self-harm, initial encounter / Anxiety and depression / Suicidal ideation - POA, severely depressed. Appreciate psych input and I agree with their plan to admit to inpatient psych. CM consulted to arrange it. She is medically stable for discharge: Awake, no bradycardia, no hypotension, no hypoxia, normal QTc, undetectable tylenol, undetectable ASA, BAL low, Drug screen with THC, opiates and benzo, as anticipated. DM (diabetes mellitus), type 2 - Diabetic diet and counseling. SSI per protocol. Continue home NPH but for now hold glipizide and trulicity. Check A1c. Morbid obesity - Advise weight loss and outpatient KIMBERLY testing. Hypertension - Continue atenolol, losartan and Norvasc    Chronic pain - Hold percocet. Continue flexeril and NSAIDS. Hyperlipidemia - Continue atorvastatin    Asthma - Continue Brovana and pulmicort, in place of Advair, and Prn duonebs. More likely her wheezing is related to obesity and GERD    GERD - PPI    Alcohol abuse - Advise cessation. Start Goody vitamins. Low risk of withdrawal I       Telemetry reviewed:   normal sinus rhythm    Risk of deterioration: high      Total time spent with patient: 48 Minutes I personally reviewed chart, notes, data and current medications in the medical record.   I have personally examined and treated the patient at bedside during this period. To assist coordination of care and communication with nursing and staff, this note may be preliminary early in the day, but finalized by end of the day.                  Care Plan discussed with: Patient, Care Manager, Nursing Staff, Consultant/Specialist and >50% of time spent in counseling and coordination of care    Discussed:  Care Plan and D/C Planning       ___________________________________________________    Attending Physician: Genna Guan MD

## 2022-06-01 NOTE — ED TRIAGE NOTES
Pt presents to ED from home via ems for suicidal attempt by overdosing on \"a bunch of 0.5mg clonazepam pills\", 4 20mg fluoxetine pills and 4 percocet 300mg/30mg pills 1 hour PTA. Pt also admits to drinking 3/4 of large bottle of tequila and one shooter bottle of tequila this morning. Pt reports SI at this time. Denies HI. Pt lethargic upon arrival, arousable to voice at this time. Following commands, able to answer questions appropriately.

## 2022-06-01 NOTE — ED NOTES
This RN spoke to Kiya at [facility],[address] [place] [address]. As per Kiya, supportive care measures are indicated at this time. Kiya requesting ekg, urine tox screen, salicilayte level, BMP, liver function tests, magnesium level, pregnancy test and a 4 hour tylenol level at 1130am. Dr Rosalva Sebastian made aware of requests.

## 2022-06-01 NOTE — BSMART NOTE
Comprehensive Assessment Form Part 1      Section I - Disposition    Axis I - Depression, Adjustment Disorder, Alcohol Use Disorder   Axis II - Deferred  Axis III -   Asthma [J45.909]     Diabetes ([facility] [E11.9]     Morbid obesity ([facility] [E66.01] 10/28/2014    Diabetes mellitus type 2 in obese -- Diet controlled [E11.69, E66.9] 10/28/2014      The Medical Doctor to Psychiatrist conference was completed. The Medical Doctor is in agreement with Psychiatrist disposition because of (reason) N/A. The plan is to medically clear and present for inpatient psychiatric treatment. The on-call Psychiatrist consulted was Dr. Avery Ellis. The admitting Psychiatrist will be Dr. Zhane Mann. The admitting Diagnosis is MDD. The Payor source is [employer]. This writer [facility] flow sheet and the risk level assigned is high. Based on this assessment, the risk of suicide is high and the plan is to present for inpatient treatment after medical clearance. Section II - Integrated Summary  Summary:  Patient is a 28-year-old female brought to the ER by EMS with the following triage complaint:          Pt presents to ED from home via ems for suicidal attempt by overdosing on \"a bunch of 0.5mg clonazepam pills\", 4 20mg fluoxetine pills and 4 percocet 300mg/30mg pills 1 hour PTA. Pt also admits to drinking 3/4 of large bottle of tequila and one shooter bottle of tequila this morning. Pt reports SI at this time. Denies HI. Pt lethargic upon arrival, arousable to voice at this time. Following commands, able to answer questions appropriately. Patient evaluated via tele-[facility], with telephone used in conjunction with computer because of poor audio connection. Patient presented as alert, responsive, but somewhat slowed, likely as a result of OD, alcohol.   She was willing and able to answer questions,but became so tearful that she had difficulty speaking for more than a few seconds at a time. Patient states that her harley, with whom she has three children, suffered a complication during a recent spinal surgery, and  for 14 minutes. Revived, he now has to learn how to walk, eat, and function again. Patient expresses that she feels guilty because if she'd been healthier (see below), he wouldn't have had to work so hard and \"would have taken better care of himself. \"  Patient reports having asthma that gets so bad that, at times, she can barely walk the six steps to her bathroom at home, and is unable to work. She also reports needing a cornea transplant in her right eye, rendering her unable to drive, more dependent upon harley. Patient states that her guilt is compounded because harley's mother blames her for causing his health problems and has cut her off from all communication with him, including phone-location capabilities the family previously used. Patient states that she believes her harley remains at [facility], but doesn't know and feels awful about it. She adds that she is unable to sleep in the bed without him, and has to drink from the time she wakes up to get any sleep. She reports that she can't remember the last time she ate. Patient reports that this is her first suicide attempt, but that when she took the pills and alcohol she \"wanted to die. \"  She was unable or unwilling to state what would happen if she went home. Patient appears to be having difficulty coping with both life and acute stressors, and appears to have little support, no MH treatment. Presented with the recommendation of inpatient psychiatric treatment, patient agreed, tearfully. Dr. Katie Bailey, ER provider, expresses agreement that inpatient treatment is necessary once patient is medically clear. As of this writing, it is uncertain if patient will be cleared medically today.   A TDO evaluation is emphatically recommended if patient becomes involuntary, as inpatient level of treatment is considered necessary to ensure safety and stabilize mood. The patient has demonstrated mental capacity to provide informed consent. The information is given by the patient. The Chief Complaint is depression, SI. The Precipitant Factors are medical and relationship stressors. Previous Hospitalizations: denies. The patient has not previously been in restraints. Current Psychiatrist and/or  is NA. Lethality Assessment:    The potential for suicide noted by the following: intent, defined plan, current attempt, ideation, means and current substance abuse . The potential for homicide is not noted. The patient has not been a perpetrator of sexual or physical abuse. There are not pending charges. The patient is felt to be at risk for self harm or harm to others. The attending nurse was advised the patient is at risk for self harm. Section III - Psychosocial  The patient's overall mood and attitude is sad. Feelings of helplessness and hopelessness are observed by patient statement and presentation. Generalized anxiety is observed by patient statement and presentation. Panic is not observed. Phobias are not observed. Obsessive compulsive tendencies are not observed. Section IV - Mental Status Exam  The patient's appearance shows no evidence of impairment. The patient's behavior shows no evidence of impairment. The patient is oriented to time, place, person and situation. The patient's speech shows no evidence of impairment. The patient's mood is depressed and is sad. The range of affect is constricted. The patient's thought content demonstrates no evidence of impairment. The thought process shows no evidence of impairment. The patient's perception shows no evidence of impairment. The patient's memory shows no evidence of impairment. The patient's appetite is decreased and shows signs of weight loss. The patient's sleep has evidence of insomnia.  The patient's insight is self-blaming. The patient's judgement is psychologically impaired. Section V - Substance Abuse  The patient is using substances. The patient is using alcohol. Section VI - Living Arrangements  The patient has a significant other. The patient lives with a significant other. The patient has three children. The patient does plan to return home upon discharge. The patient does not have legal issues pending. The patient's source of income comes from family. Jain and cultural practices have not been voiced at this time. The patient's greatest support comes from [facility] and this person will not be involved with the treatment. The patient has been in an event described as horrible or outside the realm of ordinary life experience either currently or in the past.  The patient has not been a victim of sexual/physical abuse. Section VII - Other Areas of Clinical Concern  The highest grade achieved is unknown with the overall quality of school experience being described as unknown. The patient is currently unemployed and speaks [place] as a primary language. The patient has no communication impairments affecting communication. The patient's preference for learning can be described as: can read and write adequately. The patient's hearing is normal.  The patient's vision is normal.      Oni Craig LCSW.

## 2022-06-01 NOTE — PROGRESS NOTES
6/1/2022 4:34 PM   Case management consult for psych placement received. CM called referral into [facility] inpatient psych, spoke with Pato Siddiqui. Pato Siddiqui reported pt has been approved for admission but they do not have any beds available at this time. Pato Siddiqui reported pt is on their wait list.    Care Management Assessment      Reason for Admission: Emergency      ICD-10-CM ICD-9-CM    1. [facility] drug [facility]  T50.902A 977.9      E950.5        Assessment:   [x]In person with pt   Charted address and phone numbers confirmed. RUR: 8%  Risk Level: [x]Low []Moderate []High  Value-based purchasing: [x] Yes [] No  Bundle patient: [] Yes [x] No   Specify:     Advance Directive: Full Code. [x] No AD on file. [] AD on file. [] Current AD not on file. Copy requested. [] Requests AD, and referral submitted to [facility]. Healthcare Decision Maker:         Assessment:    Age: 38    Sex: [] Male [x]Female     Residency: [x]Private residence     Lives With: [x]3 children at home    Prior functioning:  [x]Independent with ADLs and iADLS []Dependent with ADLs and iADLs []Partial dependence, Specify:     Prior DME required:  []None []RW []Cane []Crutches []Bedside commode []CPAP []Home O2 (Liter/Provider: ) [x]Nebulizer   []Shower Chair []Wheelchair []Hospital Bed []Batsheva []Stair lift []Rollator []Other:    DME available: []None [x]RW []Cane []Crutches []Bedside commode []CPAP []Home O2 (Liter/Provider: ) []Nebulizer   []Shower Chair []Wheelchair []Hospital Bed []Batsheva []Stair lift []Rollator []Other:    Rehab history: []None [x]Outpatient PT [x]Home Health ([address] [place] [address]) []SNF (Provider/Date: ) []IPR (Provider/Date: ) []LTC (Provider/Date: ) []Hospice (Provider/Date: )  []Other:     Discharge Concerns: [x]Yes []No []Unknown   Describe: BSMART recommending inpatient psych placement.  Pt politely declining psych placement reported she plans to discharge home with her mother, Carrington Kothari who was previously a psych nurse. Pt reported she has great family support at her mother's home. Insurer:   Insurance Information                [employer] Phone: --    Subscriber: Adriana Bajwa Subscriber#: [id number]    Group#: [id number] Precert#: --          PCP: Almita John   Address: [place] [address]   Phone number: [phone]   Current patient: [x]Yes []No   Approximate date of last visit: May 5th    Access to virtual PCP visits: [x]Yes []No    Pharmacy:  [facility] Transport: TBD       Transition of care plan:    [x]Unable to determine at this time. Awaiting clinical progress, and disposition recommendations. Pt requesting to discharge home when able. Pt is aware psych placement is being recommended at this time. Pt has no history of inpatient nor outpatient psych. Pt is aware of the TDO process but does not feel she needs inpatient psych at this time. Pt reported she is feeling better now. CM requested psych eval from pt's attending to re-evaluate pt. CM also requested a COVID PCR from pt's attending. LESLEY Beaver  Care Management Interventions  PCP Verified by CM:  Yes  Transition of Care Consult (CM Consult): DME/Supply Assistance  [place] Signup: No  Discharge Durable Medical Equipment: No  Physical Therapy Consult: No  Occupational Therapy Consult: No  Speech Therapy Consult: No  Support Systems: Parent(s),Other Family Member(s)  Discharge Location  Patient Expects to be Discharged to[de-identified] Unable to determine at this time

## 2022-06-01 NOTE — ED NOTES
Report given to [address] [place] [address] at [facility] to room 428. [facility] eta 1330 as per charge RN.

## 2022-06-01 NOTE — ED NOTES
Pt reports she is not able to give urine sample at this time. Pt notified that we will attempt for urine sample after fluids are done infusing. Pt agrees. Will continue to reassess.

## 2022-06-01 NOTE — PROGRESS NOTES
All items removed from room once pt has arrived per suicide protocol. Spoke with, Rene Stephenson, from poison control. Pt ok to be released from their serviced d/t pt being \"back to baseline\". Dr Lyubov Ochoa notified via Perfect serve.

## 2022-06-01 NOTE — ED NOTES
Pt undressed and placed into hospital gown with RN assistance. Belongings placed in belongings bag with patient label. RN remains at bedside for 1:1 observation.

## 2022-06-01 NOTE — PROGRESS NOTES
Enoxaparin 40 mg SQ Q24H adjusted to 40 mg SQ Q12H (weight 151-174 kg, CrCl > 30 mL/min) per protocol    Thank you,  Nikko ORDAZ, [facility]

## 2022-06-01 NOTE — ED NOTES
[facility] at bedside to transport patient to [address] [place] [address]. [facility] given report and patient belongings including home medications. Pt ambulated to ems stretcher independently in stable condition. vss upon transfer. All necessary paperwork given to ems staff.

## 2022-06-01 NOTE — ED NOTES
Bsmart in room via teledoc cart at this time, speaking to patient. Pt tearful at this time and cooperative with assessment.

## 2022-06-01 NOTE — BSMART NOTE
BSMART assessment completed, and suicide risk level noted to be HIGH. Primary Nurse Valentina Samples notified.    BSmart informed that patient's belongings have been removed and that she already is a 1:1.       Security/Off- has not been notified-no officer.

## 2022-06-02 VITALS
BODY MASS INDEX: 50.02 KG/M2 | HEIGHT: 64 IN | RESPIRATION RATE: 18 BRPM | SYSTOLIC BLOOD PRESSURE: 146 MMHG | WEIGHT: 293 LBS | OXYGEN SATURATION: 97 % | HEART RATE: 85 BPM | TEMPERATURE: 98.5 F | DIASTOLIC BLOOD PRESSURE: 88 MMHG

## 2022-06-02 LAB
ALBUMIN SERPL-MCNC: 3.2 G/DL (ref 3.5–5)
ALBUMIN/GLOB SERPL: 0.9 {RATIO} (ref 1.1–2.2)
ALP SERPL-CCNC: 112 U/L (ref 45–117)
ALT SERPL-CCNC: 21 U/L (ref 12–78)
ANION GAP SERPL CALC-SCNC: 10 MMOL/L (ref 5–15)
AST SERPL-CCNC: 15 U/L (ref 15–37)
BILIRUB SERPL-MCNC: 0.8 MG/DL (ref 0.2–1)
BUN SERPL-MCNC: 9 MG/DL (ref 6–20)
BUN/CREAT SERPL: 12 (ref 12–20)
CALCIUM SERPL-MCNC: 8.7 MG/DL (ref 8.5–10.1)
CHLORIDE SERPL-SCNC: 106 MMOL/L (ref 97–108)
CO2 SERPL-SCNC: 22 MMOL/L (ref 21–32)
CREAT SERPL-MCNC: 0.75 MG/DL (ref 0.55–1.02)
ERYTHROCYTE [DISTWIDTH] IN BLOOD BY AUTOMATED COUNT: 13.9 % (ref 11.5–14.5)
GLOBULIN SER CALC-MCNC: 3.7 G/DL (ref 2–4)
GLUCOSE BLD STRIP.AUTO-MCNC: 112 MG/DL (ref 65–117)
GLUCOSE BLD STRIP.AUTO-MCNC: 149 MG/DL (ref 65–117)
GLUCOSE BLD STRIP.AUTO-MCNC: 206 MG/DL (ref 65–117)
GLUCOSE SERPL-MCNC: 131 MG/DL (ref 65–100)
HCT VFR BLD AUTO: 38.9 % (ref 35–47)
HGB BLD-MCNC: 13.1 G/DL (ref 11.5–16)
MAGNESIUM SERPL-MCNC: 2.2 MG/DL (ref 1.6–2.4)
MCH RBC QN AUTO: 29.7 PG (ref 26–34)
MCHC RBC AUTO-ENTMCNC: 33.7 G/DL (ref 30–36.5)
MCV RBC AUTO: 88.2 FL (ref 80–99)
NRBC # BLD: 0 K/UL (ref 0–0.01)
NRBC BLD-RTO: 0 PER 100 WBC
PLATELET # BLD AUTO: 291 K/UL (ref 150–400)
PMV BLD AUTO: 10.2 FL (ref 8.9–12.9)
POTASSIUM SERPL-SCNC: 3.6 MMOL/L (ref 3.5–5.1)
PROT SERPL-MCNC: 6.9 G/DL (ref 6.4–8.2)
RBC # BLD AUTO: 4.41 M/UL (ref 3.8–5.2)
SERVICE CMNT-IMP: ABNORMAL
SERVICE CMNT-IMP: ABNORMAL
SERVICE CMNT-IMP: NORMAL
SODIUM SERPL-SCNC: 138 MMOL/L (ref 136–145)
WBC # BLD AUTO: 8 K/UL (ref 3.6–11)

## 2022-06-02 PROCEDURE — 80053 COMPREHEN METABOLIC PANEL: CPT

## 2022-06-02 PROCEDURE — 74011250637 HC RX REV CODE- 250/637: Performed by: INTERNAL MEDICINE

## 2022-06-02 PROCEDURE — 82962 GLUCOSE BLOOD TEST: CPT

## 2022-06-02 PROCEDURE — U0005 INFEC AGEN DETEC AMPLI PROBE: HCPCS

## 2022-06-02 PROCEDURE — 74011250636 HC RX REV CODE- 250/636: Performed by: INTERNAL MEDICINE

## 2022-06-02 PROCEDURE — 96372 THER/PROPH/DIAG INJ SC/IM: CPT

## 2022-06-02 PROCEDURE — 83735 ASSAY OF MAGNESIUM: CPT

## 2022-06-02 PROCEDURE — 74011636637 HC RX REV CODE- 636/637: Performed by: INTERNAL MEDICINE

## 2022-06-02 PROCEDURE — 36415 COLL VENOUS BLD VENIPUNCTURE: CPT

## 2022-06-02 PROCEDURE — G0378 HOSPITAL OBSERVATION PER HR: HCPCS

## 2022-06-02 PROCEDURE — 85027 COMPLETE CBC AUTOMATED: CPT

## 2022-06-02 RX ORDER — BUDESONIDE 0.5 MG/2ML
500 INHALANT ORAL
Status: DISCONTINUED | OUTPATIENT
Start: 2022-06-02 | End: 2022-06-02

## 2022-06-02 RX ORDER — AMLODIPINE BESYLATE 5 MG/1
10 TABLET ORAL DAILY
Status: DISCONTINUED | OUTPATIENT
Start: 2022-06-02 | End: 2022-06-03 | Stop reason: HOSPADM

## 2022-06-02 RX ORDER — ATENOLOL 25 MG/1
25 TABLET ORAL DAILY
Status: DISCONTINUED | OUTPATIENT
Start: 2022-06-03 | End: 2022-06-03 | Stop reason: HOSPADM

## 2022-06-02 RX ORDER — ARFORMOTEROL TARTRATE 15 UG/2ML
15 SOLUTION RESPIRATORY (INHALATION)
Status: DISCONTINUED | OUTPATIENT
Start: 2022-06-02 | End: 2022-06-03 | Stop reason: HOSPADM

## 2022-06-02 RX ORDER — LOSARTAN POTASSIUM 50 MG/1
50 TABLET ORAL DAILY
Status: DISCONTINUED | OUTPATIENT
Start: 2022-06-03 | End: 2022-06-03 | Stop reason: HOSPADM

## 2022-06-02 RX ORDER — ARFORMOTEROL TARTRATE 15 UG/2ML
15 SOLUTION RESPIRATORY (INHALATION)
Status: DISCONTINUED | OUTPATIENT
Start: 2022-06-02 | End: 2022-06-02

## 2022-06-02 RX ORDER — BUDESONIDE 0.5 MG/2ML
500 INHALANT ORAL
Status: DISCONTINUED | OUTPATIENT
Start: 2022-06-02 | End: 2022-06-03 | Stop reason: HOSPADM

## 2022-06-02 RX ORDER — FLUOXETINE HYDROCHLORIDE 20 MG/1
60 CAPSULE ORAL DAILY
Status: DISCONTINUED | OUTPATIENT
Start: 2022-06-03 | End: 2022-06-03 | Stop reason: HOSPADM

## 2022-06-02 RX ADMIN — PANTOPRAZOLE SODIUM 40 MG: 40 TABLET, DELAYED RELEASE ORAL at 08:59

## 2022-06-02 RX ADMIN — ENOXAPARIN SODIUM 40 MG: 100 INJECTION SUBCUTANEOUS at 09:00

## 2022-06-02 RX ADMIN — ATORVASTATIN CALCIUM 20 MG: 20 TABLET, FILM COATED ORAL at 08:53

## 2022-06-02 RX ADMIN — ASPIRIN 81 MG: 81 TABLET, COATED ORAL at 08:53

## 2022-06-02 RX ADMIN — AMLODIPINE BESYLATE 10 MG: 5 TABLET ORAL at 08:59

## 2022-06-02 RX ADMIN — ACETAMINOPHEN 650 MG: 325 TABLET ORAL at 16:14

## 2022-06-02 RX ADMIN — INSULIN LISPRO 3 UNITS: 100 INJECTION, SOLUTION INTRAVENOUS; SUBCUTANEOUS at 08:54

## 2022-06-02 RX ADMIN — MONTELUKAST 10 MG: 10 TABLET, FILM COATED ORAL at 08:53

## 2022-06-02 RX ADMIN — INSULIN LISPRO 2 UNITS: 100 INJECTION, SOLUTION INTRAVENOUS; SUBCUTANEOUS at 13:43

## 2022-06-02 NOTE — PROGRESS NOTES
[facility]  [address] [facility], [facility]  [phone]    Medical Progress Note      NAME: Salas Gill   :  1980  MRM:  [id number]    Date/Time of service 2022  1:53 PM          Assessment and Plan:     Overdose, intentional self-harm, initial encounter / Anxiety and depression / Suicidal ideation - POA, severely depressed. BSMART I ER determined she needs inpatient psych. I agree. CM consulted to arrange it. She is medically stable for discharge: Awake, no bradycardia, no hypotension, no hypoxia, normal QTc, undetectable tylenol, undetectable ASA, BAL low, Drug screen with THC, opiates and benzo, as anticipated.     DM (diabetes mellitus), type 2 - Diabetic diet and counseling. SSI per protocol. Continue home NPH but for now hold glipizide and trulicity. A1c 7.1.     Morbid obesity - Advise weight loss and outpatient KIMBERLY testing.     Hypertension - High. Resume atenolol, losartan. Continue Norvasc     Chronic pain - Hold percocet. Continue flexeril and NSAIDS.     Hyperlipidemia - Continue atorvastatin     Asthma - Continue Brovana and pulmicort, in place of Advair, and Prn duonebs. More likely her wheezing is related to obesity and GERD     GERD - PPI     Alcohol abuse - Advise cessation. Start Goody vitamins. Low risk of withdrawal I        Subjective:     Chief Complaint:  Less depressed    ROS:  (bold if positive, if negative)    Tolerating PT  Tolerating Diet        Objective:     Last 24hrs VS reviewed since prior progress note.  Most recent are:    Visit Vitals  BP (!) 175/80 (BP 1 Location: Left lower arm, BP Patient Position: At rest)   Pulse (!) 108   Temp 99 °F (37.2 °C)   Resp 16   Ht 5' 4\" (1.626 m)   Wt 151.1 kg (333 lb 1.8 oz)   SpO2 98%   BMI 57.18 kg/m²     SpO2 Readings from Last 6 Encounters:   22 98%   22 95%   22 97%   22 98%   21 95%   20 98%    O2 Flow Rate (L/min): 2 l/min Intake/Output Summary (Last 24 hours) at 6/2/2022 1353  Last data filed at 6/1/2022 1700  Gross per 24 hour   Intake 300 ml   Output --   Net 300 ml        Physical Exam:    Gen:  Morbid obese, in acute emotional distress  HEENT:  Pink conjunctivae, PERRL, hearing intact to voice, moist mucous membranes  Neck:  Supple, without masses, thyroid non-tender  Resp:  No accessory muscle use, clear breath sounds without wheezes rales or rhonchi  Card:  No murmurs, normal S1, S2 without thrills, bruits or peripheral edema  Abd:  Soft, non-tender, non-distended, normoactive bowel sounds are present, no mass  Lymph:  No cervical or inguinal adenopathy  Musc:  No cyanosis or clubbing  Skin:  No rashes or ulcers, skin turgor is good  Neuro:  Cranial nerves are grossly intact, no focal motor weakness, follows commands   Psych:  Poor insight, oriented to person, place and time    Telemetry reviewed:   normal sinus rhythm  __________________________________________________________________  Medications Reviewed: (see below)  Medications:     Current Facility-Administered Medications   Medication Dose Route Frequency    amLODIPine (NORVASC) tablet 10 mg  10 mg Oral DAILY    glucose chewable tablet 16 g  4 Tablet Oral PRN    dextrose 10% infusion 0-250 mL  0-250 mL IntraVENous PRN    glucagon (GLUCAGEN) injection 1 mg  1 mg IntraMUSCular PRN    acetaminophen (TYLENOL) tablet 650 mg  650 mg Oral Q6H PRN    polyethylene glycol (MIRALAX) packet 17 g  17 g Oral DAILY PRN    ondansetron (ZOFRAN ODT) tablet 4 mg  4 mg Oral Q8H PRN    insulin lispro (HUMALOG) injection   SubCUTAneous AC&HS    aspirin delayed-release tablet 81 mg  81 mg Oral DAILY    atorvastatin (LIPITOR) tablet 20 mg  20 mg Oral DAILY    albuterol-ipratropium (DUO-NEB) 2.5 MG-0.5 MG/3 ML  3 mL Nebulization Q6H PRN    cyclobenzaprine (FLEXERIL) tablet 5 mg  5 mg Oral QHS    meloxicam (MOBIC) tablet 15 mg  15 mg Oral DAILY PRN    montelukast (SINGULAIR) tablet 10 mg  10 mg Oral DAILY    pantoprazole (PROTONIX) tablet 40 mg  40 mg Oral DAILY    enoxaparin (LOVENOX) injection 40 mg  40 mg SubCUTAneous Q12H    arformoteroL (BROVANA) neb solution 15 mcg  15 mcg Nebulization BID RT    budesonide (PULMICORT) 500 mcg/2 ml nebulizer suspension  500 mcg Nebulization BID RT        Lab Data Reviewed: (see below)  Lab Review:     Recent Labs     06/02/22  0457 06/01/22  0936   WBC 8.0 7.1   HGB 13.1 13.5   HCT 38.9 40.0    304     Recent Labs     06/02/22  0457 06/01/22  0936    138   K 3.6 3.9    103   CO2 22 22   * 147*   BUN 9 10   CREA 0.75 0.76   CA 8.7 9.0   MG 2.2  --    ALB 3.2* 4.2   TBILI 0.8 0.4   ALT 21 21     Lab Results   Component Value Date/Time    Glucose (POC) 149 (H) 06/02/2022 01:20 PM    Glucose (POC) 206 (H) 06/02/2022 08:39 AM    Glucose (POC) 132 (H) 06/01/2022 10:36 PM    Glucose (POC) 119 (H) 06/01/2022 03:22 PM    Glucose (POC) 86 07/18/2016 12:06 PM     No results for input(s): PH, PCO2, PO2, HCO3, FIO2 in the last 72 hours. No results for input(s): INR, INREXT in the last 72 hours. All Micro Results     Procedure Component Value Units Date/Time    COVID-19 RAPID TEST [[id number]] Collected: 06/01/22 1151    Order Status: Completed Specimen: Nasopharyngeal Updated: 06/01/22 1234     Specimen source Nasopharyngeal        COVID-19 rapid test Not detected        Comment: Rapid Abbott ID Now       Rapid NAAT:  The specimen is NEGATIVE for SARS-CoV-2, the novel coronavirus associated with COVID-19. Negative results should be treated as presumptive and, if inconsistent with clinical signs and symptoms or necessary for patient management, should be tested with an alternative molecular assay. Negative results do not preclude SARS-CoV-2 infection and should not be used as the sole basis for patient management decisions.        This test has been authorized by the FDA under an Emergency Use Authorization (EUA) for use by authorized laboratories. Fact sheet for Healthcare Providers: Hillary  Fact sheet for Patients: BagFit.trey       Methodology: Isothermal Nucleic Acid Amplification         CULTURE, URINE [[id number]]     Order Status: Sent Specimen: Urine from Clean catch           Other pertinent lab: none    Total time spent with patient: 30 Minutes I personally reviewed chart, notes, data and current medications in the medical record. I have personally examined and treated the patient at bedside during this period. To assist coordination of care and communication with nursing and staff, this note may be preliminary early in the day, but finalized by end of the day.                  Care Plan discussed with: Patient, Care Manager, Nursing Staff, Consultant/Specialist and >50% of time spent in counseling and coordination of care    Discussed:  Care Plan and D/C Planning    Prophylaxis:  H2B/PPI    Disposition:  psych unit           ___________________________________________________    Attending Physician: Max Weber MD

## 2022-06-02 NOTE — DISCHARGE SUMMARY
Physician Discharge Summary     Patient ID:  Byron Jenkins  [id number]  38 y.o.  1980    Admit date: 6/1/2022    Discharge date of service and time: 6/2/2022    Admission Diagnoses: Overdose, intentional self-harm, initial encounter [facility]) Anne Marie Baxter    Discharge Diagnoses:    Principal Diagnosis   Overdose, intentional self-harm, initial encounter [facility])                                             Hospital Course and other diagnoses  Overdose, intentional self-harm, initial encounter / Anxiety and depression / Suicidal ideation - POA, severely depressed.  BSMART I ER determined she needs inpatient psych. I agree. CM consulted to arrange it. She is medically stable for discharge: Awake, no bradycardia, no hypotension, no hypoxia, normal QTc, undetectable tylenol, undetectable ASA, BAL low, Drug screen with THC, opiates and benzo, as anticipated. She was interviewed by psychiatry on June 2 PM, and they did not clear her to DC home. Patient did not agree to voluntary inpatient psychiatric transfer. [place] [facility] was called. They interviewed patient. They cleared her to DC home. I have no option but to let her leave.     DM (diabetes mellitus), type 2 - Diabetic diet and counseling.  SSI per protocol.  Continue home NPH but for now hold glipizide and trulicity. A1c 7.1.     Morbid obesity - Advise weight loss and outpatient KIMBERLY testing.     Hypertension - High. Resume atenolol, losartan.  Continue Norvasc     Chronic pain - Hold percocet.  Continue flexeril and NSAIDS.     Hyperlipidemia - Continue atorvastatin     Asthma - Continue Brovana and pulmicort, in place of Advair, and Prn duonebs.  More likely her wheezing is related to obesity and GERD     GERD - PPI     Alcohol abuse - Advise cessation.  Start Goody vitamins.  Low risk of withdrawal I      PCP: Rosemarie Peters MD    Consults: Psychiatry    Significant Diagnostic Studies: See Hospital Course    Discharged home in improved condition. Discharge Exam:  BP (!) 175/80 (BP 1 Location: Left lower arm, BP Patient Position: At rest)   Pulse (!) 108   Temp 99 °F (37.2 °C)   Resp 16   Ht 5' 4\" (1.626 m)   Wt 151.1 kg (333 lb 1.8 oz)   SpO2 98%   BMI 57.18 kg/m²      Gen:  Morbid obese, in acute emotional distress  HEENT:  Pink conjunctivae, PERRL, hearing intact to voice, moist mucous membranes  Neck:  Supple, without masses, thyroid non-tender  Resp:  No accessory muscle use, clear breath sounds without wheezes rales or rhonchi  Card:  No murmurs, normal S1, S2 without thrills, bruits or peripheral edema  Abd:  Soft, non-tender, non-distended, normoactive bowel sounds are present, no mass  Lymph:  No cervical or inguinal adenopathy  Musc:  No cyanosis or clubbing  Skin:  No rashes or ulcers, skin turgor is good  Neuro:  Cranial nerves are grossly intact, no focal motor weakness, follows commands   Psych:  Poor insight, oriented to person, place and time    Patient Instructions:   Current Discharge Medication List      CONTINUE these medications which have NOT CHANGED    Details   FLUoxetine (PROzac) 20 mg capsule Take 3 Capsules by mouth daily. Qty: 270 Capsule, Refills: 1    Associated Diagnoses: Adjustment disorder with depressed mood      atenoloL (TENORMIN) 25 mg tablet Take 2 TABS the night before and 1 TAB the morning of the test  Qty: 3 Tablet, Refills: 0      clonazePAM (KlonoPIN) 0.5 mg tablet Take 1 Tablet by mouth two (2) times daily as needed for Anxiety. Max Daily Amount: 1 mg. Qty: 60 Tablet, Refills: 1    Associated Diagnoses: Adjustment disorder with depressed mood; Anxiety as acute reaction to exceptional stress      Blood-Glucose Sensor (Dexcom G6 Sensor) ta Check sugars 5-6 x per day.   Needs filled early due to problem with sensor recently  Qty: 9 Each, Refills: 2    Associated Diagnoses: Uncontrolled type 2 diabetes mellitus with hyperglycemia (HCC)      glipiZIDE (GLUCOTROL) 5 mg tablet Take 1 Tablet by mouth Daily (before dinner). Qty: 90 Tablet, Refills: 1    Associated Diagnoses: Diabetes mellitus type 2 in obese ([facility])      cyclobenzaprine (FLEXERIL) 5 mg tablet Take 1 Tablet by mouth nightly. Qty: 30 Tablet, Refills: 0    Associated Diagnoses: Muscle spasm      meloxicam (MOBIC) 15 mg tablet Take 1 Tablet by mouth daily (after breakfast). Take x 1 week and then stop. May use daily after this as needed. Qty: 30 Tablet, Refills: 0    Associated Diagnoses: Patellar tendinitis of left knee; Chronic pain of left knee; Bilateral hand pain      dulaglutide (TRULICITY) 1.5 VT/2.4 mL sub-q pen 0.5 mL by SubCUTAneous route every seven (7) days. Use zero copay voucher pt will bring for 4 pens please. Qty: 4 Each, Refills: 0    Associated Diagnoses: Diabetes mellitus type 2 in obese ([facility])      insulin nph-regular human rec (HUMULIN 70-30) 100 unit/mL (70-30) inpn 70 units before breakfast and before dinner - dose updated on chart  Qty: 50 mL, Refills: 0    Associated Diagnoses: Diabetes mellitus type 2 in obese ([facility])      Blood-Glucose Transmitter (Dexcom G6 Transmitter) ta Check sugars 5-6 x per day  Qty: 1 Each, Refills: 0    Associated Diagnoses: Uncontrolled type 2 diabetes mellitus with hyperglycemia ([facility])      pantoprazole (PROTONIX) 40 mg tablet Take 1 Tablet by mouth daily. Qty: 90 Tablet, Refills: 2    Associated Diagnoses: Gastroesophageal reflux disease, unspecified whether esophagitis present      montelukast (SINGULAIR) 10 mg tablet Take 1 Tablet by mouth daily. Qty: 90 Tablet, Refills: 2    Associated Diagnoses: Severe persistent asthma without complication      HumaLOG U-100 Insulin 100 unit/mL injection Take 25 units before lunch and sliding scale at bedtime as directed  Qty: 10 mL, Refills: 2    Associated Diagnoses: Diabetes mellitus type 2 in obese ([facility])      amLODIPine (NORVASC) 5 mg tablet Take 1 Tablet by mouth daily. Take 1 Tab by mouth daily.   Qty: 90 Tablet, Refills: 2    Associated Diagnoses: Essential hypertension      albuterol (PROVENTIL HFA, VENTOLIN HFA, PROAIR HFA) 90 mcg/actuation inhaler Take 2 Puffs by inhalation every four (4) hours as needed for Wheezing. Qty: 3 Each, Refills: 1    Associated Diagnoses: Severe persistent asthma without complication      atorvastatin (LIPITOR) 20 mg tablet Take 1 Tablet by mouth daily. Qty: 90 Tablet, Refills: 1    Associated Diagnoses: Diabetes mellitus type 2 in obese (HCC)      losartan (COZAAR) 50 mg tablet Take 1 Tablet by mouth daily. Increased dose  Qty: 90 Tablet, Refills: 3    Associated Diagnoses: Essential hypertension      fluticasone propion-salmeteroL (Advair Diskus) 500-50 mcg/dose diskus inhaler Take 1 Puff by inhalation two (2) times a day. Qty: 3 Inhaler, Refills: 1    Associated Diagnoses: Severe persistent asthma without complication      tiotropium bromide (SPIRIVA RESPIMAT) 2.5 mcg/actuation inhaler Take 2 Puffs by inhalation daily. Qty: 3 Inhaler, Refills: 1      budesonide (PULMICORT) 1 mg/2 mL nbsp 2 mL by Nebulization route two (2) times daily as needed for Cough. Do not use on days when using Advair  Qty: 270 Each, Refills: 1    Associated Diagnoses: Severe persistent asthma with exacerbation      magnesium oxide (MAG-OX) 400 mg tablet Take 1 Tab by mouth daily. Qty: 90 Tab, Refills: 1    Associated Diagnoses: Leg cramps      zinc sulfate (ZINC-220 PO) Take  by mouth daily. cetirizine (ZYRTEC) 10 mg tablet Take 10 mg by mouth daily as needed for Allergies. TRUEplus Insulin 1 mL 31 gauge x 5/16 syrg Use with insulin  Qty: 200 Syringe, Refills: 2      aspirin delayed-release 81 mg tablet Take 81 mg by mouth daily. albuterol (PROVENTIL VENTOLIN) 2.5 mg /3 mL (0.083 %) nebu       ascorbic acid, vitamin C, (Vitamin C) 500 mg tablet Take 500 mg by mouth daily. hydroquinone (ESOTERICA, MELQUIN) 4 % topical cream Apply  to affected area two (2) times a day.   Qty: 30 g, Refills: 0    Associated Diagnoses: Melasma cranberry extract 450 mg tab tablet Take 450 mg by mouth daily. STOP taking these medications       acetaminophen-codeine (Tylenol-Codeine #3) 300-30 mg per tablet Comments:   Reason for Stopping:             Activity: Activity as tolerated  Diet: Cardiac Diet, Diabetic Diet and Low fat, Low cholesterol  Wound Care: None needed    Follow-up with your PCP and psychiatry  in a few days.   Follow-up tests/labs - none    Signed:  Levi Griggs MD  6/2/2022  7:05 PM

## 2022-06-02 NOTE — PROGRESS NOTES
Bedside shift change report given to [address] (oncoming nurse) by Isabella Mera (offgoing nurse). Report included the following information SBAR, Kardex, Intake/Output, MAR and Recent Results.

## 2022-06-02 NOTE — PROGRESS NOTES
6/2/2022 6:49 PM Spoke with Yamila Pradhan with [address] who has cleared pt to discharge with a safety plan. Safety plan was discussed with pt's friend [place] and Mireille with Yamila Pradhan. CM received return phone call from pt's mother, Lionel Thorne. CM discussed [facility] recommendation of pt to discharge home with her, PCP follow up within 24 hours for medication change recommendations, and pt's mother agreeable to plan. Pt's mother confirmed she works for pt's PCP and will arrange an appt for pt tomorrow. Pt's mother is agreeable to pt discharging to her home today. Pt's mother reported she feels pt will do better at her home and she will also be able to oversee pt's medication management. Pt's mother reported she is in a work meeting but she will be to [facility] between 9-10PM to transport pt home. CM met with pt and relayed the above, pt is agreeable. CM notified pt's MD, requested pt's discharge. [facility]ila [facility] to contact [place] [phone] on 6/3 to send over report recommendations. [facility] information added to pt's AVS.  Pt's RN aware of plan. 6/2/2022 6:43 PM CM received phone call from aYmila Pradhan, she was unable to contact pt's mother. Yamila Pradhan requesting any alternative family member for pt. RUIZ met with pt, pt attempted to call her mother multiple times, her sister and also called her best friend, [place] and Mireille Jimenez([phone]). Chrissy Navarrete was the only one available by phone and she was agreeable to speaking with Yamila Pradhan with [address] RUIZ called back to Yamila Pradhan who will contact MsYumi Olivoie Thor in attempts to have a safety plan made for pt. Yamila Pradhan to contact RUIZ back. 6/2/2022  6:21 PM CM received return phone call from Yamila Pradhan with [address] who reported she is exploring 2 options for pt. First speaking with pt's mother to discuss a safety plan for pt. If a safety plan can be made pt will be able to be discharged.   If safety plan cannot be made, Yamila Pradhan will start a bed search for pt and pursue a TDO. Padmini Adams reported a bed will need to be found before TDO can be in effect. Padmini Adams to call pt's mother and contact CM back. 6/2/2022 5:55 PM Pt being evaluated at bedside by Padmini Adams with [facility] via video conference. 6/2/2022 5:48 PM CM received return phone call from Padmini Adams with [facility]. CM discussed pt, Padmini Adams agreeable to doing bedside eval over the computer. CM faxed pt's clinicals to [facility] at [phone]. Met with pt, discussed recommendation for inpatient psych. Pt expressed her frustration and requesting to leave AMA. CM relayed to pt she will need to be evaluated by [facility]. Pt agreeable to this being done. 6/2/2022 5:15 PM Psych eval noted, inpatient psych continues to be recommended, pt not agreeable. Recommendation to contact [facility] for TDO. CM called to [facility] at [phone], left message with answering service. 6/2/2022  11:40 AM CM requested psych consult from pt's attending. CM called to [facility] inpatient psych facilities, they are both closed for admissions at this time. [facility] inpatient psych does not have any bed for pt at this time but she is on the waitlist. PCR requested from pt's RN. Will follow for pt's psych eval. LESLEY Muro    Care Management Interventions  PCP Verified by CM:  Yes  Transition of Care Consult (CM Consult): DME/Supply Assistance  MyChart Signup: No  Discharge Durable Medical Equipment: No  Physical Therapy Consult: No  Occupational Therapy Consult: No  Speech Therapy Consult: No  Support Systems: Parent(s),Other Family Member(s)  Discharge Location  Patient Expects to be Discharged to[de-identified] Home with family assistance

## 2022-06-02 NOTE — DISCHARGE INSTRUCTIONS
Patient Discharge Instructions    Dalia Maynard / [id number] : 1980    Admitted 2022 Discharged: 2022     Primary Diagnoses  Problem List as of 2022 Date Reviewed: 2022           DM (diabetes mellitus), type 2 ([facility]   Asthma   Anxiety and depression   Alcohol abuse   * (Principal) Overdose, intentional self-harm, initial encounter ([facility]   Suicidal ideation   Hypertension   Morbid obesity ([facility]   RESOLVED: Transient ischemic attack involving right internal carotid artery          Take Home Medications       It is important that you take the medication exactly as they are prescribed. Keep your medication in the bottles provided by the pharmacist and keep a list of the medication names, dosages, and times to be taken in your wallet. Do not take other medications without consulting your doctor. What to do at Home    Recommended diet: Cardiac Diet, Diabetic Diet and Low fat, Low cholesterol    Recommended activity: Activity as tolerated    If you experience worse depression, please follow up with psychiatry. Follow-up with your PCP in a few weeks    Follow-up Information     Follow up With Specialties Details Why Contact Info    Rajani Jesus MD Family Medicine Schedule an appointment as soon as possible for a visit in 1 week  [facility] [address] [place] [address]             ACCESS TO [address] [place] [address]    Schedule:   Monday - Thursday         8:30 a.m. - 5:00 p.m. Friday                      8:30 a.m. - 2:00 p.m. When services are indicated and you arrive with the items below, registration and the initial assessment can occur during any of the times noted above. This process may require a minimum stay of two hours. Service requests for Indian-speaking individuals who speak limited    English are scheduled in advance.   Call [phone] to arrange an appointment. Please bring:    A picture ID  Social Security Number  All Insurance Cards (If Medicaid, include HMO Cards)   Applicable Co-payment* (cash, check, or money order)      *Contact your insurance company prior to your arrival to obtain co-payment amount  Proof of household Income:   [employer] [employer]  and wifes income (pay stubs, W-2 forms, 4799 or tax return)   Child Support   SSDI   [place] or pension payments  Legal Guardian/Custody documentation  List of all Medications, dosages and prescribing doctor  Names of Physical Health Conditions  Name, address and phone number for an emergency   Name, address and phone number of Primary Care Physician      Any questions, please call Intake at [phone]. Contact CRISIS for mental health emergencies: [phone]                         Information obtained by :  I understand that if any problems occur once I am at home I am to contact my physician. I understand and acknowledge receipt of the instructions indicated above.                                                                                                                                            Physician's or R.N.'s Signature                                                                  Date/Time                                                                                                                                              Patient or Representative Signature                                                          Date/Time

## 2022-06-02 NOTE — PROGRESS NOTES
patient takes norvasc but cannot recall last time she took this med. .. believes it has been a while because her daily life has been very stressful and she has been using alcohol and other meds instead. .. her BPs today have steadily gotten higher ; last BP was 199/88; patient is asymptomatic and is suppose to get her norvasc starting tomorrow. .. notified on call  of elevated BPs

## 2022-06-02 NOTE — DIABETES MGMT
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 The patient is admitted after 8 admitted suicide attempt with alcohol, clonazepam, Percocet and fluoxetine. Oratory tests on admission include glucose of 147, A1c 7.1%, CO2 22, anion gap 13, creatinine 0.76, white count 7.1, hemoglobin 13.5, platelets 221. Regarding her diabetes, the patient has been pregnant 3 times and did not have diabetes during any of her pregnancies. The diabetes developed coincident with high-dose glucocorticoids for eosinophilic asthma. Prior to admission she was on Glucotrol, Trulicity 1.5 mg, 21/00 insulin 70 units twice daily, and Humalog insulin 25 units. She has been off glucocorticoids for the last 6 months and has been off all antihyperglycemic's for the last 6 months. She is pleasantly surprised that her A1c is 7.1%. Examination this is an obese woman in no acute distress  Blood pressure 175/80  Pulse 108  Temperature 99  98% on room air  Weight 151 kg    Recent laboratory data  Glucose 206 (range 119-206)  In 0.75  Calcium 8.7  Albumin 3.2  AST 15  ALT 21    Impression  1. Suicide attempt currently stable under observation  2. History of glucocorticoid induced diabetes currently off all antihyperglycemic's with mild hyperglycemia    Recommendation  1. It would appear that correction insulin only is all that is required at this time. 2.  We will sign off. Please do not hesitate to contact us again if needed    Total time 25 minutes, more than 50% of which was in direct patient care and/or care coordination. Please note that this dictation was completed with Nar[employer]i, the [employer] voice recognition software. Quite often unanticipated grammatical, syntax, homophones, and other interpretive errors are inadvertently transcribed by the computer software.   Please disregard these errors. Please excuse any errors that have escaped final proofreading.

## 2022-06-02 NOTE — CONSULTS
PSYCHIATRY CONSULT NOTE:    REASON FOR CONSULT: Suicide attempt    HISTORY OF PRESENTING COMPLAINT:  Nohemy Hodge is a 38 y.o. BLACK/ female who is currently admitted to the medical floor at [facility] after overdosing on Klonopin, 80mg Prozac (her prescribed dose is 60mg) and Percocet, as well as tequila. Psychosocial stressors contributing to her worsening mental health include her fiance's recent spinal surgery that led to him having to relearn how to walk, talk, and eat. She also has several health conditions of her own that are causing her stress. Ms. Vlad Marroquin was calm and cooperative during assessment. She states she immediately regretted her overdose yesterday and called the suicide hotline after taking it. The suicide attempt was impulsive. She has been feeling very hopeless. She denies current SI/plan/intent and HI/plan/intent. She does not see a psychiatrist, but she is going to start making calls. Her PCP has been prescribing her Prozac. PAST PSYCHIATRIC HISTORY: This was Ms. Lee's first suicide attempt. She has never been admitted to inpatient psychiatry before. No hx of perceptual disturbances. SUBSTANCE ABUSE HISTORY: She reports a hx of using THC for eye pain, but does not feel she was abusing it. For the past 2 weeks, she has been drinking about 4-5 drinks daily. PSYCHOSOCIAL HISTORY: Ms. Vlad Marroquin is engaged and has 3 children. She does not work due to her medical issues. No legal hx. PAST MEDICAL HISTORY:  Please see H&P for details. Past Medical History:   Diagnosis Date    Alcohol abuse     Anxiety and depression     Asthma     DM [facility]di[facility] mellitus[facility] 2 [facility]     Morbid obesity ([facility] 10/28/2014     Prior to Admission medications    Medication Sig Start Date End Date Taking? Authorizing Provider   acetaminophen-codeine (Tylenol-Codeine #3) 300-30 mg per tablet Take 1 Tablet by mouth every four (4) hours as needed for Pain for up to 5 days. Max Daily Amount: 6 Tablets. 5/27/22 6/1/22  Chaya Campoverde MD   FLUoxetine (PROzac) 20 mg capsule Take 3 Capsules by mouth daily. 5/23/22   Chaya Campoverde MD   atenoloL (TENORMIN) 25 mg tablet Take 2 TABS the night before and 1 TAB the morning of the test 5/17/22   Scar Rivers T,    clonazePAM (KlonoPIN) 0.5 mg tablet Take 1 Tablet by mouth two (2) times daily as needed for Anxiety. Max Daily Amount: 1 mg. 5/13/22   Chaya Campoverde MD   Blood-Glucose Sensor (Dexcom G6 Sensor) ta Check sugars 5-6 x per day. Needs filled early due to problem with sensor recently 4/22/22   Chaya Campoverde MD   glipiZIDE (GLUCOTROL) 5 mg tablet Take 1 Tablet by mouth Daily (before dinner). 4/22/22   Chaya Campoverde MD   cyclobenzaprine (FLEXERIL) 5 mg tablet Take 1 Tablet by mouth nightly. 3/30/22   Chaya Campoverde MD   meloxicam (MOBIC) 15 mg tablet Take 1 Tablet by mouth daily (after breakfast). Take x 1 week and then stop. May use daily after this as needed. 3/30/22   Chaya Campoverde MD   dulaglutide (TRULICITY) 1.5 NI/8.3 mL sub-q pen 0.5 mL by SubCUTAneous route every seven (7) days. Use zero copay voucher pt will bring for 4 pens please. 3/23/22   Chaya Campoverde MD   insulin nph-regular human rec (HUMULIN 70-30) 100 unit/mL (70-30) inpn 70 units before breakfast and before dinner - dose updated on chart 3/9/22   Chaya Campoverde MD   Blood-Glucose Transmitter (Dexcom G6 Transmitter) ta Check sugars 5-6 x per day 3/9/22   Chaya Campoverde MD   pantoprazole (PROTONIX) 40 mg tablet Take 1 Tablet by mouth daily. 12/7/21   Chaya Campoverde MD   montelukast (SINGULAIR) 10 mg tablet Take 1 Tablet by mouth daily. 12/7/21   Chaya Campoverde MD   HumaLOG U-100 Insulin 100 unit/mL injection Take 25 units before lunch and sliding scale at bedtime as directed 12/7/21   Chaya Campoverde MD   amLODIPine (NORVASC) 5 mg tablet Take 1 Tablet by mouth daily. Take 1 Tab by mouth daily.  12/7/21   Tae Dylan Mccrary MD   albuterol (PROVENTIL HFA, VENTOLIN HFA, PROAIR HFA) 90 mcg/actuation inhaler Take 2 Puffs by inhalation every four (4) hours as needed for Wheezing. 12/7/21   Sarah Baker MD   atorvastatin (LIPITOR) 20 mg tablet Take 1 Tablet by mouth daily. 12/7/21   Sarah Baker MD   losartan (COZAAR) 50 mg tablet Take 1 Tablet by mouth daily. Increased dose 8/16/21   Sarah Baker MD   fluticasone propion-salmeteroL (Advair Diskus) 500-50 mcg/dose diskus inhaler Take 1 Puff by inhalation two (2) times a day. 8/16/21   Sarah Baker MD   tiotropium bromide (SPIRIVA RESPIMAT) 2.5 mcg/actuation inhaler Take 2 Puffs by inhalation daily. 5/12/21   Sarah Baker MD   budesonide (PULMICORT) 1 mg/2 mL nbsp 2 mL by Nebulization route two (2) times daily as needed for Cough. Do not use on days when using Advair 5/12/21   Sarah Baker MD   magnesium oxide (MAG-OX) 400 mg tablet Take 1 Tab by mouth daily. 5/12/21   Sarah Baker MD   zinc sulfate (ZINC-220 PO) Take  by mouth daily. Provider, Historical   cetirizine (ZYRTEC) 10 mg tablet Take 10 mg by mouth daily as needed for Allergies. Provider, Historical   TRUEplus Insulin 1 mL 31 gauge x 5/16 syrg Use with insulin 12/17/20   Sarah Baker MD   aspirin delayed-release 81 mg tablet Take 81 mg by mouth daily. Provider, Historical   albuterol (PROVENTIL VENTOLIN) 2.5 mg /3 mL (0.083 %) nebu  10/21/20   Provider, Historical   ascorbic acid, vitamin C, (Vitamin C) 500 mg tablet Take 500 mg by mouth daily. Provider, Historical   hydroquinone (ESOTERICA, MELQUIN) 4 % topical cream Apply  to affected area two (2) times a day. Patient not taking: Reported on 10/26/2021 3/3/20   Sarah Baker MD   cranberry extract 450 mg tab tablet Take 450 mg by mouth daily.     Provider, Historical     Vitals:    06/01/22 2343 06/02/22 0545 06/02/22 0835 06/02/22 1630   BP: (!) 185/81 (!) 177/81 (!) 175/80 (!) 146/88   Pulse:  88 (!) 108 85   Resp:  16 16 18   Temp:  98.9 °F (37.2 °C) 99 °F (37.2 °C) 98.5 °F (36.9 °C)   SpO2:  99% 98% 97%   Weight:       Height:         Lab Results   Component Value Date/Time    WBC 8.0 06/02/2022 04:57 AM    HGB 13.1 06/02/2022 04:57 AM    HCT 38.9 06/02/2022 04:57 AM    PLATELET 546 28/32/8616 04:57 AM    MCV 88.2 06/02/2022 04:57 AM     Lab Results   Component Value Date/Time    Sodium 138 06/02/2022 04:57 AM    Potassium 3.6 06/02/2022 04:57 AM    Chloride 106 06/02/2022 04:57 AM    CO2 22 06/02/2022 04:57 AM    Anion gap 10 06/02/2022 04:57 AM    Glucose 131 (H) 06/02/2022 04:57 AM    BUN 9 06/02/2022 04:57 AM    Creatinine 0.75 06/02/2022 04:57 AM    BUN/Creatinine ratio 12 06/02/2022 04:57 AM    GFR est AA >60 06/02/2022 04:57 AM    GFR est non-AA >60 06/02/2022 04:57 AM    Calcium 8.7 06/02/2022 04:57 AM    Bilirubin, total 0.8 06/02/2022 04:57 AM    Alk. phosphatase 112 06/02/2022 04:57 AM    Protein, total 6.9 06/02/2022 04:57 AM    Albumin 3.2 (L) 06/02/2022 04:57 AM    Globulin 3.7 06/02/2022 04:57 AM    A-G Ratio 0.9 (L) 06/02/2022 04:57 AM    ALT (SGPT) 21 06/02/2022 04:57 AM    AST (SGOT) 15 06/02/2022 04:57 AM     No results found for: VALF2, VALAC, VALP, VALPR, DS6, CRBAM, CRBAMP, CARB2, XCRBAM  No results found for: LITHM  RADIOLOGY REPORTS:(reviewed/updated 6/2/2022)  ECHO ADULT COMPLETE    Result Date: 5/12/2022    Left Ventricle: Normal left ventricular systolic function with a visually estimated EF of 50 - 55%. EF by 2D Simpsons Biplane is 51%. Left ventricle size is normal. Increased wall thickness. Findings consistent with mild concentric hypertrophy. Normal wall motion. Grade I diastolic dysfunction with normal LAP.   Mitral Valve: Mild regurgitation.   Tricuspid Valve: The estimated RVSP is 18 mmHg.      Lab Results   Component Value Date/Time    HCG urine, QL NEGATIVE  07/14/2016 03:01 PM    Pregnancy test,urine (POC) Negative 06/01/2022 12:47 PM    HCG urine, Ql. (POC) Negative 04/14/2015 09:45 AM     MENTAL STATUS EXAM:  General appearance: moderately groomed, psychomotor activity is WNL  Eye contact: good  Speech: Spontaneous, soft, decreased output. Affect: full  Mood: \"better\"  Thought Process: Logical, goal directed  Perception: Denies AH or VH. Thought Content: denies SI/plan/intent, denies HI/plan/intent  Insight: Partial  Judgment: Fair  Cognition: Intact grossly. ASSESSMENT AND PLAN:  [facility] meets criteria for a diagnosis of adjustment disorder with mixed anxiety and depressed mood. -Ms. Erika Gonzalez does not wish to admit to inpatient psychiatry. However, due to her suicide attempt, she does meet criteria, even though she is currently denying SI. Please contact [address] [place] [address] at [phone] to arrange an evaluation for a TDO. Thank your your consult. Please feel free to consult us again as needed.

## 2022-06-03 ENCOUNTER — PATIENT OUTREACH (OUTPATIENT)
Dept: CASE MANAGEMENT | Age: 42
End: 2022-06-03

## 2022-06-03 LAB
SARS-COV-2, XPLCVT: NOT DETECTED
SOURCE, COVRS: NORMAL

## 2022-06-03 NOTE — PROGRESS NOTES
Care Transitions Initial Call    Call within 2 business days of discharge: Yes     Patient: Carter Nieves Patient : 1980 MRN: [id number]    Last Discharge [address]       Complaint Diagnosis Description Type Department Provider    24 Suicidal; Drug Overdose; Alcohol intoxication Intentional drug overdose, initial encounter [facility]) ED to Hosp-Admission (Discharged) (ADMIT) Hany Cordova MD; [place], C... Was this an external facility discharge? No     Challenges to be reviewed by the provider   Additional needs identified to be addressed with provider: yes    Refer to goals section- plan for hospital follow up and Medication ordered at discharge. Method of communication with provider : chart routing    Discussed COVID-19 related testing which was available at this time. Test results were negative. Patient informed of results, if available? aware     Advance Care Planning:   Does patient have an Advance Directive: not on file. Inpatient Readmission Risk score: Unplanned Readmit Risk Score: 7.5 ( )    Was this a readmission? no     Patients top risk factors for readmission: ineffective coping, medical condition- , support system and to be further assessed   Interventions to address risk factors: Assessment and support for treatment adherence and medication management-  and communication with PCP    Care Transition Nurse (CTN) contacted the patient by telephone to perform post hospital discharge assessment. Verified name and  with patient as identifiers. Provided introduction to self, and explanation of the CTN role. CTN reviewed discharge instructions, medical action plan and red flags with patient who verbalized understanding. Were discharge instructions available to patient? yes. Reviewed appropriate site of care based on symptoms and resources available to patient including: PCP, Specialist and [address] [place] [address] Road.  Patient given an opportunity to ask questions and does not have any further questions or concerns at this time. The patient agrees to contact the PCP office for questions related to their healthcare. Medication reconciliation was performed with patient, who verbalizes understanding of administration of home medications. Advised obtaining a 90-day supply of all daily and as-needed medications. Referral to Pharm D needed: no     Home Health/Outpatient orders at discharge: none    Durable Medical Equipment ordered at discharge: None    Was patient discharged with a pulse oximeter? NA    Discussed follow-up appointments. If no appointment was previously scheduled, appointment scheduling offered: yes. Is follow up appointment scheduled within 7 days of discharge? yes. [facility] follow up appointment(s):   Future Appointments   Date Time Provider Alex Kilpatrick   6/8/2022  3:00 PM Yadira Glaser MD [facility]   [facility]20[facility]  9[facility]     [facility] follow up appointment(s):   Pulmonary- Dr. Lavelle Jaquez for follow-up call in 5-7 days based on severity of symptoms and risk factors. Plan for next call:   · Assess current symptoms including daily monitoring items  · Attended follow up with PCP- plan for  follow up  · Appointment in place with cardiology and pulmonary    CTN provided contact information for future needs. Goals Addressed                 This Visit's Progress       General     Reduce Risk of Hospitalization        6/3/22- return call from Ms. Lee. She states she \"feeling wonderful, great\". Later reports that her \"finace is awake and she has spoken with him\". She reports that her mother Ricardo Youssef worked a long time as a nurse in psychiatry and she is currently her PCP's nurse\". She has scheduled VV with her PCP for today. She reports that due to recent stressors and possible reaction to prozac that she had \"the suicidal ideations\".  The PCP is managing her Mental health medications and states her mom is scheduling her follow up appointments with providers: cardiology, pulmonary and mental health. She is not able to drive due to right eye- either mom or sister takes her to the appointments. She admits that for the past two weeks she not taken good care of herself; did not eat and not taking her medications. She states she is back on track. Ate all her meals and taking her meds since discharge. She is aware that her BP values were elevated in the hospital.   Mother has gotten her a BP cuff and she takes her BP values and reports for her VV with PCP. She has recently found out that her condo has mold- plans on scheduling with pulmonary as well. She sees Dr. Genevieve Lucia and Becky Matson with PAR. Pt will remain out of the hospital or ER for remainder of BLAIR period.             [employer]

## 2022-06-03 NOTE — PROGRESS NOTES
Bedside and Verbal shift change report given to [address] [place] [address] (oncoming nurse) by Freddy Ashraf RN (offgoing nurse). Report included the following information SBAR, Procedure Summary, Intake/Output and MAR.

## 2022-06-08 ENCOUNTER — VIRTUAL VISIT (OUTPATIENT)
Dept: FAMILY MEDICINE CLINIC | Age: 42
End: 2022-06-08
Payer: COMMERCIAL

## 2022-06-08 DIAGNOSIS — F43.21 ADJUSTMENT DISORDER WITH DEPRESSED MOOD: ICD-10-CM

## 2022-06-08 DIAGNOSIS — F43.0 ANXIETY AS ACUTE REACTION TO EXCEPTIONAL STRESS: ICD-10-CM

## 2022-06-08 DIAGNOSIS — Z09 HOSPITAL DISCHARGE FOLLOW-UP: Primary | ICD-10-CM

## 2022-06-08 DIAGNOSIS — F41.1 ANXIETY AS ACUTE REACTION TO EXCEPTIONAL STRESS: ICD-10-CM

## 2022-06-08 DIAGNOSIS — T50.902A OVERDOSE, INTENTIONAL SELF-HARM, INITIAL ENCOUNTER (HCC): ICD-10-CM

## 2022-06-08 PROCEDURE — 1111F DSCHRG MED/CURRENT MED MERGE: CPT | Performed by: FAMILY MEDICINE

## 2022-06-08 PROCEDURE — 99214 OFFICE O/P EST MOD 30 MIN: CPT | Performed by: FAMILY MEDICINE

## 2022-06-08 RX ORDER — ESCITALOPRAM OXALATE 10 MG/1
TABLET ORAL
Qty: 30 TABLET | Refills: 0 | Status: SHIPPED | OUTPATIENT
Start: 2022-06-08 | End: 2022-06-08 | Stop reason: SDUPTHER

## 2022-06-08 RX ORDER — ESCITALOPRAM OXALATE 10 MG/1
TABLET ORAL
Qty: 30 TABLET | Refills: 0 | Status: SHIPPED | OUTPATIENT
Start: 2022-06-08 | End: 2022-06-21 | Stop reason: ALTCHOICE

## 2022-06-08 NOTE — PROGRESS NOTES
Chief Complaint   Patient presents with   [facility] Follow Up     Admitted 6/1/22 Discharged 6/2/22   1. Have you been to the ER, urgent care clinic since your last visit? Hospitalized since your last visit? Yes Where: [facility]    2. Have you seen or consulted any other health care providers outside of the [address] [place] [address] since your last visit? Include any pap smears or colon screening.  No
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
## 2022-06-16 ENCOUNTER — APPOINTMENT (OUTPATIENT)
Dept: CT IMAGING | Age: 42
DRG: 069 | End: 2022-06-16
Attending: STUDENT IN AN ORGANIZED HEALTH CARE EDUCATION/TRAINING PROGRAM
Payer: COMMERCIAL

## 2022-06-16 ENCOUNTER — APPOINTMENT (OUTPATIENT)
Dept: CT IMAGING | Age: 42
DRG: 069 | End: 2022-06-16
Attending: FAMILY MEDICINE
Payer: COMMERCIAL

## 2022-06-16 ENCOUNTER — HOSPITAL ENCOUNTER (INPATIENT)
Age: 42
LOS: 2 days | Discharge: HOME | DRG: 069 | End: 2022-06-18
Attending: STUDENT IN AN ORGANIZED HEALTH CARE EDUCATION/TRAINING PROGRAM | Admitting: FAMILY MEDICINE
Payer: COMMERCIAL

## 2022-06-16 ENCOUNTER — APPOINTMENT (OUTPATIENT)
Dept: MRI IMAGING | Age: 42
DRG: 069 | End: 2022-06-16
Attending: FAMILY MEDICINE
Payer: COMMERCIAL

## 2022-06-16 DIAGNOSIS — I10 PRIMARY HYPERTENSION: ICD-10-CM

## 2022-06-16 DIAGNOSIS — G45.9 TIA (TRANSIENT ISCHEMIC ATTACK): ICD-10-CM

## 2022-06-16 DIAGNOSIS — R47.01 APHASIA: Primary | ICD-10-CM

## 2022-06-16 LAB
ALBUMIN SERPL-MCNC: 4.6 G/DL (ref 3.5–5.2)
ALBUMIN/GLOB SERPL: 1.8 {RATIO} (ref 1.1–2.2)
ALP SERPL-CCNC: 136 U/L (ref 35–104)
ALT SERPL-CCNC: 30 U/L (ref 10–35)
ANION GAP SERPL CALC-SCNC: 10 MMOL/L (ref 5–15)
AST SERPL-CCNC: 25 U/L (ref 10–35)
BASOPHILS # BLD: 0.1 K/UL (ref 0–0.1)
BASOPHILS NFR BLD: 1 % (ref 0–1)
BILIRUB SERPL-MCNC: 0.5 MG/DL (ref 0.2–1)
BUN SERPL-MCNC: 8 MG/DL (ref 6–20)
BUN/CREAT SERPL: 11 (ref 12–20)
CALCIUM SERPL-MCNC: 9.9 MG/DL (ref 8.6–10)
CHLORIDE SERPL-SCNC: 101 MMOL/L (ref 98–107)
CO2 SERPL-SCNC: 26 MMOL/L (ref 22–29)
COMMENT, HOLDF: NORMAL
CREAT SERPL-MCNC: 0.72 MG/DL (ref 0.5–0.9)
DIFFERENTIAL METHOD BLD: NORMAL
EOSINOPHIL # BLD: 0.2 K/UL (ref 0–0.4)
EOSINOPHIL NFR BLD: 3 % (ref 0–7)
ERYTHROCYTE [DISTWIDTH] IN BLOOD BY AUTOMATED COUNT: 14 % (ref 11.5–14.5)
GLOBULIN SER CALC-MCNC: 2.5 G/DL (ref 2–4)
GLUCOSE BLD STRIP.AUTO-MCNC: 126 MG/DL (ref 65–117)
GLUCOSE BLD STRIP.AUTO-MCNC: 141 MG/DL (ref 65–117)
GLUCOSE SERPL-MCNC: 140 MG/DL (ref 65–100)
HCT VFR BLD AUTO: 40 % (ref 35–47)
HGB BLD-MCNC: 13.4 G/DL (ref 11.5–16)
IMM GRANULOCYTES # BLD AUTO: 0 K/UL (ref 0–0.04)
IMM GRANULOCYTES NFR BLD AUTO: 0 % (ref 0–0.5)
INR PPP: 1.1 (ref 0.9–1.1)
LYMPHOCYTES # BLD: 3 K/UL (ref 0.8–3.5)
LYMPHOCYTES NFR BLD: 37 % (ref 12–49)
MCH RBC QN AUTO: 29.3 PG (ref 26–34)
MCHC RBC AUTO-ENTMCNC: 33.5 G/DL (ref 30–36.5)
MCV RBC AUTO: 87.5 FL (ref 80–99)
MONOCYTES # BLD: 0.6 K/UL (ref 0–1)
MONOCYTES NFR BLD: 8 % (ref 5–13)
NEUTS SEG # BLD: 4.2 K/UL (ref 1.8–8)
NEUTS SEG NFR BLD: 52 % (ref 32–75)
NRBC # BLD: 0 K/UL (ref 0–0.01)
NRBC BLD-RTO: 0 PER 100 WBC
PLATELET # BLD AUTO: 341 K/UL (ref 150–400)
PMV BLD AUTO: 10.2 FL (ref 8.9–12.9)
POTASSIUM SERPL-SCNC: 4.1 MMOL/L (ref 3.5–5.1)
PROT SERPL-MCNC: 7.1 G/DL (ref 6.4–8.3)
PROTHROMBIN TIME: 14.3 SEC (ref 11.9–14.6)
RBC # BLD AUTO: 4.57 M/UL (ref 3.8–5.2)
SAMPLES BEING HELD,HOLD: NORMAL
SERVICE CMNT-IMP: ABNORMAL
SERVICE CMNT-IMP: ABNORMAL
SODIUM SERPL-SCNC: 137 MMOL/L (ref 136–145)
WBC # BLD AUTO: 8 K/UL (ref 3.6–11)

## 2022-06-16 PROCEDURE — 85025 COMPLETE CBC W/AUTO DIFF WBC: CPT

## 2022-06-16 PROCEDURE — 82962 GLUCOSE BLOOD TEST: CPT

## 2022-06-16 PROCEDURE — 70450 CT HEAD/BRAIN W/O DYE: CPT

## 2022-06-16 PROCEDURE — 93005 ELECTROCARDIOGRAM TRACING: CPT

## 2022-06-16 PROCEDURE — G0378 HOSPITAL OBSERVATION PER HR: HCPCS

## 2022-06-16 PROCEDURE — 36415 COLL VENOUS BLD VENIPUNCTURE: CPT

## 2022-06-16 PROCEDURE — 74011250636 HC RX REV CODE- 250/636: Performed by: FAMILY MEDICINE

## 2022-06-16 PROCEDURE — 85610 PROTHROMBIN TIME: CPT

## 2022-06-16 PROCEDURE — 65270000046 HC RM TELEMETRY

## 2022-06-16 PROCEDURE — 99285 EMERGENCY DEPT VISIT HI MDM: CPT

## 2022-06-16 PROCEDURE — 80053 COMPREHEN METABOLIC PANEL: CPT

## 2022-06-16 RX ORDER — SODIUM CHLORIDE 9 MG/ML
100 INJECTION, SOLUTION INTRAVENOUS CONTINUOUS
Status: DISCONTINUED | OUTPATIENT
Start: 2022-06-16 | End: 2022-06-17

## 2022-06-16 RX ORDER — PANTOPRAZOLE SODIUM 40 MG/1
40 TABLET, DELAYED RELEASE ORAL
Status: DISCONTINUED | OUTPATIENT
Start: 2022-06-17 | End: 2022-06-18 | Stop reason: HOSPADM

## 2022-06-16 RX ORDER — MAGNESIUM SULFATE 100 %
4 CRYSTALS MISCELLANEOUS AS NEEDED
Status: DISCONTINUED | OUTPATIENT
Start: 2022-06-16 | End: 2022-06-18 | Stop reason: HOSPADM

## 2022-06-16 RX ORDER — MONTELUKAST SODIUM 10 MG/1
10 TABLET ORAL DAILY
Status: DISCONTINUED | OUTPATIENT
Start: 2022-06-17 | End: 2022-06-18 | Stop reason: HOSPADM

## 2022-06-16 RX ORDER — LOSARTAN POTASSIUM 50 MG/1
50 TABLET ORAL DAILY
Status: DISCONTINUED | OUTPATIENT
Start: 2022-06-17 | End: 2022-06-17

## 2022-06-16 RX ORDER — DEXTROSE MONOHYDRATE 100 MG/ML
0-250 INJECTION, SOLUTION INTRAVENOUS AS NEEDED
Status: DISCONTINUED | OUTPATIENT
Start: 2022-06-16 | End: 2022-06-18 | Stop reason: HOSPADM

## 2022-06-16 RX ORDER — ESCITALOPRAM OXALATE 10 MG/1
5 TABLET ORAL DAILY
Status: DISCONTINUED | OUTPATIENT
Start: 2022-06-17 | End: 2022-06-18 | Stop reason: HOSPADM

## 2022-06-16 RX ORDER — CLONAZEPAM 0.5 MG/1
0.5 TABLET ORAL
Status: DISCONTINUED | OUTPATIENT
Start: 2022-06-16 | End: 2022-06-18 | Stop reason: HOSPADM

## 2022-06-16 RX ORDER — ASPIRIN 81 MG/1
81 TABLET ORAL DAILY
Status: DISCONTINUED | OUTPATIENT
Start: 2022-06-17 | End: 2022-06-16 | Stop reason: SDUPTHER

## 2022-06-16 RX ORDER — AMLODIPINE BESYLATE 5 MG/1
5 TABLET ORAL DAILY
Status: DISCONTINUED | OUTPATIENT
Start: 2022-06-17 | End: 2022-06-17

## 2022-06-16 RX ORDER — ATORVASTATIN CALCIUM 20 MG/1
20 TABLET, FILM COATED ORAL DAILY
Status: DISCONTINUED | OUTPATIENT
Start: 2022-06-17 | End: 2022-06-18 | Stop reason: HOSPADM

## 2022-06-16 RX ORDER — CETIRIZINE HCL 10 MG
10 TABLET ORAL
Status: DISCONTINUED | OUTPATIENT
Start: 2022-06-16 | End: 2022-06-18 | Stop reason: HOSPADM

## 2022-06-16 RX ORDER — IPRATROPIUM BROMIDE AND ALBUTEROL SULFATE 2.5; .5 MG/3ML; MG/3ML
3 SOLUTION RESPIRATORY (INHALATION)
Status: DISCONTINUED | OUTPATIENT
Start: 2022-06-16 | End: 2022-06-18 | Stop reason: HOSPADM

## 2022-06-16 RX ORDER — ACETAMINOPHEN 650 MG/1
650 SUPPOSITORY RECTAL
Status: DISCONTINUED | OUTPATIENT
Start: 2022-06-16 | End: 2022-06-18 | Stop reason: HOSPADM

## 2022-06-16 RX ORDER — ACETAMINOPHEN 325 MG/1
650 TABLET ORAL
Status: DISCONTINUED | OUTPATIENT
Start: 2022-06-16 | End: 2022-06-18 | Stop reason: HOSPADM

## 2022-06-16 RX ORDER — INSULIN GLARGINE 100 [IU]/ML
0.2 INJECTION, SOLUTION SUBCUTANEOUS
Status: DISCONTINUED | OUTPATIENT
Start: 2022-06-17 | End: 2022-06-17

## 2022-06-16 RX ORDER — GLIPIZIDE 5 MG/1
5 TABLET ORAL
Status: DISCONTINUED | OUTPATIENT
Start: 2022-06-17 | End: 2022-06-18 | Stop reason: HOSPADM

## 2022-06-16 RX ORDER — HEPARIN SODIUM 5000 [USP'U]/ML
5000 INJECTION, SOLUTION INTRAVENOUS; SUBCUTANEOUS EVERY 8 HOURS
Status: DISCONTINUED | OUTPATIENT
Start: 2022-06-16 | End: 2022-06-17

## 2022-06-16 RX ORDER — ASPIRIN 325 MG
325 TABLET ORAL DAILY
Status: DISCONTINUED | OUTPATIENT
Start: 2022-06-17 | End: 2022-06-18 | Stop reason: HOSPADM

## 2022-06-16 RX ORDER — IPRATROPIUM BROMIDE 0.5 MG/2.5ML
0.5 SOLUTION RESPIRATORY (INHALATION)
Status: DISCONTINUED | OUTPATIENT
Start: 2022-06-17 | End: 2022-06-17

## 2022-06-16 RX ORDER — HYDRALAZINE HYDROCHLORIDE 20 MG/ML
20 INJECTION INTRAMUSCULAR; INTRAVENOUS
Status: DISCONTINUED | OUTPATIENT
Start: 2022-06-16 | End: 2022-06-18 | Stop reason: HOSPADM

## 2022-06-16 RX ORDER — BUDESONIDE 0.5 MG/2ML
1000 INHALANT ORAL
Status: DISCONTINUED | OUTPATIENT
Start: 2022-06-17 | End: 2022-06-17

## 2022-06-16 RX ADMIN — HEPARIN SODIUM 5000 UNITS: 5000 INJECTION INTRAVENOUS; SUBCUTANEOUS at 21:34

## 2022-06-16 RX ADMIN — SODIUM CHLORIDE 100 ML/HR: 9 INJECTION, SOLUTION INTRAVENOUS at 21:34

## 2022-06-16 RX ADMIN — HYDRALAZINE HYDROCHLORIDE 20 MG: 20 INJECTION INTRAMUSCULAR; INTRAVENOUS at 22:35

## 2022-06-16 NOTE — ED PROVIDER NOTES
Patient is a 53-year-old female presented emergency department for aphasia. Patient was at home on the phone with Kate Hurtado as they were getting ready to cut her power off at into a disagreement with sudden had aphasia and upper or lower extremity numbness and tingling. This occurred approximately 1 hour prior to arrival.  Patient cannot recall what time it was was able to talk in complete sentences patient has a history of CVA currently denies any upper or lower extremity weakness numbness or tingling.            Past Medical History:   Diagnosis Date    Alcohol abuse     Anxiety and depression     Asthma     DM (diabetes mellitus), type 2 ([facility]     Morbid obesity ([facility] [address] 10/28/2014       Past Surgical History:   Procedure Laterality Date    HX GYN      3 C-sections    HX GYN      Miscarriage         Family History:   Problem Relation Age of Onset    Hypertension Maternal Aunt     Hypertension Maternal Uncle     Thyroid Disease Maternal Uncle     Hypertension Maternal Grandmother     Cancer Maternal Grandfather     Hypertension Maternal Grandfather     Diabetes Paternal Grandmother     Cancer Paternal Grandmother     Hypertension Mother     Alcohol abuse Father     Substance Abuse Father     Hypertension Maternal Aunt     Hypertension Maternal Uncle     Stroke Maternal Uncle     Hypertension Maternal Uncle     Alcohol abuse Maternal Uncle     Hypertension Maternal Uncle     Hypertension Maternal Uncle     Arrhythmia Maternal Uncle        Social History     Socioeconomic History    Marital status: SINGLE     Spouse name: Not on file    Number of children: Not on file    Years of education: Not on file    Highest education level: Not on file   Occupational History    Not on file   Tobacco Use    Smoking status: Current Every Day Smoker     Packs/day: 1.00     Types: Cigarettes     Start date: 2014     Last attempt to quit: 2016     Years since quittin.4    Smokeless tobacco: Never Used   Vaping Use    Vaping Use: Never used   Substance and Sexual Activity    Alcohol use: Yes     Alcohol/week: 0.0 standard drinks     Comment: daily    Drug use: No    Sexual activity: Yes     Partners: Male   Other Topics Concern    Not on file   Social History Narrative    Not on file     Social Determinants of Health     Financial Resource Strain: High Risk    Difficulty of Paying Living Expenses: Hard   Food Insecurity: Food Insecurity Present    Worried About Running Out of Food in the Last Year: Sometimes true    Roxanna of Food in the Last Year: Sometimes true   Transportation Needs:     Lack of Transportation (Medical): Not on file    Lack of Transportation (Non-Medical): Not on file   Physical Activity:     Days of Exercise per Week: Not on file    Minutes of Exercise per Session: Not on file   Stress:     Feeling of Stress : Not on file   Social Connections:     Frequency of Communication with Friends and Family: Not on file    Frequency of Social Gatherings with Friends and Family: Not on file    Attends Jain Services: Not on file    Active Member of [address] [place] [address] or Organizations: Not on file    Attends Club or Organization Meetings: Not on file    Marital Status: Not on file   Intimate Partner Violence:     Fear of Current or Ex-Partner: Not on file    Emotionally Abused: Not on file    Physically Abused: Not on file    Sexually Abused: Not on file   Housing Stability:     Unable to Pay for Housing in the Last Year: Not on file    Number of [place] in the Last Year: Not on file    Unstable Housing in the Last Year: Not on file         ALLERGIES: Metformin and Pcn [penicillins]    Review of Systems   Neurological: Positive for speech difficulty, weakness and numbness. Psychiatric/Behavioral: The patient is nervous/anxious. All other systems reviewed and are negative.       Vitals:    06/16/22 1754 06/16/22 1802 06/16/22 1809 06/16/22 1830   BP: (!) 169/94 (!) 143/93 (!) 158/90 (!) 171/82   Pulse: 83 95 86 80   Resp: 20 22 16 19   Temp:       SpO2: 94% 98% 96% 98%   Weight:       Height:                Physical Exam  Vitals and nursing note reviewed. Constitutional:       Appearance: Normal appearance. She is morbidly obese. HENT:      Head: Normocephalic and atraumatic. Eyes:      Extraocular Movements: Extraocular movements intact. Pupils: Pupils are equal, round, and reactive to light. Cardiovascular:      Rate and Rhythm: Normal rate and regular rhythm. Pulses: Normal pulses. Heart sounds: Normal heart sounds. Pulmonary:      Effort: Pulmonary effort is normal.      Breath sounds: Normal breath sounds. Abdominal:      Palpations: Abdomen is soft. Musculoskeletal:         General: Normal range of motion. Cervical back: Normal range of motion and neck supple. Skin:     General: Skin is warm and dry. Neurological:      General: No focal deficit present. Mental Status: She is alert and oriented to person, place, and time. Cranial Nerves: No cranial nerve deficit. Sensory: No sensory deficit. Motor: No weakness. Coordination: Coordination normal.      Gait: Gait normal.   Psychiatric:         Mood and Affect: Mood normal.         Behavior: Behavior normal.          MDM  Number of Diagnoses or Management Options  Aphasia  Diagnosis management comments: Patient is a 53-year-old female present emergency department with episode of aphasia, upper and lower extremity numbness and tingling likely reactive to anxiety, panic attack however patient does have a history of CVA Code S level 1 called patient received CT head without contrast shows no acute abnormalities teleneurology has seen and evaluated the patient agree however due to patient's history of CVA admission for TIA work-up is warranted. Patient will be admitted.          Procedures        Perfect Serve Consult for Admission  6:55 PM    ED Room Number: P47/T13  Patient Name and age:  Dione Ham 38 y.o.  female  Working Diagnosis:   1. Aphasia        COVID-19 Suspicion:  no  Sepsis present:  no  Reassessment needed: yes  Code Status:  Full Code  Readmission: yes  Isolation Requirements:  no  Recommended Level of Care:  telemetry  [facility]   Other: Total critical care time spent exclusive of procedures:  40 min.

## 2022-06-16 NOTE — ED NOTES
Pt arrives in the ED via EMS for complaints of numbness to face and aphasia lasting approximately 20 minutes. Pt with complete resolution of symptoms on arrival to ED. Pt's blood glucose 137 on arrival to ED. Pt speaking clearly and in no acute distress.

## 2022-06-16 NOTE — Clinical Note
Patient Class[de-identified] OBSERVATION [104]   Type of Bed: Remote Telemetry [29]   Cardiac Monitoring Required?: Yes   Reason for Observation: TIA   Admitting Diagnosis: TIA (transient ischemic attack) [249822]   Admitting Physician: Allie Warren [[id number]]   Attending Physician: Allie Warren [[id number]]

## 2022-06-16 NOTE — ED NOTES
Code S Level 1 called by Raina Mack RN to Teleneurologist.  Code S Level 1 called by this RN to [facility]. Patient in CT with [employer].

## 2022-06-17 ENCOUNTER — APPOINTMENT (OUTPATIENT)
Dept: CT IMAGING | Age: 42
DRG: 069 | End: 2022-06-17
Attending: FAMILY MEDICINE
Payer: COMMERCIAL

## 2022-06-17 LAB
ASPIRIN TEST, ASPIRN: 391 ARU
CHOLEST SERPL-MCNC: 111 MG/DL
GLUCOSE BLD STRIP.AUTO-MCNC: 157 MG/DL (ref 65–117)
GLUCOSE BLD STRIP.AUTO-MCNC: 162 MG/DL (ref 65–117)
HDLC SERPL-MCNC: 42 MG/DL
HDLC SERPL: 2.6 {RATIO} (ref 0–5)
LDLC SERPL CALC-MCNC: 39.8 MG/DL (ref 0–100)
SERVICE CMNT-IMP: ABNORMAL
SERVICE CMNT-IMP: ABNORMAL
TRIGL SERPL-MCNC: 146 MG/DL (ref ?–150)
VLDLC SERPL CALC-MCNC: 29.2 MG/DL

## 2022-06-17 PROCEDURE — 85576 BLOOD PLATELET AGGREGATION: CPT

## 2022-06-17 PROCEDURE — 80061 LIPID PANEL: CPT

## 2022-06-17 PROCEDURE — 97116 GAIT TRAINING THERAPY: CPT

## 2022-06-17 PROCEDURE — 97161 PT EVAL LOW COMPLEX 20 MIN: CPT

## 2022-06-17 PROCEDURE — 74011250637 HC RX REV CODE- 250/637: Performed by: HOSPITALIST

## 2022-06-17 PROCEDURE — 97112 NEUROMUSCULAR REEDUCATION: CPT

## 2022-06-17 PROCEDURE — 74011250637 HC RX REV CODE- 250/637: Performed by: FAMILY MEDICINE

## 2022-06-17 PROCEDURE — 0042T CT CODE NEURO PERF W CBF: CPT

## 2022-06-17 PROCEDURE — 4A03X5D MEASUREMENT OF ARTERIAL FLOW, INTRACRANIAL, EXTERNAL APPROACH: ICD-10-PCS | Performed by: STUDENT IN AN ORGANIZED HEALTH CARE EDUCATION/TRAINING PROGRAM

## 2022-06-17 PROCEDURE — 82962 GLUCOSE BLOOD TEST: CPT

## 2022-06-17 PROCEDURE — 65270000046 HC RM TELEMETRY

## 2022-06-17 PROCEDURE — 74011000250 HC RX REV CODE- 250: Performed by: FAMILY MEDICINE

## 2022-06-17 PROCEDURE — 99222 1ST HOSP IP/OBS MODERATE 55: CPT | Performed by: PSYCHIATRY & NEUROLOGY

## 2022-06-17 PROCEDURE — 70496 CT ANGIOGRAPHY HEAD: CPT

## 2022-06-17 PROCEDURE — 74011000636 HC RX REV CODE- 636: Performed by: RADIOLOGY

## 2022-06-17 PROCEDURE — 74011250636 HC RX REV CODE- 250/636: Performed by: FAMILY MEDICINE

## 2022-06-17 PROCEDURE — 97165 OT EVAL LOW COMPLEX 30 MIN: CPT

## 2022-06-17 PROCEDURE — 74011250636 HC RX REV CODE- 250/636: Performed by: HOSPITALIST

## 2022-06-17 PROCEDURE — 74011250637 HC RX REV CODE- 250/637: Performed by: STUDENT IN AN ORGANIZED HEALTH CARE EDUCATION/TRAINING PROGRAM

## 2022-06-17 PROCEDURE — 36415 COLL VENOUS BLD VENIPUNCTURE: CPT

## 2022-06-17 RX ORDER — LANOLIN ALCOHOL/MO/W.PET/CERES
6 CREAM (GRAM) TOPICAL
Status: DISCONTINUED | OUTPATIENT
Start: 2022-06-17 | End: 2022-06-18 | Stop reason: HOSPADM

## 2022-06-17 RX ORDER — CLOPIDOGREL BISULFATE 75 MG/1
300 TABLET ORAL ONCE
Status: COMPLETED | OUTPATIENT
Start: 2022-06-17 | End: 2022-06-17

## 2022-06-17 RX ORDER — ENOXAPARIN SODIUM 100 MG/ML
40 INJECTION SUBCUTANEOUS EVERY 12 HOURS
Status: DISCONTINUED | OUTPATIENT
Start: 2022-06-17 | End: 2022-06-18 | Stop reason: HOSPADM

## 2022-06-17 RX ORDER — ENOXAPARIN SODIUM 100 MG/ML
40 INJECTION SUBCUTANEOUS EVERY 24 HOURS
Status: DISCONTINUED | OUTPATIENT
Start: 2022-06-17 | End: 2022-06-17 | Stop reason: DRUGHIGH

## 2022-06-17 RX ORDER — POLYETHYLENE GLYCOL 3350 17 G/17G
17 POWDER, FOR SOLUTION ORAL DAILY PRN
Status: DISCONTINUED | OUTPATIENT
Start: 2022-06-17 | End: 2022-06-18 | Stop reason: HOSPADM

## 2022-06-17 RX ORDER — SODIUM CHLORIDE 0.9 % (FLUSH) 0.9 %
5-40 SYRINGE (ML) INJECTION AS NEEDED
Status: DISCONTINUED | OUTPATIENT
Start: 2022-06-17 | End: 2022-06-18 | Stop reason: HOSPADM

## 2022-06-17 RX ORDER — SODIUM CHLORIDE 0.9 % (FLUSH) 0.9 %
5-40 SYRINGE (ML) INJECTION EVERY 8 HOURS
Status: DISCONTINUED | OUTPATIENT
Start: 2022-06-17 | End: 2022-06-18 | Stop reason: HOSPADM

## 2022-06-17 RX ORDER — AMLODIPINE BESYLATE 5 MG/1
10 TABLET ORAL DAILY
Status: DISCONTINUED | OUTPATIENT
Start: 2022-06-17 | End: 2022-06-18 | Stop reason: HOSPADM

## 2022-06-17 RX ORDER — LOSARTAN POTASSIUM 50 MG/1
100 TABLET ORAL DAILY
Status: DISCONTINUED | OUTPATIENT
Start: 2022-06-18 | End: 2022-06-18 | Stop reason: HOSPADM

## 2022-06-17 RX ORDER — BUDESONIDE 0.5 MG/2ML
1000 INHALANT ORAL
Status: DISCONTINUED | OUTPATIENT
Start: 2022-06-17 | End: 2022-06-18 | Stop reason: HOSPADM

## 2022-06-17 RX ORDER — LABETALOL 100 MG/1
100 TABLET, FILM COATED ORAL 2 TIMES DAILY
Status: DISCONTINUED | OUTPATIENT
Start: 2022-06-17 | End: 2022-06-18 | Stop reason: HOSPADM

## 2022-06-17 RX ORDER — ONDANSETRON 2 MG/ML
4 INJECTION INTRAMUSCULAR; INTRAVENOUS
Status: DISCONTINUED | OUTPATIENT
Start: 2022-06-17 | End: 2022-06-18 | Stop reason: HOSPADM

## 2022-06-17 RX ORDER — ONDANSETRON 4 MG/1
4 TABLET, ORALLY DISINTEGRATING ORAL
Status: DISCONTINUED | OUTPATIENT
Start: 2022-06-17 | End: 2022-06-18 | Stop reason: HOSPADM

## 2022-06-17 RX ORDER — IPRATROPIUM BROMIDE 0.5 MG/2.5ML
0.5 SOLUTION RESPIRATORY (INHALATION)
Status: DISCONTINUED | OUTPATIENT
Start: 2022-06-17 | End: 2022-06-18 | Stop reason: HOSPADM

## 2022-06-17 RX ADMIN — CLONAZEPAM 0.5 MG: 0.5 TABLET ORAL at 20:34

## 2022-06-17 RX ADMIN — ACETAMINOPHEN 650 MG: 325 TABLET ORAL at 00:54

## 2022-06-17 RX ADMIN — CLOPIDOGREL BISULFATE 300 MG: 75 TABLET ORAL at 00:54

## 2022-06-17 RX ADMIN — HEPARIN SODIUM 5000 UNITS: 5000 INJECTION INTRAVENOUS; SUBCUTANEOUS at 06:24

## 2022-06-17 RX ADMIN — SODIUM CHLORIDE 100 ML/HR: 9 INJECTION, SOLUTION INTRAVENOUS at 06:28

## 2022-06-17 RX ADMIN — ASPIRIN 325 MG: 325 TABLET ORAL at 09:12

## 2022-06-17 RX ADMIN — AMLODIPINE BESYLATE 10 MG: 5 TABLET ORAL at 09:12

## 2022-06-17 RX ADMIN — Medication 6 MG: at 23:52

## 2022-06-17 RX ADMIN — ATORVASTATIN CALCIUM 20 MG: 20 TABLET, FILM COATED ORAL at 09:12

## 2022-06-17 RX ADMIN — MONTELUKAST 10 MG: 10 TABLET, FILM COATED ORAL at 09:12

## 2022-06-17 RX ADMIN — CLONAZEPAM 0.5 MG: 0.5 TABLET ORAL at 00:54

## 2022-06-17 RX ADMIN — SODIUM CHLORIDE, PRESERVATIVE FREE 10 ML: 5 INJECTION INTRAVENOUS at 13:35

## 2022-06-17 RX ADMIN — LABETALOL HYDROCHLORIDE 100 MG: 100 TABLET, FILM COATED ORAL at 17:32

## 2022-06-17 RX ADMIN — SODIUM CHLORIDE, PRESERVATIVE FREE 10 ML: 5 INJECTION INTRAVENOUS at 20:35

## 2022-06-17 RX ADMIN — PANTOPRAZOLE SODIUM 40 MG: 40 TABLET, DELAYED RELEASE ORAL at 06:25

## 2022-06-17 RX ADMIN — ACETAMINOPHEN 650 MG: 325 TABLET ORAL at 13:34

## 2022-06-17 RX ADMIN — ACETAMINOPHEN 650 MG: 325 TABLET ORAL at 20:34

## 2022-06-17 RX ADMIN — IOPAMIDOL 140 ML: 755 INJECTION, SOLUTION INTRAVENOUS at 00:26

## 2022-06-17 RX ADMIN — ESCITALOPRAM OXALATE 5 MG: 10 TABLET ORAL at 09:12

## 2022-06-17 RX ADMIN — ENOXAPARIN SODIUM 40 MG: 100 INJECTION SUBCUTANEOUS at 13:34

## 2022-06-17 RX ADMIN — LOSARTAN POTASSIUM 50 MG: 50 TABLET, FILM COATED ORAL at 09:12

## 2022-06-17 RX ADMIN — LABETALOL HYDROCHLORIDE 100 MG: 100 TABLET, FILM COATED ORAL at 09:12

## 2022-06-17 NOTE — PROGRESS NOTES
Enoxaparin 40 mg SQ Q24H adjusted to 40 mg SQ Q12H (weight 151-174 kg, CrCl > 30 mL/min) per protocol    Thank you,  Nikko ORDAZ [facility]

## 2022-06-17 NOTE — PROGRESS NOTES
Responded to RRT for CODE STROKE VAN score: Positive. Pt arrived from chest ED with symptoms of L sided weakness and aphasia. Facial droop noted to left side. Unable to lift left arm. Symptoms began at approximately 2324 during trasport. Pt to CT at this time. BP elevated 217/110.    0020: Pt symptoms resolved at this time. No facial droop, able to move all extremities with no drift, speech clear. Alert and oriented. Provider at bedside: yes    Interventions ordered:  Other (Comment)    Sepsis Suspected: no    Transfer to Higher Level of Care: no    Blood Glucose:126    Visit Vitals  BP (!) 169/88 (BP 1 Location: Left upper arm, BP Patient Position: Sitting)   Pulse (!) 104   Temp 98.6 °F (37 °C)   Resp 20   Ht 5' 4\" (1.626 m)   Wt 151 kg (333 lb)   SpO2 98%   BMI 57.16 kg/m²        John Schroeder RN

## 2022-06-17 NOTE — ED NOTES
Attempted to call report to [address] [place] [address]. Was told the RN will call back shortly.

## 2022-06-17 NOTE — H&P
Admission History and Physical        Patient: Matthew Noonan               Sex: female          DOA: 2022       YOB: 1980      Age:  38 y.o.        LOS:  LOS: 1 day      HPI:     CHIEF COMPLAINT:   Left sided weakness    Aphasia   Code Stroke     HISTORY OF PRESENT ILLNESS:     Ms. Palmira Matta is a 38 y.o.  female who presents with past medical history of uncontrolled hypertension, diabetes Mellitus, asthma and Morbid obesity who presented to the emergency department at [facility] because of aphasia Patient was at home on the phone with [employer] as they were getting ready to cut her power off she went into a heated argument with them after that she started having aphasia and upper or lower extremity numbness and tingling. Her symptoms resolved in the ED after Stroke code called, CT head was unremarkable. Patient denied headache, chest pain, shortness of breath, nausea, vomiting, abdominal pain, headache, neck and back pain, skin rash, sick contact and recent travel  The Patient is been admitted to the hospital for further management. When she presented to the [address] [place] [address] for admission and further work up, she started having Left sided weakness anther Stroke code called, she was seen by [facility]rology, Plavix loading and full strength Aspirin recommended. Past Medical History:   Diagnosis Date    Alcohol abuse     Anxiety and depression     Asthma     DM (diabetes mellitus), type 2 ([address]     Morbid obesity ([facility] [address] 10/28/2014        Past Surgical History:   Procedure Laterality Date    HX GYN      3 C-sections    HX GYN      Miscarriage       Social History     Tobacco Use    Smoking status: Current Every Day Smoker     Packs/day: 1.00     Types: Cigarettes     Start date: 2014     Last attempt to quit: 2016     Years since quittin.4    Smokeless tobacco: Never Used   Substance Use Topics    Alcohol use:  Yes     Alcohol/week: 0.0 standard drinks     Comment: daily        Family History   Problem Relation Age of Onset    Hypertension Maternal Aunt     Hypertension Maternal Uncle     Thyroid Disease Maternal Uncle     Hypertension Maternal Grandmother     Cancer Maternal Grandfather     Hypertension Maternal Grandfather     Diabetes Paternal Grandmother     Cancer Paternal Grandmother     Hypertension Mother     Alcohol abuse Father     Substance Abuse Father     Hypertension Maternal Aunt     Hypertension Maternal Uncle     Stroke Maternal Uncle     Hypertension Maternal Uncle     Alcohol abuse Maternal Uncle     Hypertension Maternal Uncle     Hypertension Maternal Uncle     Arrhythmia Maternal Uncle         Allergies   Allergen Reactions    Metformin Other (comments)     Hair loss and unstable blood sugars    Pcn [Penicillins] Swelling        Prior to Admission medications    Medication Sig Start Date End Date Taking? Authorizing Provider   escitalopram oxalate (LEXAPRO) 10 mg tablet Take 1/2 tab by mouth daily x 1 week and then increase to 1 tab daily 6/8/22  Yes Bernadette Avery MD   clonazePAM (KlonoPIN) 0.5 mg tablet Take 1 Tablet by mouth two (2) times daily as needed for Anxiety. Max Daily Amount: 1 mg. 5/13/22  Yes Bernadette Avery MD   glipiZIDE (GLUCOTROL) 5 mg tablet Take 1 Tablet by mouth Daily (before dinner). 4/22/22  Yes Bernadette Avery MD   pantoprazole (PROTONIX) 40 mg tablet Take 1 Tablet by mouth daily. 12/7/21  Yes Bernadette Avery MD   montelukast (SINGULAIR) 10 mg tablet Take 1 Tablet by mouth daily. 12/7/21  Yes Bernadette Avery MD   amLODIPine (NORVASC) 5 mg tablet Take 1 Tablet by mouth daily. Take 1 Tab by mouth daily. 12/7/21  Yes Bernadette Avery MD   atorvastatin (LIPITOR) 20 mg tablet Take 1 Tablet by mouth daily. 12/7/21  Yes Bernadette Avery MD   losartan (COZAAR) 50 mg tablet Take 1 Tablet by mouth daily.  Increased dose 8/16/21  Yes Bernadette Avery MD   budesonide (PULMICORT) 1 mg/2 mL nbsp 2 mL by Nebulization route two (2) times daily as needed for Cough. Do not use on days when using Advair 5/12/21  Yes Domenico Hollingsworth MD   aspirin delayed-release 81 mg tablet Take 81 mg by mouth daily. Yes Provider, Historical   Blood-Glucose Sensor (Dexcom G6 Sensor) ta Check sugars 5-6 x per day. Needs filled early due to problem with sensor recently 4/22/22   Domenico Hollingsworth MD   dulaglutide (TRULICITY) 1.5 W[place]/6.1 mL sub-q pen 0.5 mL by SubCUTAneous route every seven (7) days. Use zero copay voucher pt will bring for 4 pens please. Patient not taking: Reported on 6/16/2022 3/23/22   Domenico Hollingsworth MD   insulin nph-regular human rec (HUMULIN 70-30) 100 unit/mL (70-30) inpn 70 units before breakfast and before dinner - dose updated on chart  Patient not taking: Reported on 6/16/2022 3/9/22   Domenico Hollingsworth MD   Blood-Glucose Transmitter (Dexcom G6 Transmitter) ta Check sugars 5-6 x per day 3/9/22   Domenico Hollingsworth MD   HumaLOG U-100 Insulin 100 unit/mL injection Take 25 units before lunch and sliding scale at bedtime as directed 12/7/21   Domenico Hollingsworth MD   albuterol (PROVENTIL HFA, VENTOLIN HFA, PROAIR HFA) 90 mcg/actuation inhaler Take 2 Puffs by inhalation every four (4) hours as needed for Wheezing. 12/7/21   Domenico Hollingsworth MD   fluticasone propion-salmeteroL (Advair Diskus) 500-50 mcg/dose diskus inhaler Take 1 Puff by inhalation two (2) times a day. 8/16/21   Domenico Hollingsworth MD   tiotropium bromide (SPIRIVA RESPIMAT) 2.5 mcg/actuation inhaler Take 2 Puffs by inhalation daily. 5/12/21   Domenico Hollingsworth MD   magnesium oxide (MAG-OX) 400 mg tablet Take 1 Tab by mouth daily. 5/12/21   Domenico Hollingsworth MD   zinc sulfate (ZINC-220 PO) Take  by mouth daily. Provider, Historical   cetirizine (ZYRTEC) 10 mg tablet Take 10 mg by mouth daily as needed for Allergies.     Provider, Historical   TRUEplus Insulin 1 mL 31 gauge x 5/16 syrg Use with insulin  Patient not taking: Reported on 6/16/2022 12/17/20   Miladis Hackett MD   albuterol (PROVENTIL VENTOLIN) 2.5 mg /3 mL (0.083 %) nebu  10/21/20   Provider, Historical   ascorbic acid, vitamin C, (Vitamin C) 500 mg tablet Take 500 mg by mouth daily. Provider, Historical   hydroquinone (ESOTERICA, MELQUIN) 4 % topical cream Apply  to affected area two (2) times a day. 3/3/20   Miladis Hackett MD   cranberry extract 450 mg tab tablet Take 450 mg by mouth daily.     Provider, Historical       REVIEW OF SYSTEMS:     General: negative for fever, chills, sweats, weakness  Eyes: negative for blurred vision, eye pain, loss of vision, diplopia  Ear Nose and Throat: negative for rhinorrhea, pharyngitis, otalgia, tinnitus, speech or swallowing difficulties  Respiratory:  negative for cough, sputum production, SOB, wheezing, DURON, pleuritic pain  Cardiology:  negative for chest pain, palpitations, orthopnea, PND, edema, syncope   Gastrointestinal: negative for abdominal pain, N/V, dysphagia, change in bowel habits, bleeding  Genitourinary: negative for frequency, urgency, dysuria, hematuria, incontinence  Muskuloskeletal : negative for arthralgia, myalgia  Hematology: negative for easy bruising, bleeding, lymphadenopathy  Dermatological: negative for rash, ulceration, mole change, new lesion  Endocrine: negative for hot flashes or polydipsia  Neurological: positive for weakness and aphasia on presentation, resolved now   Psychological: negative for anxiety, depression, agitation      Physical Exam:     VITALS:    Vital signs reviewed; most recent are:    Visit Vitals  BP (!) 217/110   Pulse 91   Temp 98.5 °F (36.9 °C)   Resp 28   Ht 5' 4\" (1.626 m)   Wt 151 kg (333 lb)   SpO2 96%   BMI 57.16 kg/m²     SpO2 Readings from Last 6 Encounters:   06/16/22 96%   06/02/22 97%   05/05/22 95%   04/14/22 97%   01/29/22 98%   05/12/21 95%        No intake or output data in the 24 hours ending 06/17/22 0041     Physical Exam: General:  Alert, cooperative, no distress, appears stated age. Eyes:  Conjunctivae/corneas clear. PERRL, EOMs intact. Fundi benign   Ears:  Normal TMs and external ear canals both ears. Nose: Nares normal. Septum midline. Mucosa normal. No drainage or sinus tenderness. Mouth/Throat: Lips, mucosa, and tongue normal. Teeth and gums normal.   Neck: Supple, symmetrical, trachea midline, no adenopathy, thyroid: no enlargement/tenderness/nodules, no carotid bruit and no JVD. Back:   Symmetric, no curvature. ROM normal. No CVA tenderness. Lungs:   Clear to auscultation bilaterally. Heart:  Regular rate and rhythm, S1, S2 normal, no murmur, click, rub or gallop. Abdomen:   Soft, non-tender. Bowel sounds normal. No masses,  No organomegaly. Extremities: Extremities normal, atraumatic, no cyanosis or edema. Pulses: 2+ and symmetric all extremities. Skin: Skin color, texture, turgor normal. No rashes or lesions   Lymph nodes: Cervical, supraclavicular, and axillary nodes normal.   Neurologic: CNII-XII intact. Normal strength, sensation and reflexes throughout.        Labs:  BMP:   Lab Results   Component Value Date/Time     06/16/2022 05:43 PM    K 4.1 06/16/2022 05:43 PM     06/16/2022 05:43 PM    CO2 26 06/16/2022 05:43 PM    AGAP 10 06/16/2022 05:43 PM     (H) 06/16/2022 05:43 PM    BUN 8 06/16/2022 05:43 PM    CREA 0.72 06/16/2022 05:43 PM    GFRAA >60 06/16/2022 05:43 PM    GFRNA >60 06/16/2022 05:43 PM     CBC:   Lab Results   Component Value Date/Time    WBC 8.0 06/16/2022 05:43 PM    HGB 13.4 06/16/2022 05:43 PM    HCT 40.0 06/16/2022 05:43 PM     06/16/2022 05:43 PM     All Cardiac Markers in the last 24 hours: No results found for: CPK, CK, CKMMB, CKMB, RCK3, CKMBT, CKNDX, CKND1, RIGOBERTO, TROPT, TROIQ, MILES, TROPT, TNIPOC, BNP, BNPP  Recent Glucose Results:   Lab Results   Component Value Date/Time     (H) 06/16/2022 05:43 PM     ABG: No results found for: PH, PHI, PCO2, PCO2I, PO2, PO2I, HCO3, HCO3I, FIO2, FIO2I  COAGS:   Lab Results   Component Value Date/Time    PTP 14.3 06/16/2022 05:43 PM    INR 1.1 06/16/2022 05:43 PM     Liver Panel:   Lab Results   Component Value Date/Time    ALB 4.6 06/16/2022 05:43 PM    TP 7.1 06/16/2022 05:43 PM    GLOB 2.5 06/16/2022 05:43 PM    AGRAT 1.8 06/16/2022 05:43 PM    ALT 30 06/16/2022 05:43 PM     (H) 06/16/2022 05:43 PM       Telemetry reviewed:   normal sinus rhythm  Tubes:   None   X-Ray:    CT Head   The ventricles and cortical sulci are appropriate in size and configuration. There is no evidence of intracranial hemorrhage, mass, mass effect, or acute  infarct. No extra-axial fluid collections are seen. The visualized paranasal  sinuses and mastoid air cells are clear. The orbital structures are  unremarkable. No osseous abnormalities are seen.      IMPRESSION  No acute intracranial abnormality. CTA Head and Neck   Pending     Procedures:    None     Assessment:       Active Problems:     Morbid obesity ([address] (10/28/2014)      Hypertension (7/14/2016)      DM (diabetes mellitus), type 2 (HCC) ()      Asthma ()      Anxiety and depression ()      TIA (transient ischemic attack) (6/16/2022)       Plan:    Aphasia and Left Sided weakness TIA/CVA  Admit and monitor on medical floor with Telemetry unit   CT head unremarkable   Follow up of brain MRI   Follow up CTA head and Neck   Follow up 2D Echocardiogram   PT/OT/Speech Therapy evaluation   Aspirin 325mg daily   Seen by [facility]rology Loading Plavix dose recommended  Permissive Blood pressure, will BP less than 220/120  Continue empiric Statin with Lipitor   Neurology to see in am     Accelerated Hypertension   Continue Norvasc and Cozaar   Labetalol over SBP >220mmhg  Continue to monitor BP closely     High Cholesterol   Continue Lipitor   Follow fasting lipid profile    Diabetes Mellitus   Uncontrolled   Hemoglobin A1c of 8.1  She said she stopped taking insulin   Will continue to monitor blood sugar closely   Consult Diabetic Educator     Morbid Obesity   BMI >50  Encourage weight loss     Asthma   Controlled   Will continue Pulmicort and duoneb    Risk of deterioration: medium      Total time spent with patient: 79 [address] [facility] discussed with: Patient, Family, Nursing Staff, Consultant/Specialist and >50% of time spent in counseling and coordination of care    Discussed:  Code Status, Care Plan and D/C Planning    Prophylaxis:  Lovenox    Disposition:  Home w/Family           ___________________________________________________    Attending Physician: Natali Lawson MD

## 2022-06-17 NOTE — ROUTINE PROCESS
[address]  Code stroke called on patient on admission. [id number]  Teleneuro notified. 2341  Attempted to notify patient's mother at her request.  No answer at this time. 2359  Update provided to patient mother. [id number]  Patient returned to unit from CT.

## 2022-06-17 NOTE — PROGRESS NOTES
Speech pathology note  Reviewed chart and note patient admitted with aphasia and L weakness with concern for CVA. Note CT showed No acute intracranial abnormality and MRI pending. Note patient passed the nursing dysphagia screen and a regular diet was ordered. NIHSS=0. Per chart review, symptoms resolved. Discussed case with RN who reported no SLP-related concerns. Introduced self and role of SLP to patient. Patient denied any decline in motor speech, language, or swallowing function, and patient informally observed drinking thin liquids with no difficulty. Patient Ox4, however does report mild memory issues, including inability to remember events of yesterday and difficulty recalling names of medications. Formal SLP evaluation not clinically indicated at this time, however encouraged patient to pursue OP SLP treatment upon discharge for memory strategies if memory deficits persist. Will sign off. Please re-consult if further needs arise. Thank you.     Katherine Wagoner., CCC-SLP

## 2022-06-17 NOTE — PROGRESS NOTES
OCCUPATIONAL THERAPY EVALUATION/DISCHARGE  Patient: Nimisha Del Rio (23 y.o. female)  Date: 6/17/2022  Primary Diagnosis: TIA (transient ischemic attack) [G45.9]       Precautions:     ASSESSMENT  Based on the objective data described below, the patient presents with baseline functional performance s/p admission for TIA. Patient's earlier symptoms of aphasia, L facial drop, LUE weakness and diminished sensation all resolved. Patient is received resting in bed, maintaining R eye closed 2/2 pain with pre-existing condition and requiring use of lower visual fields for improved acuity using L eye. Per patient, this is baseline and she has had no change in vision. This date, patient presents with intact strength, functional mobility, coordination, balance, sensation, and self-care capabilities. At this time, patient with no acute OT needs; will sign off. Patient clear for discharge from OT perspective. Current Level of Function (ADLs/self-care): independent    Functional Outcome Measure: The patient scored Total A-D  Total A-D (Motor Function): 66/66 on the Fugl-Mansfield Assessment which is indicative of no impairment in upper extremity functional status. Other factors to consider for discharge:      PLAN :  Recommendation for discharge: (in order for the patient to meet his/her long term goals)  No skilled occupational therapy/ follow up rehabilitation needs identified at this time. This discharge recommendation:  Has not yet been discussed the attending provider and/or case management    IF patient discharges home will need the following DME: none       SUBJECTIVE:   Patient stated Unfortunately, my vision is already trash.     OBJECTIVE DATA SUMMARY:   HISTORY:   Past Medical History:   Diagnosis Date    Alcohol abuse     Anxiety and depression     Asthma     DM (diabetes mellitus), type 2 ([facility] [address]     Morbid obesity ([facility] [address] 10/28/2014     Past Surgical History:   Procedure Laterality Date    HX GYN      3 C-sections    HX GYN      Miscarriage       Prior Level of Function/Environment/Context: independent and active; impaired vision    Expanded or extensive additional review of patient history:     Home Situation  Home Environment: Other (comment) ([facility])  One/Two Story Residence: Two story  Living Alone: No  Support Systems: Spouse/Significant Other,Parent(s),Child(kary),Friend/Neighbor  Patient Expects to be Discharged to[de-identified] Home with family assistance  Current DME Used/Available at Home: kisha Mahmood,Other (comment) (rollator)  Tub or Shower Type: Tub/Shower combination    Hand dominance: Right    EXAMINATION OF PERFORMANCE DEFICITS:  Cognitive/Behavioral Status:  Neurologic State: Alert  Orientation Level: Oriented X4  Cognition: Appropriate decision making; Appropriate for age attention/concentration; Appropriate safety awareness; Follows commands  Perception: Appears intact  Perseveration: No perseveration noted  Safety/Judgement: Awareness of environment; Fall prevention;Home safety; Insight into deficits    Skin:     Edema:     Hearing: Auditory  Auditory Impairment: None    Vision/Perceptual:    Acuity: Impaired near vision; Impaired far vision (keratinosis; blind R eye)    Corrective Lenses: Reading glasses    Range of Motion:  AROM: Within functional limits  PROM: Within functional limits    Strength:  Strength: Within functional limits    Coordination:  Coordination: Within functional limits  Fine Motor Skills-Upper: Left Intact; Right Intact    Gross Motor Skills-Upper: Left Intact; Right Intact    Tone & Sensation:  Sensation: Intact    Balance:  Sitting: Intact  Standing: Intact; Without support    Functional Mobility and Transfers for ADLs:  Bed Mobility:  Rolling: Independent  Supine to Sit: Independent  Sit to Supine: Independent  Scooting: Independent    Transfers:  Sit to Stand: Independent  Stand to Sit: Independent  Bed to Chair: Independent  Bathroom Mobility: Independent  Toilet Transfer : Independent    ADL Assessment:  Feeding: Independent    Oral Facial Hygiene/Grooming: Independent    Bathing: Independent    Upper Body Dressing: Independent    Lower Body Dressing: Independent    Toileting: Independent    ADL Intervention and task modifications:  Grooming  Grooming Assistance: Independent  Position Performed: Standing  Washing Face: Independent  Washing Hands: Independent  Brushing Teeth: Independent    Cognitive Retraining  Safety/Judgement: Awareness of environment; Fall prevention;Home safety; Insight into deficits    Functional Measure:  Fugl-Mansfield Assessment of Motor Recovery after Stroke:   Reflex Activity  Flexors/Biceps/Fingers: Can be elicited  Extensors/Triceps: Can be elicited  Reflex Subtotal: 4    Volitional Movement Within Synergies  Shoulder Retraction: Full  Shoulder Elevation: Full  Shoulder Abduction (90 degrees): Full  Shoulder External Rotation: Full  Elbow Flexion: Full  Forearm Supination: Full  Shoulder Adduction/Internal Rotation: Full  Elbow Extension: Full  Forearm Pronation: Full  Subtotal: 18    Volitional Movement Mixing Synergies  Hand to Lumbar Spine: Full  Shoulder Flexion (0-90 degrees): Full  Pronation-Supination: Full  Subtotal: 6    Volitional Movement With Little or No Synergy  Shoulder Abduction (0-90 degrees): Full  Shoulder Flexion ( degrees): Full  Pronation/Supination: Full  Subtotal : 6    Normal Reflex Activity  Biceps, Triceps, Finger Flexors:  Full  Subtotal : 2    Upper Extremity Total   Upper Extremity Total: 36    Wrist  Stability at 15 Degree Dorsiflexion: Full  Repeated Dorsiflexion/ Volar Flexion: Full  Stability at 15 Degree Dorsiflexion: Full  Repeated Dorsiflexion/ Volar Flexion: Full  Circumduction: Full  Wrist Total: 10    Hand  Mass Flexion: Full  Mass Extension: Full  Grasp A: Full  Grasp B: Full  Grasp C: Full  Grasp D: Full  Grasp E: Full  Hand Total: 14    Coordination/Speed  Tremor: None  Dysmetria: None  Time: <1s  Coordination/Speed Total : 6    Total A-D  Total A-D (Motor Function): 66/66     This is a reliable/valid measure of arm function after a neurological event. It has established value to characterize functional status and for measuring spontaneous and therapy-induced recovery; tests proximal and distal motor functions. Fugl-Mansfield Assessment - UE scores recorded between five and 30 days post neurologic event can be used to predict UE recovery at six months post neurologic event. Severe = 0-21 points   Moderately Severe = 22-33 points   Moderate = 34-47 points   Mild = 48-66 points  PRASAD Noguera, BEATRIS Stanley, & MARLON Washburn (1992). Measurement of motor recovery after stroke: Outcome assessment and sample size requirements.  Stroke, 23, pp. 9895-5852.   ------------------------------------------------------------------------------------------------------------------------------------------------------------------  MCID:  Stroke:   Kyle Kennedy et al, 2001; n = 171; mean age 79 (6) years; assessed within 16 (12) days of stroke, Acute Stroke)  FMA Motor Scores from Admission to Discharge   10 point increase in FMA Upper Extremity = 1.5 change in discharge FIM   10 point increase in FMA Lower Extremity = 1.9 change in discharge FIM  MDC:   Stroke:   Melly Herrera et al, 2008, n = 14, mean age = 59.9 (14.6) years, assessed on average 14 (6.5) months post stroke, Chronic Stroke)   FMA = 5.2 points for the Upper Extremity portion of the assessment     Occupational Therapy Evaluation Charge Determination   History Examination Decision-Making   LOW Complexity : Brief history review  LOW Complexity : 1-3 performance deficits relating to physical, cognitive , or psychosocial skils that result in activity limitations and / or participation restrictions  LOW Complexity : No comorbidities that affect functional and no verbal or physical assistance needed to complete eval tasks       Based on the above components, the patient evaluation is determined to be of the following complexity level: LOW   Pain Rating:  Pain in R eye; pre-existing condition. Activity Tolerance: WNL    After treatment patient left in no apparent distress: In bathroom; cleared for up ad cristiane per PT    COMMUNICATION/EDUCATION:   The patients plan of care was discussed with: Physical therapist and Registered nurse. Patient and/or family was verbally educated on the BE FAST acronym for signs/symptoms of CVA and TIA. All questions answered with patient indicating good understanding.      Thank you for this referral.  James Nails, ADILENE  Time Calculation: 18 mins

## 2022-06-17 NOTE — PROGRESS NOTES
PHYSICAL THERAPY EVALUATION/DISCHARGE  Patient: Poonam Hutchinson (79 y.o. female)  Date: 6/17/2022  Primary Diagnosis: TIA (transient ischemic attack) [G45.9]       Precautions: universal         ASSESSMENT  Based on the objective data described below, the patient presents with independent with ambulation without assistive device as well as up and down stairs and independent with all functional mobility. Symptoms completely resolved. Reviewed sign and symptoms of stroke with the patient and verbalized understanding. Patient at her base line. Reviewed all safety precaution and home exercise program with the patient, verbalized understanding, clear to go home per Physical Therapy perspective. Skilled physical therapy is not indicated at this time. Functional Outcome Measure: The patient scored 100 on the 100 outcome measure . Other factors to consider for discharge: none     Further skilled acute physical therapy is not indicated at this time. PLAN :  Recommendation for discharge: (in order for the patient to meet his/her long term goals)  No skilled physical therapy/ follow up rehabilitation needs identified at this time. This discharge recommendation:  Has been made in collaboration with the attending provider and/or case management    IF patient discharges home will need the following DME: patient owns DME required for discharge       SUBJECTIVE:   Patient stated I feel better now everything resolved.     OBJECTIVE DATA SUMMARY:   HISTORY:    Past Medical History:   Diagnosis Date    Alcohol abuse     Anxiety and depression     Asthma     DM (diabetes mellitus), type 2 ([address]     Morbid obesity ([facility] [address] 10/28/2014     Past Surgical History:   Procedure Laterality Date    HX GYN      3 C-sections    HX GYN      Miscarriage       Prior level of function: Independent community ambulator without assistive device.   Personal factors and/or comorbidities impacting plan of care:     [address] [place] [address] Environment: Other (comment) ([facility])  One/Two Story Residence: Two story  Living Alone: No  Support Systems: Spouse/Significant Other,Parent(s),Child(kary),Friend/Neighbor  Patient Expects to be Discharged to[de-identified] Home with family assistance  Current DME Used/Available at Home: [address] [facility], [address], straight,Other (comment) (rollator)    EXAMINATION/PRESENTATION/DECISION MAKING:   Critical Behavior:  Neurologic State: Alert  Orientation Level: Oriented X4  Cognition: Appropriate decision making,Appropriate for age attention/concentration,Appropriate safety awareness,Follows commands     Hearing: Auditory  Auditory Impairment: None    Range Of Motion:  AROM: Within functional limits           PROM: Within functional limits           Strength:    Strength: Within functional limits                    Tone & Sensation:                                  Coordination:  Coordination: Within functional limits  Vision:      Functional Mobility:  Bed Mobility:  Rolling: Independent  Supine to Sit: Independent  Sit to Supine: Independent  Scooting: Independent  Transfers:  Sit to Stand: Independent  Stand to Sit: Independent  Stand Pivot Transfers: Independent     Bed to Chair: Independent              Balance:   Sitting: Intact  Standing: Intact; Without support  Ambulation/Gait Training:  Distance (ft): 200 Feet (ft)     Ambulation - Level of Assistance: Independent     Gait Description (WDL): Within defined limits                                          Stairs:  Number of Stairs Trained: 7  Stairs - Level of Assistance: Independent   Rail Use: Both    Therapeutic Exercises:   Educate and instructed patient to continue active range of motion exercise on both legs while up on chair or on bed multiple times. Recommend patient to be up on the chair at least 3 times a day every meal times as tolerated.           Functional Measure:  Barthel Index:    Bathin  Bladder: 10  Bowels: 10  Groomin  Dressing: 10  Feeding: 10  Mobility: 15  Stairs: 10  Toilet Use: 10  Transfer (Bed to Chair and Back): 15  Total: 100/100       The Barthel ADL Index: Guidelines  1. The index should be used as a record of what a patient does, not as a record of what a patient could do. 2. The main aim is to establish degree of independence from any help, physical or verbal, however minor and for whatever reason. 3. The need for supervision renders the patient not independent. 4. A patient's performance should be established using the best available evidence. Asking the patient, friends/relatives and nurses are the usual sources, but direct observation and common sense are also important. However direct testing is not needed. 5. Usually the patient's performance over the preceding 24-48 hours is important, but occasionally longer periods will be relevant. 6. Middle categories imply that the patient supplies over 50 per cent of the effort. 7. Use of aids to be independent is allowed. Score Interpretation (from [address] [facility])    Independent   60-79 Minimally independent   40-59 Partially dependent   20-39 Very dependent   <20 Totally dependent     -Onel Fitzptarick., Barthel, D.W. (1965). Functional evaluation: the Barthel Index. [address] [facility] ([address] [facility] [address] [address] (1997). The Barthel activities of daily living index: self-reporting versus actual performance in the old (> or = 75 years). Journal of [address] [place] [address] 45(7), [facility], [facility], Marlen [facility]., Dannial Bath. (1999). Measuring the change in disability after inpatient rehabilitation; comparison of the responsiveness of the Barthel Index and Functional [place] Measure. Journal of Neurology, Neurosurgery, and Psychiatry, 66(4), 228-406. Levi Lorenzana, N.J.A, KITTY Wheeler, & CAROL RojasA. (2004) Assessment of post-stroke quality of life in cost-effectiveness studies: The usefulness of the Barthel Index and the EuroQoL-5D.  Quality of Life Research, 15, 422-38          Physical Therapy Evaluation Charge Determination   History Examination Presentation Decision-Making   LOW Complexity : Zero comorbidities / personal factors that will impact the outcome / POC LOW Complexity : 1-2 Standardized tests and measures addressing body structure, function, activity limitation and / or participation in recreation  LOW Complexity : Stable, uncomplicated  Other outcome measures barthel  LOW       Based on the above components, the patient evaluation is determined to be of the following complexity level: LOW     Pain Ratin/10    Activity Tolerance:   Good      After treatment patient left in no apparent distress:   Sitting in chair, Heels elevated for pressure relief and Call bell within reach    COMMUNICATION/EDUCATION:   The patients plan of care was discussed with: Occupational therapist, Registered nurse and Case management. Fall prevention education was provided and the patient/caregiver indicated understanding. Thank you for this referral.  Dayanna Marte PT,WCC.    Time Calculation: 27 mins

## 2022-06-17 NOTE — PROGRESS NOTES
2345  As patient arrived on [facility] unit via [facility], the transportation team made this writer aware that the patient started having numbness to the face at approximately 2324. Once the patient arrived to our floor at approximately 2340 the facial numbness progressed quickly to aphasia, left side facial drooping and left sided weakness. Rapid response and code stroke was called on this patient and she was immediately transported downstairs to CT. VS are in flowsheet for review. 2356    Patient symptoms resolved fully and she has residual HA but is able to participate with teleneuro at this time.

## 2022-06-17 NOTE — PROGRESS NOTES
Stroke Education provided to patient and the following topics were discussed    1. Patients personal risk factors for stroke are hypertension, smoking, diabetes mellitus, obesity and prior stroke    2. Warning signs of Stroke:        * Sudden numbness or weakness of the face, arm or leg, especially on one side of          The body            * Sudden confusion, trouble speaking or understanding        * Sudden trouble seeing in one or both eyes        * Sudden trouble walking, dizziness, loss of balance or coordination        * Sudden severe headache with no known cause      3. Importance of activation Emergency Medical Services ( 9-1-1 ) immediately if experience any warning signs of stroke. 4. Be sure and schedule a follow-up appointment with your primary care doctor or any specialists as instructed. 5. You must take medicine every day to treat your risk factors for stroke. Be sure to take your medicines exactly as your doctor tells you: no more, no less. Know what your medicines are for , what they do. Anti-thrombotics /anticoagulants can help prevent strokes. You are taking the following medicine(s)  plavix and aspirin     6. Smoking and second-hand smoke greatly increase your risk of stroke, cardiovascular disease and death. Smoking history cigarettes, 5 per day    7. Information provided was Stroke Handouts    8. Documentation of teaching completed in Patient Education Activity and on Care Plan with teaching response noted?   yes

## 2022-06-17 NOTE — CONSULTS
NEUROLOGY CONSULT NOTE    Patient ID:  Salas Gill  [id number]  40 y.o.  1980    Date of Consultation:  June 17, 2022    Referring Physician: Dr. Britt Hathaway    Reason for Consultation:  aphasia    History of Present Illness:     Patient Active Problem List    Diagnosis Date Noted    TIA (transient ischemic attack) 06/16/2022    Overdose, intentional self-harm, initial encounter ([place] [id number].) 06/01/2022    Suicidal ideation 06/01/2022    DM [facility]diabetes mellitus[facility] ([facility]     Asthma     Anxiety and depression     Hypertension 07/14/2016    Morbid obesity ([facility] 10/28/2014     Past Medical History:   Diagnosis Date    Alcohol abuse     Anxiety and depression     Asthma     DM (diabetes mellitus), type 2 ([facility]     Morbid obesity ([facility] 10/28/2014      Past Surgical History:   Procedure Laterality Date    HX GYN      3 C-sections    HX GYN      Miscarriage      Prior to Admission medications    Medication Sig Start Date End Date Taking? Authorizing Provider   escitalopram oxalate (LEXAPRO) 10 mg tablet Take 1/2 tab by mouth daily x 1 week and then increase to 1 tab daily 6/8/22  Yes Danny Peck MD   clonazePAM (KlonoPIN) 0.5 mg tablet Take 1 Tablet by mouth two (2) times daily as needed for Anxiety. Max Daily Amount: 1 mg. 5/13/22  Yes Danny Peck MD   glipiZIDE (GLUCOTROL) 5 mg tablet Take 1 Tablet by mouth Daily (before dinner). 4/22/22  Yes Danny Peck MD   pantoprazole (PROTONIX) 40 mg tablet Take 1 Tablet by mouth daily. 12/7/21  Yes Danny Peck MD   montelukast (SINGULAIR) 10 mg tablet Take 1 Tablet by mouth daily. 12/7/21  Yes Danny Peck MD   amLODIPine (NORVASC) 5 mg tablet Take 1 Tablet by mouth daily. Take 1 Tab by mouth daily. 12/7/21  Yes Danny Peck MD   atorvastatin (LIPITOR) 20 mg tablet Take 1 Tablet by mouth daily. 12/7/21  Yes Danny Peck MD   losartan (COZAAR) 50 mg tablet Take 1 Tablet by mouth daily.  Increased dose 8/16/21  Yes Elian Bryson MD   budesonide (PULMICORT) 1 mg/2 mL nbsp 2 mL by Nebulization route two (2) times daily as needed for Cough. Do not use on days when using Advair 5/12/21  Yes Elian Bryson MD   aspirin delayed-release 81 mg tablet Take 81 mg by mouth daily. Yes Provider, Historical   Blood-Glucose Sensor (Dexcom G6 Sensor) ta Check sugars 5-6 x per day. Needs filled early due to problem with sensor recently 4/22/22   Elian Bryson MD   dulaglutide (TRULICITY) 1.5 RN/6.5 mL sub-q pen 0.5 mL by SubCUTAneous route every seven (7) days. Use zero copay voucher pt will bring for 4 pens please. Patient not taking: Reported on 6/16/2022 3/23/22   Elian Bryson MD   insulin nph-regular human rec (HUMULIN 70-30) 100 unit/mL (70-30) inpn 70 units before breakfast and before dinner - dose updated on chart  Patient not taking: Reported on 6/16/2022 3/9/22   Elian Bryson MD   Blood-Glucose Transmitter (Dexcom G6 Transmitter) ta Check sugars 5-6 x per day 3/9/22   Elian Bryson MD   HumaLOG U-100 Insulin 100 unit/mL injection Take 25 units before lunch and sliding scale at bedtime as directed 12/7/21   Elian Bryson MD   albuterol (PROVENTIL HFA, VENTOLIN HFA, PROAIR HFA) 90 mcg/actuation inhaler Take 2 Puffs by inhalation every four (4) hours as needed for Wheezing. 12/7/21   Elian Bryson MD   fluticasone propion-salmeteroL (Advair Diskus) 500-50 mcg/dose diskus inhaler Take 1 Puff by inhalation two (2) times a day. 8/16/21   Elian Bryson MD   tiotropium bromide (SPIRIVA RESPIMAT) 2.5 mcg/actuation inhaler Take 2 Puffs by inhalation daily. 5/12/21   Elian Bryson MD   magnesium oxide (MAG-OX) 400 mg tablet Take 1 Tab by mouth daily. 5/12/21   Elian Bryson MD   zinc sulfate (ZINC-220 PO) Take  by mouth daily. Provider, Historical   cetirizine (ZYRTEC) 10 mg tablet Take 10 mg by mouth daily as needed for Allergies.     Provider, Historical   TRUEplus Insulin 1 mL 31 gauge x  syrg Use with insulin  Patient not taking: Reported on 20   Oilvia Lombardo MD   albuterol (PROVENTIL VENTOLIN) 2.5 mg /3 mL (0.083 %) nebu  10/21/20   Provider, Historical   ascorbic acid, vitamin C, (Vitamin C) 500 mg tablet Take 500 mg by mouth daily. Provider, Historical   hydroquinone (ESOTERICA, MELQUIN) 4 % topical cream Apply  to affected area two (2) times a day. 3/3/20   Olivia Lombardo MD   cranberry extract 450 mg tab tablet Take 450 mg by mouth daily. Provider, Historical     Allergies   Allergen Reactions    Metformin Other (comments)     Hair loss and unstable blood sugars    Pcn [Penicillins] Swelling      Social History     Tobacco Use    Smoking status: Current Every Day Smoker     Packs/day: 1.00     Types: Cigarettes     Start date: 2014     Last attempt to quit: 2016     Years since quittin.4    Smokeless tobacco: Never Used   Substance Use Topics    Alcohol use: Yes     Alcohol/week: 0.0 standard drinks     Comment: daily      Family History   Problem Relation Age of Onset    Hypertension Maternal Aunt     Hypertension Maternal Uncle     Thyroid Disease Maternal Uncle     Hypertension Maternal Grandmother     Cancer Maternal Grandfather     Hypertension Maternal Grandfather     Diabetes Paternal Grandmother     Cancer Paternal Grandmother     Hypertension Mother     Alcohol abuse Father     Substance Abuse Father     Hypertension Maternal Aunt     Hypertension Maternal Uncle     Stroke Maternal Uncle     Hypertension Maternal Uncle     Alcohol abuse Maternal Uncle     Hypertension Maternal Uncle     Hypertension Maternal Uncle     Arrhythmia Maternal Uncle         Subjective:      Junie Frey is a 38 y.o. female with history of hypertension, diabetes, asthma, morbid obesity was admitted from the ER for speech issues and numbness and tingling.       Per ER records patient was on the phone with Kate Hurtado having a disagreement of them cutting off her service and suddenly she had developed issues with her speech and upper and lower extremity numbness and tingling. EMS was called and patient was brought to the ER. In the ER blood pressure was 156/97. NIH stroke scale is 0. Code stroke was called. Patient was seen by teleneurology. Laboratory work-up revealed increased glucose at 140, increased alkaline phosphatase at 136. Unremarkable CBC, PT, INR and lipid panel (LDL 39.8). Head CT without contrast did not reveal any acute process. Head and neck CTA did not reveal any flow-limiting stenosis or aneurysm. No ICA stenosis. Upon arrival to the floor patient started to complain of left-sided weakness (unable to lift the arm) and left facial droop. Per and another code stroke was called. Blood pressure was 217/100. Seen by teleneurology and loaded with Plavix and placed on full-strength aspirin. Head CT without contrast did not reveal any acute process. Head and neck CTA did not reveal any flow-limiting stenosis or aneurysm. No ICA stenosis. Negative CT perfusion study. Echocardiogram done 5/12/2022 revealed EF of 50 to 55%. When seen, patient reports resolution of symptoms. No recurrence. Complains of right ocular issues. Outside reports reviewed: ER records, radiology reports, lab reports, historical medical records. Review of Systems:    Pertinent items are noted in HPI. Objective:     Patient Vitals for the past 8 hrs:   BP Temp Pulse Resp SpO2   06/17/22 0735 (!) 148/75 97.7 °F (36.5 °C) 95 16 98 %   06/17/22 0700 -- -- 93 -- --   06/17/22 0425 133/74 98.1 °F (36.7 °C) 95 17 97 %   06/17/22 0100 (!) 169/88 98.6 °F (37 °C) (!) 104 20 98 %     PHYSICAL EXAM:    NEUROLOGICAL EXAM:    Appearance: The patient is morbidly obese, provides a coherent history and is in no acute distress. Mental Status: Oriented to time, place and person. Fluent, no aphasia or dysarthria.  Mood and affect appropriate. Cranial Nerves:   Intact visual fields. JAMES, EOM's full, no nystagmus, no ptosis. Facial sensation is normal. Corneal reflexes are intact. Facial movement is symmetric. Hearing is normal bilaterally. Palate is midline with normal elevation. Sternocleidomastoid and trapezius muscles are normal. Tongue is midline. Motor:  5/5 strength in upper and lower proximal and distal muscles. Normal bulk and tone. No pronator drift. Reflexes:   Deep tendon reflexes 1+/4 and symmetrical. Downgoing toes. Sensory:   Normal to cold. Gait:  Not tested. Tremor:   No tremor noted. Cerebellar:  Intact FTN. Imaging  CT Head x 2, head/neck CTA, brain MRI: reviewed    Lab Review    Recent Results (from the past 24 hour(s))   SAMPLES BEING HELD    Collection Time: 06/16/22  5:43 PM   Result Value Ref Range    SAMPLES BEING HELD 1LAV 1BLUE 1SST 1PST 1GREEN     COMMENT        Add-on orders for these samples will be processed based on acceptable specimen integrity and analyte stability, which may vary by analyte. CBC WITH AUTOMATED DIFF    Collection Time: 06/16/22  5:43 PM   Result Value Ref Range    WBC 8.0 3.6 - 11.0 K/uL    RBC 4.57 3.80 - 5.20 M/uL    HGB 13.4 11.5 - 16.0 g/dL    HCT 40.0 35.0 - 47.0 %    MCV 87.5 80.0 - 99.0 FL    MCH 29.3 26.0 - 34.0 PG    MCHC 33.5 30.0 - 36.5 g/dL    RDW 14.0 11.5 - 14.5 %    PLATELET 003 346 - 903 K/uL    MPV 10.2 8.9 - 12.9 FL    NRBC 0.0 0  WBC    ABSOLUTE NRBC 0.00 0.00 - 0.01 K/uL    NEUTROPHILS 52 32 - 75 %    LYMPHOCYTES 37 12 - 49 %    MONOCYTES 8 5 - 13 %    EOSINOPHILS 3 0 - 7 %    BASOPHILS 1 0 - 1 %    IMMATURE GRANULOCYTES 0 0.0 - 0.5 %    ABS. NEUTROPHILS 4.2 1.8 - 8.0 K/UL    ABS. LYMPHOCYTES 3.0 0.8 - 3.5 K/UL    ABS. MONOCYTES 0.6 0.0 - 1.0 K/UL    ABS. EOSINOPHILS 0.2 0.0 - 0.4 K/UL    ABS. BASOPHILS 0.1 0.0 - 0.1 K/UL    ABS. IMM.  GRANS. 0.0 0.00 - 0.04 K/UL    DF AUTOMATED     METABOLIC PANEL, COMPREHENSIVE    Collection Time: 06/16/22 5:43 PM   Result Value Ref Range    Sodium 137 136 - 145 mmol/L    Potassium 4.1 3.5 - 5.1 mmol/L    Chloride 101 98 - 107 mmol/L    CO2 26 22 - 29 mmol/L    Anion gap 10 5 - 15 mmol/L    Glucose 140 (H) 65 - 100 mg/dL    BUN 8 6 - 20 MG/DL    Creatinine 0.72 0.50 - 0.90 MG/DL    BUN/Creatinine ratio 11 (L) 12 - 20      GFR est AA >60 >60 ml/min/1.73m2    GFR est non-AA >60 >60 ml/min/1.73m2    Calcium 9.9 8.6 - 10.0 MG/DL    Bilirubin, total 0.5 0.2 - 1.0 MG/DL    ALT (SGPT) 30 10 - 35 U/L    AST (SGOT) 25 10 - 35 U/L    Alk. phosphatase 136 (H) 35 - 104 U/L    Protein, total 7.1 6.4 - 8.3 g/dL    Albumin 4.6 3.5 - 5.2 g/dL    Globulin 2.5 2.0 - 4.0 g/dL    A-G Ratio 1.8 1.1 - 2.2     PROTHROMBIN TIME + INR    Collection Time: 06/16/22  5:43 PM   Result Value Ref Range    INR 1.1 0.9 - 1.1      Prothrombin time 14.3 11.9 - 14.6 sec   GLUCOSE, POC    Collection Time: 06/16/22  9:29 PM   Result Value Ref Range    Glucose (POC) 141 (H) 65 - 117 mg/dL    Performed by [address] [place] [address], POC    Collection Time: 06/16/22 11:46 PM   Result Value Ref Range    Glucose (POC) 126 (H) 65 - 117 mg/dL    Performed by [address] [place] [address] PANEL    Collection Time: 06/17/22  4:04 AM   Result Value Ref Range    Cholesterol, total 111 <200 MG/DL    Triglyceride 146 <150 MG/DL    HDL Cholesterol 42 MG/DL    LDL, calculated 39.8 0 - 100 MG/DL    VLDL, calculated 29.2 MG/DL    CHOL/HDL Ratio 2.6 0.0 - 5.0           Assessment:   TIA  Hypertension    Plan:   Neurological examination does not reveal any focal deficits. Worse case scenario patient may have a TIA. Other scenario is stress related transient neurological symptoms. Head CT without contrast done twice did not reveal any acute process. Brain MRI is pending. Head and neck CTA with contrast did not reveal any flow-limiting stenosis or aneurysm. No ICA stenosis. Previous echocardiogram was unremarkable.     LDL is at goal.  No indication to start patient on statin therapy. Aggressive management of blood pressure. Continue aspirin daily for stroke prophylaxis. Aspirin testing was ordered to assess for efficacy. Baseline evaluation by therapy. Further intervention be done pending results of testing. Thank you for the consult.

## 2022-06-17 NOTE — PROGRESS NOTES
Problem: Falls - Risk of  Goal: *Absence of Falls  Description: Document Kirsten Nicholson Fall Risk and appropriate interventions in the flowsheet.   Outcome: Progressing Towards Goal  Note: Fall Risk Interventions:   Fall risk band  Bed alarm  Call bell within reach  Hourly rounding  Education on fall precautions  Problem: Patient Education: Go to Patient Education Activity  Goal: Patient/Family Education  Outcome: Progressing Towards Goal     Problem: TIA/CVA Stroke: 0-24 hours  Goal: Off Pathway (Use only if patient is Off Pathway)  Outcome: Progressing Towards Goal  Goal: Activity/Safety  Outcome: Progressing Towards Goal  Goal: Consults, if ordered  Outcome: Progressing Towards Goal  Goal: Diagnostic Test/Procedures  Outcome: Progressing Towards Goal  Goal: Nutrition/Diet  Outcome: Resolved/Met  Goal: Discharge Planning  Outcome: Progressing Towards Goal  Goal: Medications  Outcome: Progressing Towards Goal  Goal: Respiratory  Outcome: Progressing Towards Goal  Goal: Treatments/Interventions/Procedures  Outcome: Progressing Towards Goal  Goal: Minimize risk of bleeding post-thrombolytic infusion  Outcome: Progressing Towards Goal  Goal: Monitor for complications post-thrombolytic infusion  Outcome: Progressing Towards Goal  Goal: Psychosocial  Outcome: Progressing Towards Goal  Goal: *Hemodynamically stable  Outcome: Progressing Towards Goal  Goal: *Neurologically stable  Description: Absence of additional neurological deficits    Outcome: Progressing Towards Goal  Goal: *Verbalizes anxiety and depression are reduced or absent  Outcome: Progressing Towards Goal  Goal: *Absence of Signs of Aspiration on Current Diet  Outcome: Progressing Towards Goal  Goal: *Absence of deep venous thrombosis signs and symptoms(Stroke Metric)  Outcome: Progressing Towards Goal  Goal: *Ability to perform ADLs and demonstrates progressive mobility and function  Outcome: Progressing Towards Goal  Goal: *Stroke education started(Stroke Metric)  Outcome: Progressing Towards Goal  Goal: *Dysphagia screen performed(Stroke Metric)  Outcome: Progressing Towards Goal  Goal: *Rehab consulted(Stroke Metric)  Outcome: Progressing Towards Goal            Medication Interventions: Teach patient to arise slowly

## 2022-06-17 NOTE — PROGRESS NOTES
0700: Bedside shift change report given to Tiny Madden RN (oncoming nurse) by Angel Irizarry RN (offgoing nurse). Report included the following information SBAR, Kardex, ED Summary, Procedure Summary, Intake/Output, MAR, Recent Results and Med Rec Status.     1304: Verbal orders received to give patient Tylenol 650 mg PO PRN for pain

## 2022-06-17 NOTE — PROGRESS NOTES
Reason for Admission:  TIA                     RUR Score:      10%               Plan for utilizing home health:   None; pt had [facility] in the past       PCP: First and Last name:  Rajani Jesus MD     Name of Practice:    Are you a current patient: Yes/No: yes    Approximate date of last visit: 5/5/22   Can you participate in a virtual visit with your PCP:  yes                    Current Advanced Directive/Advance Care Plan: Full Code  None; her  is her next of kin    Healthcare Decision Maker:   Click here to [address] including selection of the Healthcare Decision Maker Relationship (ie \"Primary\")                             Transition of Care Plan:                    Met with pt for d/c planning. Pt lives with her  and 3 children in a 2 story townhouse with no entry steps and 15 interior stairs. She is independent with ADL's and uses a cane or a rollator with ambulation. 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 Medications are from [facility] on [address] 1. PT/OT/SLP evals completed--no needs  2. Neurology consult  3. famiy to transport home at d/c  4. CM following  5. Pt to f/u OP    Care Management Interventions  Mode of Transport at Discharge: Other (see comment)  Transition of Care Consult (CM Consult): Discharge Planning  Physical Therapy Consult: Yes  Occupational Therapy Consult: Yes  Speech Therapy Consult: Yes  Support Systems: Spouse/Significant Other,Parent(s),Child(kary),Friend/Neighbor  Confirm Follow Up Transport: Family  Discharge Location  Patient Expects to be Discharged to[de-identified] Home with family assistance  FLORESITA Aquino

## 2022-06-18 ENCOUNTER — APPOINTMENT (OUTPATIENT)
Dept: MRI IMAGING | Age: 42
DRG: 069 | End: 2022-06-18
Attending: HOSPITALIST
Payer: COMMERCIAL

## 2022-06-18 VITALS
WEIGHT: 293 LBS | OXYGEN SATURATION: 98 % | HEART RATE: 72 BPM | TEMPERATURE: 97.9 F | HEIGHT: 64 IN | RESPIRATION RATE: 18 BRPM | DIASTOLIC BLOOD PRESSURE: 67 MMHG | SYSTOLIC BLOOD PRESSURE: 115 MMHG | BODY MASS INDEX: 50.02 KG/M2

## 2022-06-18 LAB
GLUCOSE BLD STRIP.AUTO-MCNC: 122 MG/DL (ref 65–117)
GLUCOSE BLD STRIP.AUTO-MCNC: 144 MG/DL (ref 65–117)
GLUCOSE BLD STRIP.AUTO-MCNC: 145 MG/DL (ref 65–117)
SERVICE CMNT-IMP: ABNORMAL

## 2022-06-18 PROCEDURE — 82962 GLUCOSE BLOOD TEST: CPT

## 2022-06-18 PROCEDURE — 74011250637 HC RX REV CODE- 250/637: Performed by: HOSPITALIST

## 2022-06-18 PROCEDURE — 99233 SBSQ HOSP IP/OBS HIGH 50: CPT | Performed by: PSYCHIATRY & NEUROLOGY

## 2022-06-18 PROCEDURE — 74011250637 HC RX REV CODE- 250/637: Performed by: FAMILY MEDICINE

## 2022-06-18 PROCEDURE — 94640 AIRWAY INHALATION TREATMENT: CPT

## 2022-06-18 PROCEDURE — 74011000250 HC RX REV CODE- 250: Performed by: FAMILY MEDICINE

## 2022-06-18 PROCEDURE — A9576 INJ PROHANCE MULTIPACK: HCPCS | Performed by: RADIOLOGY

## 2022-06-18 PROCEDURE — 74011250637 HC RX REV CODE- 250/637: Performed by: STUDENT IN AN ORGANIZED HEALTH CARE EDUCATION/TRAINING PROGRAM

## 2022-06-18 PROCEDURE — 74011250636 HC RX REV CODE- 250/636: Performed by: HOSPITALIST

## 2022-06-18 PROCEDURE — 70553 MRI BRAIN STEM W/O & W/DYE: CPT

## 2022-06-18 PROCEDURE — 74011250636 HC RX REV CODE- 250/636: Performed by: RADIOLOGY

## 2022-06-18 RX ORDER — LOSARTAN POTASSIUM 100 MG/1
100 TABLET ORAL DAILY
Qty: 30 TABLET | Refills: 2 | Status: SHIPPED | OUTPATIENT
Start: 2022-06-19 | End: 2022-07-11 | Stop reason: SDUPTHER

## 2022-06-18 RX ORDER — LABETALOL 100 MG/1
100 TABLET, FILM COATED ORAL 2 TIMES DAILY
Qty: 30 TABLET | Refills: 2 | Status: SHIPPED | OUTPATIENT
Start: 2022-06-18 | End: 2022-08-05 | Stop reason: ALTCHOICE

## 2022-06-18 RX ORDER — AMLODIPINE BESYLATE 10 MG/1
10 TABLET ORAL DAILY
Qty: 60 TABLET | Refills: 3 | Status: SHIPPED | OUTPATIENT
Start: 2022-06-19 | End: 2022-07-11 | Stop reason: SDUPTHER

## 2022-06-18 RX ORDER — LORAZEPAM 1 MG/1
1 TABLET ORAL ONCE
Status: COMPLETED | OUTPATIENT
Start: 2022-06-18 | End: 2022-06-18

## 2022-06-18 RX ADMIN — SODIUM CHLORIDE, PRESERVATIVE FREE 10 ML: 5 INJECTION INTRAVENOUS at 16:55

## 2022-06-18 RX ADMIN — GADOTERIDOL 20 ML: 279.3 INJECTION, SOLUTION INTRAVENOUS at 14:06

## 2022-06-18 RX ADMIN — ACETAMINOPHEN 650 MG: 325 TABLET ORAL at 04:32

## 2022-06-18 RX ADMIN — AMLODIPINE BESYLATE 10 MG: 5 TABLET ORAL at 09:39

## 2022-06-18 RX ADMIN — ENOXAPARIN SODIUM 40 MG: 100 INJECTION SUBCUTANEOUS at 16:55

## 2022-06-18 RX ADMIN — GLIPIZIDE 5 MG: 5 TABLET ORAL at 16:55

## 2022-06-18 RX ADMIN — LABETALOL HYDROCHLORIDE 100 MG: 100 TABLET, FILM COATED ORAL at 17:00

## 2022-06-18 RX ADMIN — LOSARTAN POTASSIUM 100 MG: 50 TABLET, FILM COATED ORAL at 09:39

## 2022-06-18 RX ADMIN — LABETALOL HYDROCHLORIDE 100 MG: 100 TABLET, FILM COATED ORAL at 09:39

## 2022-06-18 RX ADMIN — IPRATROPIUM BROMIDE AND ALBUTEROL SULFATE 3 ML: .5; 3 SOLUTION RESPIRATORY (INHALATION) at 04:09

## 2022-06-18 RX ADMIN — ASPIRIN 325 MG: 325 TABLET ORAL at 09:39

## 2022-06-18 RX ADMIN — PANTOPRAZOLE SODIUM 40 MG: 40 TABLET, DELAYED RELEASE ORAL at 06:55

## 2022-06-18 RX ADMIN — CETIRIZINE HYDROCHLORIDE 10 MG: 10 TABLET, FILM COATED ORAL at 05:09

## 2022-06-18 RX ADMIN — CLONAZEPAM 0.5 MG: 0.5 TABLET ORAL at 09:46

## 2022-06-18 RX ADMIN — LORAZEPAM 1 MG: 1 TABLET ORAL at 05:09

## 2022-06-18 RX ADMIN — ESCITALOPRAM OXALATE 5 MG: 10 TABLET ORAL at 09:39

## 2022-06-18 RX ADMIN — MONTELUKAST 10 MG: 10 TABLET, FILM COATED ORAL at 09:39

## 2022-06-18 RX ADMIN — ENOXAPARIN SODIUM 40 MG: 100 INJECTION SUBCUTANEOUS at 03:26

## 2022-06-18 RX ADMIN — ATORVASTATIN CALCIUM 20 MG: 20 TABLET, FILM COATED ORAL at 09:39

## 2022-06-18 RX ADMIN — SODIUM CHLORIDE, PRESERVATIVE FREE 10 ML: 5 INJECTION INTRAVENOUS at 06:56

## 2022-06-18 NOTE — PROGRESS NOTES
Neurology - Inpatient Progress Note     Name:   Kyung Meek  MRN:    [id number]  [address] [facility] Date:   6/16/2022    Follow up:   Stress-related symptoms vs TIA    Interval History     Aspirin test: 391, effective range (non-medicated range is equal to or greater than 550)    Brain MRI: Pending    CTA Head/ Neck and CT Brain perfusion (6-): Normal CT perfusion. No large vessel occlusion or hemodynamically significant carotid stenosis. No perfusion abnormality. Pt sitting in bedside chair, awake, alert, eating, NAD. Fully conversant. Reports she has been moving around room w/o difficulty and Hospitalist was in room recently and d/w patient that she will be d/c today.          Brief ROS: As noted above         Allergies   Allergen Reactions    Metformin Other (comments)     Hair loss and unstable blood sugars    Pcn [Penicillins] Swelling       Current Facility-Administered Medications:     sodium chloride (NS) flush 5-40 mL, 5-40 mL, IntraVENous, Q8H, Stanley Santos MD, 10 mL at 06/18/22 0656    sodium chloride (NS) flush 5-40 mL, 5-40 mL, IntraVENous, PRN, Berta Santos MD    polyethylene glycol (MIRALAX) packet 17 g, 17 g, Oral, DAILY PRN, Berta Santos MD    ondansetron (ZOFRAN ODT) tablet 4 mg, 4 mg, Oral, Q8H PRN **OR** ondansetron (ZOFRAN) injection 4 mg, 4 mg, IntraVENous, Q6H PRN, Berta Santos MD    amLODIPine (NORVASC) tablet 10 mg, 10 mg, Oral, DAILY, Mariluz Ochoa MD, 10 mg at 06/18/22 0939    labetaloL (NORMODYNE) tablet 100 mg, 100 mg, Oral, BID, Mariluz Ochoa MD, 100 mg at 06/18/22 0939    enoxaparin (LOVENOX) injection 40 mg, 40 mg, SubCUTAneous, Q12H, Mariluz Ochoa MD, 40 mg at 06/18/22 0326    budesonide (PULMICORT) 500 mcg/2 ml nebulizer suspension, 1,000 mcg, Nebulization, BID PRN, Juan George MD    ipratropium (ATROVENT) 0.02 % nebulizer solution 0.5 mg, 0.5 mg, Nebulization, BID PRN, Juan Del Cid MD    losartan (COZAAR) tablet 100 mg, 100 mg, Oral, DAILY, Jersey Miranda MD, 100 mg at 06/18/22 0939    melatonin tablet 6 mg, 6 mg, Oral, QHS PRN, Twila Long MD, 6 mg at 06/17/22 2352    albuterol-ipratropium (DUO-NEB) 2.5 MG-0.5 MG/3 ML, 3 mL, Nebulization, Q4H PRN, Rosanne Soto MD, 3 mL at 06/18/22 0409    atorvastatin (LIPITOR) tablet 20 mg, 20 mg, Oral, DAILY, Tom, Dinorah Abdalla MD, 20 mg at 06/18/22 5396    clonazePAM (KlonoPIN) tablet 0.5 mg, 0.5 mg, Oral, BID PRN, Rosanne Soto MD, 0.5 mg at 06/18/22 0946    cetirizine (ZYRTEC) tablet 10 mg, 10 mg, Oral, DAILY PRN, Rosanne Soto MD, 10 mg at 06/18/22 0509    escitalopram oxalate (LEXAPRO) tablet 5 mg, 5 mg, Oral, DAILY, Rosanne Soto MD, 5 mg at 06/18/22 0939    glipiZIDE (GLUCOTROL) tablet 5 mg, 5 mg, Oral, ACD, TomDinorah watts MD    montelukast (SINGULAIR) tablet 10 mg, 10 mg, Oral, DAILY, Rosanne Soto MD, 10 mg at 06/18/22 0939    pantoprazole (PROTONIX) tablet 40 mg, 40 mg, Oral, ACB, Rosanne Soto MD, 40 mg at 06/18/22 0655    acetaminophen (TYLENOL) tablet 650 mg, 650 mg, Oral, Q4H PRN, 650 mg at 06/18/22 0432 **OR** acetaminophen (TYLENOL) solution 650 mg, 650 mg, Per NG tube, Q4H PRN **OR** acetaminophen (TYLENOL) suppository 650 mg, 650 mg, Rectal, Q4H PRN, Camden Ochoa MD    aspirin tablet 325 mg, 325 mg, Oral, DAILY, TomDinorah watts MD, 325 mg at 06/18/22 6430    hydrALAZINE (APRESOLINE) 20 mg/mL injection 20 mg, 20 mg, IntraVENous, Q6H PRN, Rosanne Soto MD, 20 mg at 06/16/22 7971    glucose chewable tablet 16 g, 4 Tablet, Oral, PRN, Rosanne Soto MD    dextrose 10% infusion 0-250 mL, 0-250 mL, IntraVENous, PRN, Dinorah Freedman MD    glucagon (GLUCAGEN) injection 1 mg, 1 mg, IntraMUSCular, PRN, Rosanne Soto MD      PMHx: has a past medical history of Alcohol abuse, Anxiety and depression, Asthma, DM (diabetes mellitus), type 2 ([facility] and Morbid obesity ([facility] (10/28/2014).     PSHx: has a past surgical history that includes hx gyn and hx gyn. Impression / Plan       ICD-10-CM ICD-9-CM   1. Aphasia  R47.01 784.3   2. TIA (transient ischemic attack)  G45.9 435.9   3. Primary hypertension  I10 401.9     Pt describes severe life stressors over past few weeks due to Husbands health. Admitted on 6-16 for speech difficulty followed by whole body numbness/ tingling; speech difficulty resolved then returned later with facial droop per patient. Was on ASA 81 mg/ day and Lipitor 20 mg QHS prior to admission. ASA increased to 325 mg. Lipitor kept same (LDL is 51). No abnormalities on CTA Head/ Neck or CT Brain perfusion. Due to pt's multiple stroke risk factors (DM, HTN, HLD, prior TIA per patient), my recommendation is to check the Brain MRI prior to discharge  to know whether pt had stroke or not. Sent secure message to Dr Ochoa/ Hospitalist regarding that.       Pt appears neurologically stable, exam is non-focal  Agree w/ Dc home today  Defer Attending Physician on whether to pursue Brain MRI or not    Will s/o        Signed By: May Dykes MD     June 18, 2022

## 2022-06-18 NOTE — DISCHARGE SUMMARY
Discharge Summary       PATIENT ID: Elina Gallardo  MRN: [id number]   YOB: 1980    DATE OF ADMISSION: 6/16/2022  5:31 PM    DATE OF DISCHARGE: 6/18/2022  PRIMARY CARE PROVIDER: Derrick Merino MD       DISCHARGING PHYSICIAN: Bradford Plummer MD    To contact this individual call [phone] and ask the  to page. If unavailable ask to be transferred the Adult Hospitalist Department. CONSULTATIONS: IP CONSULT TO NEUROLOGY    PROCEDURES/SURGERIES: * No surgery found *    ADMITTING DIAGNOSES & HOSPITAL COURSE:     Ms. Heydi De Jesus is a 38 y.o.  female who presents with past medical history of uncontrolled hypertension, diabetes Mellitus, asthma and Morbid obesity who presented to the emergency department at [facility] because of aphasia Patient was at home on the phone with Vanessa Gama as they were getting ready to cut her power off she went into a heated argument with them after that she started having aphasia and upper or lower extremity numbness and tingling. Her symptoms resolved in the ED after Stroke code called, CT head was unremarkable. Patient denied headache, chest pain, shortness of breath, nausea, vomiting, abdominal pain, headache, neck and back pain, skin rash, sick contact and recent travel  The Patient is been admitted to the hospital for further management. When she presented to the [address] [place] [address] for admission and further work up, she started having Left sided weakness phoebe Stroke code called, she was seen by [facility] Neurology, Plavix loading and full strength Aspirin recommended      DISCHARGE DIAGNOSES / PLAN:      Aphasia and Left Sided weakness: Likely secondary to TIA  There is no focal deficits on admission  Head CT scan negative  Head & neck CTA with contrast did not reveal any flow limiting stenosis or aneurysm. No ICA  MRI brain no stroke  Echo performed 5/12: EF 50-55%. Mild concentric hypertrophy  Continue aspirin 81 mg po daily. Stop aspirin 325 mg  Appreciate neurology eval     Accelerated Hypertension   BP better controlled  Continue Norvasc and Cozaar   Started labetalol 100 mg BID  F/u with PCP for BP check and optimization of anti-hypertensive meds     Diabetes Mellitus with hyperglycemia  Hemoglobin A1c of 8.1  Resume home anti-diabetic medication     Morbid Obesity   BMI >50  Encourage weight loss      Asthma   Controlled   Will continue Pulmicort and duoneb     Risk of deterioration: medium                   PENDING TEST RESULTS:   At the time of discharge the following test results are still pending:     FOLLOW UP APPOINTMENTS:    Follow-up Information     Follow up With Specialties Details Why Contact Info    Rosemarie Peters MD [facility]   [facility] [phone]             ADDITIONAL CARE RECOMMENDATIONS:     DIET: Diabetic Diet    ACTIVITY: Activity as tolerated    WOUND CARE:     EQUIPMENT needed:       DISCHARGE MEDICATIONS:  Current Discharge Medication List      START taking these medications    Details   labetaloL (NORMODYNE) 100 mg tablet Take 1 Tablet by mouth two (2) times a day. Indications: high blood pressure  Qty: 30 Tablet, Refills: 2  Start date: 6/18/2022         CONTINUE these medications which have CHANGED    Details   losartan (COZAAR) 100 mg tablet Take 1 Tablet by mouth daily. Indications: high blood pressure  Qty: 30 Tablet, Refills: 2  Start date: 6/19/2022      amLODIPine (NORVASC) 10 mg tablet Take 1 Tablet by mouth daily. Indications: high blood pressure  Qty: 60 Tablet, Refills: 3  Start date: 6/19/2022         CONTINUE these medications which have NOT CHANGED    Details   escitalopram oxalate (LEXAPRO) 10 mg tablet Take 1/2 tab by mouth daily x 1 week and then increase to 1 tab daily  Qty: 30 Tablet, Refills: 0    Associated Diagnoses: Adjustment disorder with depressed mood;  Anxiety as acute reaction to exceptional stress      clonazePAM (KlonoPIN) 0.5 mg tablet Take 1 Tablet by mouth two (2) times daily as needed for Anxiety. Max Daily Amount: 1 mg. Qty: 60 Tablet, Refills: 1    Associated Diagnoses: Adjustment disorder with depressed mood; Anxiety as acute reaction to exceptional stress      glipiZIDE (GLUCOTROL) 5 mg tablet Take 1 Tablet by mouth Daily (before dinner). Qty: 90 Tablet, Refills: 1    Associated Diagnoses: Diabetes mellitus type 2 in obese (HCC)      pantoprazole (PROTONIX) 40 mg tablet Take 1 Tablet by mouth daily. Qty: 90 Tablet, Refills: 2    Associated Diagnoses: Gastroesophageal reflux disease, unspecified whether esophagitis present      montelukast (SINGULAIR) 10 mg tablet Take 1 Tablet by mouth daily. Qty: 90 Tablet, Refills: 2    Associated Diagnoses: Severe persistent asthma without complication      atorvastatin (LIPITOR) 20 mg tablet Take 1 Tablet by mouth daily. Qty: 90 Tablet, Refills: 1    Associated Diagnoses: Diabetes mellitus type 2 in obese ([facility])      budesonide (PULMICORT) 1 mg/2 mL nbsp 2 mL by Nebulization route two (2) times daily as needed for Cough. Do not use on days when using Advair  Qty: 270 Each, Refills: 1    Associated Diagnoses: Severe persistent asthma with exacerbation      aspirin delayed-release 81 mg tablet Take 81 mg by mouth daily. Blood-Glucose Sensor (Dexcom G6 Sensor) ta Check sugars 5-6 x per day. Needs filled early due to problem with sensor recently  Qty: 9 Each, Refills: 2    Associated Diagnoses: Uncontrolled type 2 diabetes mellitus with hyperglycemia ([facility])      dulaglutide (TRULICITY) 1.5 KF/4.7 mL sub-q pen 0.5 mL by SubCUTAneous route every seven (7) days. Use zero copay voucher pt will bring for 4 pens please.   Qty: 4 Each, Refills: 0    Associated Diagnoses: Diabetes mellitus type 2 in obese ([facility])      Blood-Glucose Transmitter (Dexcom G6 Transmitter) ta Check sugars 5-6 x per day  Qty: 1 Each, Refills: 0    Associated Diagnoses: Uncontrolled type 2 diabetes mellitus with hyperglycemia (HCC)      HumaLOG U-100 Insulin 100 unit/mL injection Take 25 units before lunch and sliding scale at bedtime as directed  Qty: 10 mL, Refills: 2    Associated Diagnoses: Diabetes mellitus type 2 in obese (HCC)      albuterol (PROVENTIL HFA, VENTOLIN HFA, PROAIR HFA) 90 mcg/actuation inhaler Take 2 Puffs by inhalation every four (4) hours as needed for Wheezing. Qty: 3 Each, Refills: 1    Associated Diagnoses: Severe persistent asthma without complication      fluticasone propion-salmeteroL (Advair Diskus) 500-50 mcg/dose diskus inhaler Take 1 Puff by inhalation two (2) times a day. Qty: 3 Inhaler, Refills: 1    Associated Diagnoses: Severe persistent asthma without complication      tiotropium bromide (SPIRIVA RESPIMAT) 2.5 mcg/actuation inhaler Take 2 Puffs by inhalation daily. Qty: 3 Inhaler, Refills: 1      magnesium oxide (MAG-OX) 400 mg tablet Take 1 Tab by mouth daily. Qty: 90 Tab, Refills: 1    Associated Diagnoses: Leg cramps      zinc sulfate (ZINC-220 PO) Take  by mouth daily. cetirizine (ZYRTEC) 10 mg tablet Take 10 mg by mouth daily as needed for Allergies. TRUEplus Insulin 1 mL 31 gauge x 5/16 syrg Use with insulin  Qty: 200 Syringe, Refills: 2      albuterol (PROVENTIL VENTOLIN) 2.5 mg /3 mL (0.083 %) nebu       ascorbic acid, vitamin C, (Vitamin C) 500 mg tablet Take 500 mg by mouth daily. hydroquinone (ESOTERICA, MELQUIN) 4 % topical cream Apply  to affected area two (2) times a day. Qty: 30 g, Refills: 0    Associated Diagnoses: Melasma      cranberry extract 450 mg tab tablet Take 450 mg by mouth daily. STOP taking these medications       insulin nph-regular human rec (HUMULIN 70-30) 100 unit/mL (70-30) inpn Comments:   Reason for Stopping:             NOTIFY YOUR PHYSICIAN FOR ANY OF THE FOLLOWING:   Fever over 101 degrees for 24 hours.    Chest pain, shortness of breath, fever, chills, nausea, vomiting, diarrhea, change in mentation, falling, weakness, bleeding. Severe pain or pain not relieved by medications. Or, any other signs or symptoms that you may have questions about. DISPOSITION:  x  Home With:   OT  PT  HH  RN       Long term SNF/Inpatient Rehab    Independent/assisted living    Hospice    Other:       PATIENT CONDITION AT DISCHARGE:     Functional status    Poor     Deconditioned    x Independent      Cognition   x  Lucid     Forgetful     Dementia      Catheters/lines (plus indication)    Wagner     PICC     PEG    x None      Code status   x  Full code     DNR      PHYSICAL EXAMINATION AT DISCHARGE:   Refer to Progress Note  General:  Alert, cooperative, no distress, appears stated age. Eyes:  Conjunctivae/corneas clear. PERRL, EOMs intact. Fundi benign   Ears:  Normal TMs and external ear canals both ears. Nose: Nares normal. Septum midline. Mucosa normal. No drainage or sinus tenderness. Mouth/Throat: Lips, mucosa, and tongue normal. Teeth and gums normal.   Neck: Supple, symmetrical, trachea midline, no adenopathy, thyroid: no enlargement/tenderness/nodules, no carotid bruit and no JVD. Back:   Symmetric, no curvature. ROM normal. No CVA tenderness. Lungs:   Clear to auscultation bilaterally. Heart:  Regular rate and rhythm, S1, S2 normal, no murmur, click, rub or gallop. Abdomen:   Soft, non-tender. Bowel sounds normal. No masses,  No organomegaly. Extremities: Extremities normal, atraumatic, no cyanosis or edema. Pulses: 2+ and symmetric all extremities. Skin: Skin color, texture, turgor normal. No rashes or lesions   Lymph nodes: Cervical, supraclavicular, and axillary nodes normal.   Neurologic: CNII-XII intact. Normal strength, sensation and reflexes throughout.              CHRONIC MEDICAL DIAGNOSES:  Problem List as of 6/18/2022 Date Reviewed: 6/8/2022          Codes Class Noted - Resolved    TIA (transient ischemic attack) ICD-10-CM: G45.9  ICD-9-CM: 435.9  6/16/2022 - Present        DM (diabetes mellitus), type 2 ([facility] ICD-10-CM: E11.9  ICD-9-CM: 250.00  Unknown - Present        Asthma ICD-10-CM: J45.909  ICD-9-CM: 493.90  Unknown - Present        Anxiety and depression ICD-10-CM: F41.9, F32. A  ICD-9-CM: 300.00, 311  Unknown - Present        Overdose, intentional self-harm, initial encounter [facility]) ICD-10-CM: T50.902A  ICD-9-CM: 977.9, E950.5  6/1/2022 - Present        Suicidal ideation ICD-10-CM: R45.851  ICD-9-CM: V62.84  6/1/2022 - Present        Hypertension ICD-10-CM: I10  ICD-9-CM: 401.9  7/14/2016 - Present        Morbid obesity ([facility] ICD-10-CM: E66.01  ICD-9-CM: 278.01  10/28/2014 - Present        RESOLVED: Transient ischemic attack involving right internal carotid artery ICD-10-CM: G45.1  ICD-9-CM: 435.8  7/18/2016 - 6/1/2022        RESOLVED: Cerebral infarction involving right middle cerebral artery ([facility] ICD-10-CM: I63.511  ICD-9-CM: 434.01  7/16/2016 - 7/18/2016        RESOLVED: Hemiparesis affecting left side as late effect of cerebrovascular accident [facility]) ICD-10-CM: S14.769  ICD-9-CM: 438.20  7/16/2016 - 7/18/2016        RESOLVED: Blurred vision, left eye ICD-10-CM: H53.8  ICD-9-CM: 368.8  7/15/2016 - 6/1/2022        RESOLVED: Weakness of left side of body ICD-10-CM: R53.1  ICD-9-CM: 728.87  7/15/2016 - 6/1/2022        RESOLVED: TIA (transient ischemic attack) ICD-10-CM: G45.9  ICD-9-CM: 435.9  7/14/2016 - 6/1/2022        RESOLVED: Ex-smoker ICD-10-CM: Z81.358  ICD-9-CM: V15.82  7/14/2016 - 6/1/2022        RESOLVED: Diabetes mellitus type 2 in obese -- Diet controlled (Chronic) ICD-10-CM: E11.69, E66.9  ICD-9-CM: 250.00, 278.00  10/28/2014 - 6/1/2022              Greater than 30 minutes were spent with the patient on counseling and coordination of care    Signed:   Governor Alison MD  6/18/2022  10:37 AM

## 2022-06-18 NOTE — PROGRESS NOTES
Physician Progress Note      PATIENT:               Isabel Barraza  CSN #:                  [id number]  :                       1980  ADMIT DATE:       2022 5:31 PM  [address] DATE:  RESPONDING  PROVIDER #:        Lolis Carmen MD          QUERY TEXT:    Good morning  Patient admitted with TIA. Pt noted to have documented uncontrolled diabetes in H&P on ). Please document in progress notes and discharge summary further specificity regarding the control status of DM: The medical record reflects the following:  Risk Factors: DM2 uncontrolled, morbid obesity, Hx of CVA  Clinical Indicators: She said she stopped taking insulin  Treatment: lantus insulin, PO glipizide    Thank you  Evelyn Boudreaux RN CDI  [phone]  Options provided:  -- Uncontrolled DM with hyperglycemia  -- Uncontrolled DM with hypoglycemia  -- Other - I will add my own diagnosis  -- Disagree - Not applicable / Not valid  -- Disagree - Clinically unable to determine / Unknown  -- Refer to Clinical Documentation Reviewer    PROVIDER RESPONSE TEXT:    This patient has uncontrolled DM with hyperglycemia.     Query created by: Chantel Gaviria on 2022 11:01 AM      Electronically signed by:  Lolis Carmen MD 2022 10:31 AM

## 2022-06-18 NOTE — PROGRESS NOTES
8490  Patient woke up stating she was experiencing what she describes as asthma exacerbation. Patient states chest felt tight, difficulty getting air out causing her to cough and she felt like she had lump in her throat. Lungs were clear upon auscultation and SpO2 remained 98% with RR of 20. RT was paged and administered Duoneb to patient. Per the patient the duoneb did not help. At this time patient began to get more anxious but her O2 remained at 98%. Consulted with RT again who states they felt anxiety driven versus Respiratory. I spoke with Dr. Christa Jeffrey for further advisement. Dr. Christa Jeffrey ordered ativan as he also agreed with patient more anxious. 1245    Patient is s/p administration of ativan 1mg PO. She states that she feels calmer with no respiratory issues at this time. She is now resting comfortably in bed needs have been met.

## 2022-06-18 NOTE — PROGRESS NOTES
0700  Bedside and Verbal shift change report given to Lamar Allen RN (oncoming nurse) by Richelle Chiang RN (offgoing nurse). Report included the following information SBAR, Kardex, Procedure Summary, Intake/Output, MAR, Recent Results, and Med Rec Status. 1230  Per Dr. Kayla Mcdonough cancel D/C and proceed with MRI. Called MRI to check if Pt on schedule. MRI representative advised Pt is not on list for MRI despite order. Placed new order. 1800  Stroke Education provided to patient and the following topics were discussed    1. Patients personal risk factors for stroke are hypertension, smoking, family history, diabetes mellitus, obesity and prior stroke    2. Warning signs of Stroke:        * Sudden numbness or weakness of the face, arm or leg, especially on one side of          The body            * Sudden confusion, trouble speaking or understanding        * Sudden trouble seeing in one or both eyes        * Sudden trouble walking, dizziness, loss of balance or coordination        * Sudden severe headache with no known cause      3. Importance of activation Emergency Medical Services ( 9-1-1 ) immediately if experience any warning signs of stroke. 4. Be sure and schedule a follow-up appointment with your primary care doctor or any specialists as instructed. 5. You must take medicine every day to treat your risk factors for stroke. Be sure to take your medicines exactly as your doctor tells you: no more, no less. Know what your medicines are for , what they do. Anti-thrombotics /anticoagulants can help prevent strokes. You are taking the following medicine(s)  aspirin, atorvastatin, lovenox. 6.  Smoking and second-hand smoke greatly increase your risk of stroke, cardiovascular disease and death. Smoking history cigarettes, 1/3 pack per day    7. Information provided was [facility] Stroke Education Briseida Villar or Verbal Education    8.  Documentation of teaching completed in Patient Education Activity and on Care Plan with teaching response noted? Yes    1830  Pt advised has no ride to pick her up and does not have money for taxi/uber. Called NS to arrange round-trip. 1900  I have reviewed discharge instructions with the patient. The patient verbalized understanding. IVs removed. Tele box removed, tele notified. Box cleaned and placed in shelf beside Pt chart.

## 2022-06-18 NOTE — DISCHARGE INSTRUCTIONS
Discharge Instructions       PATIENT ID: Umang Monge  MRN: [id number]   YOB: 1980    DATE OF ADMISSION: 6/16/2022  5:31 PM    DATE OF DISCHARGE: 6/18/2022    PRIMARY CARE PROVIDER: Marsha Holland MD       DISCHARGING PHYSICIAN: Scarlet Turcios MD    To contact this individual call [phone] and ask the  to page. If unavailable ask to be transferred the Adult Hospitalist Department. DISCHARGE DIAGNOSES : TIA, Obesity, DM type 2, Uncontrolled hypertension    CONSULTATIONS: IP CONSULT TO NEUROLOGY    PROCEDURES/SURGERIES: * No surgery found *    PENDING TEST RESULTS:   At the time of discharge the following test results are still pending:     FOLLOW UP APPOINTMENTS:   Follow-up Information     Follow up With Specialties Details Why Contact Info    Marsha Holland MD Family Medicine In 1 week  [facility] [phone]             ADDITIONAL CARE RECOMMENDATIONS:     DIET: Diabetic Diet    ACTIVITY: Activity as tolerated    WOUND CARE:     EQUIPMENT needed:       DISCHARGE MEDICATIONS:   See Medication Reconciliation Form    · It is important that you take the medication exactly as they are prescribed. · Keep your medication in the bottles provided by the pharmacist and keep a list of the medication names, dosages, and times to be taken in your wallet. · Do not take other medications without consulting your doctor. NOTIFY YOUR PHYSICIAN FOR ANY OF THE FOLLOWING:   Fever over 101 degrees for 24 hours. Chest pain, shortness of breath, fever, chills, nausea, vomiting, diarrhea, change in mentation, falling, weakness, bleeding. Severe pain or pain not relieved by medications. Or, any other signs or symptoms that you may have questions about. DISPOSITION:  x  Home With:   OT  PT  HH  RN       SNF/Inpatient Rehab/LTAC    Independent/assisted living    Hospice    Other:     CDMP Checked:    Yes X       Signed:   Scarlet Turcios MD  6/18/2022  10:57 AM

## 2022-06-20 ENCOUNTER — PATIENT OUTREACH (OUTPATIENT)
Dept: CASE MANAGEMENT | Age: 42
End: 2022-06-20

## 2022-06-20 LAB
ATRIAL RATE: 90 BPM
CALCULATED P AXIS, ECG09: 41 DEGREES
CALCULATED R AXIS, ECG10: 30 DEGREES
CALCULATED T AXIS, ECG11: 13 DEGREES
DIAGNOSIS, 93000: NORMAL
P-R INTERVAL, ECG05: 142 MS
Q-T INTERVAL, ECG07: 396 MS
QRS DURATION, ECG06: 74 MS
QTC CALCULATION (BEZET), ECG08: 484 MS
VENTRICULAR RATE, ECG03: 90 BPM

## 2022-06-21 ENCOUNTER — VIRTUAL VISIT (OUTPATIENT)
Dept: FAMILY MEDICINE CLINIC | Age: 42
End: 2022-06-21
Payer: COMMERCIAL

## 2022-06-21 DIAGNOSIS — H18.621 ACUTE HYDROPS KERATOCONUS OF RIGHT EYE: ICD-10-CM

## 2022-06-21 DIAGNOSIS — G89.29 CHRONIC PAIN OF RIGHT KNEE: ICD-10-CM

## 2022-06-21 DIAGNOSIS — G45.9 TIA (TRANSIENT ISCHEMIC ATTACK): ICD-10-CM

## 2022-06-21 DIAGNOSIS — Z09 HOSPITAL DISCHARGE FOLLOW-UP: Primary | ICD-10-CM

## 2022-06-21 DIAGNOSIS — M25.361 INSTABILITY OF KNEE JOINT, RIGHT: ICD-10-CM

## 2022-06-21 DIAGNOSIS — I10 ESSENTIAL HYPERTENSION: ICD-10-CM

## 2022-06-21 DIAGNOSIS — F43.21 ADJUSTMENT DISORDER WITH DEPRESSED MOOD: ICD-10-CM

## 2022-06-21 DIAGNOSIS — F41.9 ANXIETY: ICD-10-CM

## 2022-06-21 DIAGNOSIS — E11.69 DIABETES MELLITUS TYPE 2 IN OBESE (HCC): ICD-10-CM

## 2022-06-21 DIAGNOSIS — M25.561 CHRONIC PAIN OF RIGHT KNEE: ICD-10-CM

## 2022-06-21 DIAGNOSIS — E66.9 DIABETES MELLITUS TYPE 2 IN OBESE (HCC): ICD-10-CM

## 2022-06-21 DIAGNOSIS — J45.50 SEVERE PERSISTENT ASTHMA WITHOUT COMPLICATION: ICD-10-CM

## 2022-06-21 DIAGNOSIS — E66.01 MORBID OBESITY WITH BMI OF 50.0-59.9, ADULT (HCC): ICD-10-CM

## 2022-06-21 PROCEDURE — 99214 OFFICE O/P EST MOD 30 MIN: CPT | Performed by: FAMILY MEDICINE

## 2022-06-21 PROCEDURE — 1111F DSCHRG MED/CURRENT MED MERGE: CPT | Performed by: FAMILY MEDICINE

## 2022-06-21 RX ORDER — ESCITALOPRAM OXALATE 20 MG/1
20 TABLET ORAL DAILY
Qty: 90 TABLET | Refills: 0 | Status: SHIPPED | OUTPATIENT
Start: 2022-06-21 | End: 2022-08-05 | Stop reason: SDUPTHER

## 2022-06-21 RX ORDER — ACETAMINOPHEN AND CODEINE PHOSPHATE 300; 30 MG/1; MG/1
1 TABLET ORAL
Qty: 15 TABLET | Refills: 0 | Status: SHIPPED | OUTPATIENT
Start: 2022-06-21 | End: 2022-06-26

## 2022-06-21 RX ORDER — BUDESONIDE, GLYCOPYRROLATE, AND FORMOTEROL FUMARATE 160; 9; 4.8 UG/1; UG/1; UG/1
AEROSOL, METERED RESPIRATORY (INHALATION) 2 TIMES DAILY
COMMUNITY

## 2022-06-21 NOTE — Clinical Note
Could we get her blood pressure in the next few days? We will plan to follow-up in 2 weeks and may use home blood pressure readings for virtual or have her in the office.   Thanks

## 2022-06-21 NOTE — PROGRESS NOTES
Virtual Video Transitional Care Management Progress Note    Patient: Louise Rizo  : 1980  PCP: Linh Capuot MD    Date of office visit: 2022   Date of admission: 22  Date of discharge: 22  Hospital: [facility]    Call initiated w/i 2 business dates of discharge: Yes   Date of the most recent call to the patient: 2022  9:42 AM        Assessment/Plan:     Diagnoses and all orders for this visit:    1. Hospital discharge follow-up  -     [place] DISCHARGE MEDS RECONCILED W/ CURRENT OUTPATIENT MED LIST    2. Anxiety  3. Adjustment disorder with depressed mood  -Increasing Lexapro to 20 mg daily and discussed using Klonopin 1 mg in the a.m. and 0.5 mg in p.m. We will monitor for the next 2 weeks and may need to adjust prescription at that time. Encouraged following with therapist soon  -     escitalopram oxalate (LEXAPRO) 20 mg tablet; Take 1 Tablet by mouth daily. 4. TIA (transient ischemic attack)-picture seems most consistent with stress-induced transient neurological symptoms    5. Essential hypertension- controlled at discharge but patient unable to check BP at home today. We will try to find her home BP cuff and check BP over the next day. Follow-up in 2 weeks    6. Diabetes mellitus type 2 in obese ([address]- near goal with A1c on 2022 of 7.1%    7. Severe persistent asthma without complication-had been an ongoing issue with numerous exacerbations and hospitalizations but no significant symptoms for nearly 1 year. Has had difficulty coordinating and getting approval from insurance of [employer]. Was on Nucala for short period and planning to switch to Dupixent. 8. Chronic pain of right knee  9. Instability of knee joint, right  -Needs physical therapy for right knee pain and instability but is currently homebound due to vision difficulties so setting up with home health physical therapy  -     [facility]    10.  Acute hydrops keratoconus of right eye- continues working with ophthalmology and planning for surgery in the near future. -     acetaminophen-codeine (Tylenol-Codeine #3) 300-30 mg per tablet; Take 1 Tablet by mouth every six (6) hours as needed for Pain for up to 5 days. Max Daily Amount: 4 Tablets. 11. Morbid obesity with BMI of 50.0-59.9, adult ([address]- has been working on this but significant barriers and recent hospitalizations. Encouraged working on diet and daily exercise. Continue with Trulicity and may plan to increase dose at next appointment. The complexity of medical decision making for this patient's transitional care is moderate   Follow-up and Dispositions    · Return in 1 month (on 7/21/2022), or if symptoms worsen or fail to improve. Subjective:   Camila Chambers is a 38 y.o. female presenting today for follow-up after hospital discharge. This encounter and supporting documentation was reviewed if available. Medication reconciliation was performed today. The main problem requiring admission was TIA. Complications during admission: none    Pt had Aphasia with left-sided weakness and symptoms rapidly resolved prior to going from the [facility] to [address] [place] [address] but then recurred a second time. She was thought to have TIAs and was discharged in stable condition with no deficits. Neurology saw the patient during admission. MRI brain with no concerns for CVA. Diagnosis was most consistent with stress related transient neurological symptoms. Not checking BPs. Sig stressors recently with not working, harley in hospital and coming out from rehab with challenging social dynamics related to billie's mother, recent admission for SI with overdose. Still trying to find     Knee starting to hurt more recently. Previously had this issue and had 2 falls as knee gave way. He also reports lower back flared recently and this is improving with heat. Still with pain in eyes from hydrops.   Feels very red and \"like sandpaper\". Unable to get eye measurements today as she was unable to drive. Hospitalist discharge summary reviewed:  \"Aphasia and Left Sided weakness: Likely secondary to TIA  There is no focal deficits on admission  Head CT scan negative  Head & neck CTA with contrast did not reveal any flow limiting stenosis or aneurysm. No ICA  MRI brain no stroke  Echo performed 5/12: EF 50-55%. Mild concentric hypertrophy  Continue aspirin 81 mg po daily. Stop aspirin 325 mg  Appreciate neurology eval     Accelerated Hypertension   BP better controlled  Continue Norvasc and Cozaar   Started labetalol 100 mg BID  F/u with PCP for BP check and optimization of anti-hypertensive meds\"    Medications marked \"taking\" at this time:  Home Medications    Medication Sig Start Date End Date Taking? Authorizing Provider   budesonide-glycopyr-formoterol (Gerlene Newton) 160-9-4.8 mcg/actuation HFAA Take  by inhalation two (2) times a day. Yes Provider, Historical   escitalopram oxalate (LEXAPRO) 20 mg tablet Take 1 Tablet by mouth daily. 6/21/22  Yes Elian Bryson MD   acetaminophen-codeine (Tylenol-Codeine #3) 300-30 mg per tablet Take 1 Tablet by mouth every six (6) hours as needed for Pain for up to 5 days. Max Daily Amount: 4 Tablets. 6/21/22 6/26/22 Yes Elian Bryson MD   losartan (COZAAR) 100 mg tablet Take 1 Tablet by mouth daily. Indications: high blood pressure 6/19/22  Yes Klarissa Ochoa MD   amLODIPine (NORVASC) 10 mg tablet Take 1 Tablet by mouth daily. Indications: high blood pressure 6/19/22  Yes Klarissa Ochoa MD   clonazePAM (KlonoPIN) 0.5 mg tablet Take 1 Tablet by mouth two (2) times daily as needed for Anxiety. Max Daily Amount: 1 mg. 5/13/22  Yes Elian Bryson MD   Blood-Glucose Sensor (Dexcom G6 Sensor) ta Check sugars 5-6 x per day.   Needs filled early due to problem with sensor recently 4/22/22  Yes Elian Bryson MD   glipiZIDE (GLUCOTROL) 5 mg tablet Take 1 Tablet by mouth Daily (before dinner). 4/22/22  Yes Yadira Glaser MD   Blood-Glucose Transmitter (Dexcom G6 Transmitter) ta Check sugars 5-6 x per day 3/9/22  Yes Yadira Glaser MD   pantoprazole (PROTONIX) 40 mg tablet Take 1 Tablet by mouth daily. 12/7/21  Yes Yadira Glaser MD   montelukast (SINGULAIR) 10 mg tablet Take 1 Tablet by mouth daily. 12/7/21  Yes Yadira Glaser MD   albuterol (PROVENTIL HFA, VENTOLIN HFA, PROAIR HFA) 90 mcg/actuation inhaler Take 2 Puffs by inhalation every four (4) hours as needed for Wheezing. 12/7/21  Yes Yadira Glaser MD   atorvastatin (LIPITOR) 20 mg tablet Take 1 Tablet by mouth daily. 12/7/21  Yes Yadira Glaser MD   budesonide (PULMICORT) 1 mg/2 mL nbsp 2 mL by Nebulization route two (2) times daily as needed for Cough. Do not use on days when using Advair 5/12/21  Yes Yadira Glaser MD   magnesium oxide (MAG-OX) 400 mg tablet Take 1 Tab by mouth daily. 5/12/21  Yes Yadira Glaser MD   zinc sulfate (ZINC-220 PO) Take  by mouth daily. Yes Provider, Historical   cetirizine (ZYRTEC) 10 mg tablet Take 10 mg by mouth daily as needed for Allergies. Yes Provider, Historical   aspirin delayed-release 81 mg tablet Take 81 mg by mouth daily. Yes Provider, Historical   albuterol (PROVENTIL VENTOLIN) 2.5 mg /3 mL (0.083 %) nebu  10/21/20  Yes Provider, Historical   ascorbic acid, vitamin C, (Vitamin C) 500 mg tablet Take 500 mg by mouth daily. Yes Provider, Historical   cranberry extract 450 mg tab tablet Take 450 mg by mouth daily. Yes Provider, Historical   labetaloL (NORMODYNE) 100 mg tablet Take 1 Tablet by mouth two (2) times a day.  Indications: high blood pressure  Patient not taking: Reported on 6/21/2022 6/18/22   Danielle Herzog MD   escitalopram oxalate (LEXAPRO) 10 mg tablet Take 1/2 tab by mouth daily x 1 week and then increase to 1 tab daily  Patient taking differently: Take  1 tab daily 6/8/22 6/21/22  Yadira Glaser MD acetaminophen-codeine (Tylenol-Codeine #3) 300-30 mg per tablet Take 1 Tablet by mouth every four (4) hours as needed for Pain for up to 5 days. Max Daily Amount: 6 Tablets. 5/27/22 6/21/22  Chaya Campoverde MD   dulaglutide (TRULICITY) 1.5 XS/8.1 mL sub-q pen 0.5 mL by SubCUTAneous route every seven (7) days. Use zero copay voucher pt will bring for 4 pens please. Patient not taking: Reported on 6/21/2022 3/23/22   Chaya Campoverde MD   HumaLOG U-100 Insulin 100 unit/mL injection Take 25 units before lunch and sliding scale at bedtime as directed  Patient not taking: Reported on 6/21/2022 12/7/21   Chaya Campoverde MD   fluticasone propion-salmeteroL (Advair Diskus) 500-50 mcg/dose diskus inhaler Take 1 Puff by inhalation two (2) times a day. 8/16/21 6/21/22  Chaya Campoverde MD   tiotropium bromide (SPIRIVA RESPIMAT) 2.5 mcg/actuation inhaler Take 2 Puffs by inhalation daily. 5/12/21 6/21/22  Chaya Campoverde MD   TRUEplus Insulin 1 mL 31 gauge x 5/16 syrg Use with insulin  Patient not taking: Reported on 6/16/2022 12/17/20   Chaya Campoverde MD   hydroquinone (ESOTERICA, MELQUIN) 4 % topical cream Apply  to affected area two (2) times a day. Patient not taking: Reported on 6/21/2022 3/3/20   Chaya Campoverde MD        Review of Systems: per HPI       Patient Active Problem List   Diagnosis Code    Morbid obesity ([facility] E66.01    Hypertension I10    DM (diabetes mellitus), type 2 ([facility] E11.9    Asthma J45.909    Anxiety and depression F41.9, F32. A    Overdose, intentional self-harm, [facility] encounter [facility] T50.902A    Suicidal ideation R45.851    TIA (transient ischemic attack) G45.9     Patient Active Problem List    Diagnosis Date Noted    TIA (transient ischemic attack) 06/16/2022    Overdose, intentional self-harm, initial encounter ([facility] 06/01/2022    Suicidal ideation 06/01/2022    DM (diabetes mellitus), type 2 ([facility]     Asthma     Anxiety and depression     Hypertension 07/14/2016    Morbid obesity ([facility] 10/28/2014     Current Outpatient Medications   Medication Sig Dispense Refill    budesonide-glycopyr-formoterol (Breztri Aerosphere) 160-9-4.8 mcg/actuation HFAA Take  by inhalation two (2) times a day.  escitalopram oxalate (LEXAPRO) 20 mg tablet Take 1 Tablet by mouth daily. 90 Tablet 0    acetaminophen-codeine (Tylenol-Codeine #3) 300-30 mg per tablet Take 1 Tablet by mouth every six (6) hours as needed for Pain for up to 5 days. Max Daily Amount: 4 Tablets. 15 Tablet 0    losartan (COZAAR) 100 mg tablet Take 1 Tablet by mouth daily. Indications: high blood pressure 30 Tablet 2    amLODIPine (NORVASC) 10 mg tablet Take 1 Tablet by mouth daily. Indications: high blood pressure 60 Tablet 3    clonazePAM (KlonoPIN) 0.5 mg tablet Take 1 Tablet by mouth two (2) times daily as needed for Anxiety. Max Daily Amount: 1 mg. 60 Tablet 1    Blood-Glucose Sensor (Dexcom G6 Sensor) ta Check sugars 5-6 x per day. Needs filled early due to problem with sensor recently 9 Each 2    glipiZIDE (GLUCOTROL) 5 mg tablet Take 1 Tablet by mouth Daily (before dinner). 90 Tablet 1    Blood-Glucose Transmitter (Dexcom G6 Transmitter) ta Check sugars 5-6 x per day 1 Each 0    pantoprazole (PROTONIX) 40 mg tablet Take 1 Tablet by mouth daily. 90 Tablet 2    montelukast (SINGULAIR) 10 mg tablet Take 1 Tablet by mouth daily. 90 Tablet 2    albuterol (PROVENTIL HFA, VENTOLIN HFA, PROAIR HFA) 90 mcg/actuation inhaler Take 2 Puffs by inhalation every four (4) hours as needed for Wheezing. 3 Each 1    atorvastatin (LIPITOR) 20 mg tablet Take 1 Tablet by mouth daily. 90 Tablet 1    budesonide (PULMICORT) 1 mg/2 mL nbsp 2 mL by Nebulization route two (2) times daily as needed for Cough. Do not use on days when using Advair 270 Each 1    magnesium oxide (MAG-OX) 400 mg tablet Take 1 Tab by mouth daily. 90 Tab 1    zinc sulfate (ZINC-220 PO) Take  by mouth daily.       cetirizine (ZYRTEC) 10 mg tablet Take 10 mg by mouth daily as needed for Allergies.  aspirin delayed-release 81 mg tablet Take 81 mg by mouth daily.  albuterol (PROVENTIL VENTOLIN) 2.5 mg /3 mL (0.083 %) nebu       ascorbic acid, vitamin C, (Vitamin C) 500 mg tablet Take 500 mg by mouth daily.  cranberry extract 450 mg tab tablet Take 450 mg by mouth daily.  labetaloL (NORMODYNE) 100 mg tablet Take 1 Tablet by mouth two (2) times a day. Indications: high blood pressure (Patient not taking: Reported on 6/21/2022) 30 Tablet 2    dulaglutide (TRULICITY) 1.5 NU/8.0 mL sub-q pen 0.5 mL by SubCUTAneous route every seven (7) days. Use zero copay voucher pt will bring for 4 pens please. (Patient not taking: Reported on 6/21/2022) 4 Each 0    HumaLOG U-100 Insulin 100 unit/mL injection Take 25 units before lunch and sliding scale at bedtime as directed (Patient not taking: Reported on 6/21/2022) 10 mL 2    TRUEplus Insulin 1 mL 31 gauge x 5/16 syrg Use with insulin (Patient not taking: Reported on 6/16/2022) 200 Syringe 2    hydroquinone (ESOTERICA, MELQUIN) 4 % topical cream Apply  to affected area two (2) times a day.  (Patient not taking: Reported on 6/21/2022) 30 g 0     Allergies   Allergen Reactions    Metformin Other (comments)     Hair loss and unstable blood sugars    Pcn [Penicillins] Swelling     Past Medical History:   Diagnosis Date    Alcohol abuse     Anxiety and depression     Asthma     DM (diabetes mellitus), type 2 ([address]     Morbid obesity ([facility] 10/28/2014    Stroke [facility])      Past Surgical History:   Procedure Laterality Date    HX GYN      3 C-sections    HX GYN      Miscarriage     Family History   Problem Relation Age of Onset    Hypertension Maternal Aunt     Hypertension Maternal Uncle     Thyroid Disease Maternal Uncle     Hypertension Maternal Grandmother     Cancer Maternal Grandfather     Hypertension Maternal Grandfather     Diabetes Paternal Grandmother     Cancer Paternal Grandmother     Hypertension Mother     Alcohol abuse Father     Substance Abuse Father     Hypertension Maternal Aunt     Hypertension Maternal Uncle     Stroke Maternal Uncle     Hypertension Maternal Uncle     Alcohol abuse Maternal Uncle     Hypertension Maternal Uncle     Hypertension Maternal Uncle     Arrhythmia Maternal Uncle      Social History     Tobacco Use    Smoking status: Current Every Day Smoker     Packs/day: 1.00     Types: Cigarettes     Start date: 2014     Last attempt to quit: 2016     Years since quittin.4    Smokeless tobacco: Never Used   Substance Use Topics    Alcohol use: Yes     Alcohol/week: 0.0 standard drinks     Comment: daily         Objective:     Patient-Reported Vitals 2022   Patient-Reported Weight 300.2lbs   Patient-Reported Pulse -   Patient-Reported Temperature -   Patient-Reported Systolic  245   Patient-Reported Diastolic 68      General: alert, cooperative, no distress, in pain with eyes closed   Mental  status: normal mood, behavior, speech, dress, motor activity, and thought processes, able to follow commands   HENT: NCAT   Neck: no visualized mass   Resp: no respiratory distress   Neuro: no gross deficits   Skin: no discoloration or lesions of concern on visible areas   Psychiatric: normal affect, consistent with stated mood, no evidence of hallucinations     Additional exam findings: We discussed the expected course, resolution and complications of the diagnosis(es) in detail. Medication risks, benefits, costs, interactions, and alternatives were discussed as indicated. I advised her to contact the office if her condition worsens, changes or fails to improve as anticipated. She expressed understanding with the diagnosis(es) and plan.        [facility], who was evaluated through a synchronous (real-time) audio-video encounter and/or her healthcare decision maker, is aware that it is a billable service, with coverage as determined by her insurance carrier. She provided verbal consent to proceed: Yes, and patient identification was verified. It was conducted pursuant to the emergency declaration under the [address] [place] [address] and the [employer] Act. A caregiver was present when appropriate. Ability to conduct physical exam was limited. I was in the office. The patient was at home.       Justin Graff MD

## 2022-06-21 NOTE — PROGRESS NOTES
Care Transitions Outreach Attempt    Call within 2 business days of discharge: Yes   Attempted to reach patient for transitions of care follow up. Unable to reach patient. Line has been busy, not able to leave VM. Left VM for EC/spouse. [employer] message sent. CTN will make 3rd attempt to reach in 2-3 business days. Patient: Junie Frey Patient : 1980 MRN: [id number]    Last Discharge [address]       Complaint Diagnosis Description Type Department Provider    22 Aphasia Aphasia . .. ED to Hosp-Admission (Discharged) (ADMIT) Lucille Shirley MD; Renetta Mera,... Was this an external facility discharge?  No       Noted following upcoming appointments from discharge chart review:   [facility] follow up appointment(s):   Future Appointments   Date Time Provider Alex Kilpatrick   2022  4:00 PM Olivia Lombardo MD [facility] BS AMB   2022 11:00 AM [facility] CT 1 SFMRCT  [facility]   10/7/2022  9:20 AM DO DUANE MillerSF BS AMB     Non-[facility] follow up appointment(s): NA

## 2022-06-21 NOTE — PROGRESS NOTES
Chief Complaint   Patient presents with   [facility] Follow Up     Admitted 6/16/22 Discharged 6/18/22   1. Have you been to the ER, urgent care clinic since your last visit? Hospitalized since your last visit? Yes [facility] [address] [place] [address] 6/16/22 TIA    2. Have you seen or consulted any other health care providers outside of the [address] [place] [address] since your last visit? Include any pap smears or colon screening.  No

## 2022-06-24 ENCOUNTER — PATIENT OUTREACH (OUTPATIENT)
Dept: CASE MANAGEMENT | Age: 42
End: 2022-06-24

## 2022-06-24 NOTE — PROGRESS NOTES
CTN has not been able to reach patient after x 3 attempts. CTN resolving post discharge episode. CTN will not make further attempts to reach. Note: patient has VV with PCP on 6/21.    Future Appointments   Date Time Provider Alex Kilpatrick   7/11/2022 11:30 AM Gardenia Ortega MD [facility] BS AMB   7/20/2022 11:00 AM [facility] CT 1 SFMRCT [facility]   10/7/2022  9:20 AM DO ZAIRE Nunn BS AMB

## 2022-07-02 DIAGNOSIS — E11.65 UNCONTROLLED TYPE 2 DIABETES MELLITUS WITH HYPERGLYCEMIA (HCC): ICD-10-CM

## 2022-07-03 RX ORDER — BLOOD-GLUCOSE TRANSMITTER
EACH MISCELLANEOUS
Qty: 1 EACH | Refills: 0 | Status: SHIPPED | OUTPATIENT
Start: 2022-07-03 | End: 2022-07-11 | Stop reason: SDUPTHER

## 2022-07-06 ENCOUNTER — TELEPHONE (OUTPATIENT)
Dept: FAMILY MEDICINE CLINIC | Age: 42
End: 2022-07-06

## 2022-07-06 DIAGNOSIS — H18.603 KERATOCONUS OF BOTH EYES: ICD-10-CM

## 2022-07-06 DIAGNOSIS — H18.20 CORNEAL EDEMA OF RIGHT EYE: ICD-10-CM

## 2022-07-06 DIAGNOSIS — F43.21 ADJUSTMENT DISORDER WITH DEPRESSED MOOD: Primary | ICD-10-CM

## 2022-07-06 DIAGNOSIS — T50.902A OVERDOSE, INTENTIONAL SELF-HARM, INITIAL ENCOUNTER (HCC): ICD-10-CM

## 2022-07-08 RX ORDER — ACETAMINOPHEN AND CODEINE PHOSPHATE 300; 30 MG/1; MG/1
1 TABLET ORAL
Qty: 28 TABLET | Refills: 0 | Status: SHIPPED | OUTPATIENT
Start: 2022-07-08 | End: 2022-08-08 | Stop reason: SDUPTHER

## 2022-07-11 ENCOUNTER — VIRTUAL VISIT (OUTPATIENT)
Dept: FAMILY MEDICINE CLINIC | Age: 42
End: 2022-07-11
Payer: COMMERCIAL

## 2022-07-11 DIAGNOSIS — J45.50 SEVERE PERSISTENT ASTHMA WITHOUT COMPLICATION: ICD-10-CM

## 2022-07-11 DIAGNOSIS — I10 ESSENTIAL HYPERTENSION: Primary | ICD-10-CM

## 2022-07-11 DIAGNOSIS — H18.621 ACUTE HYDROPS KERATOCONUS OF RIGHT EYE: ICD-10-CM

## 2022-07-11 DIAGNOSIS — F43.21 ADJUSTMENT DISORDER WITH DEPRESSED MOOD: ICD-10-CM

## 2022-07-11 DIAGNOSIS — E66.9 DIABETES MELLITUS TYPE 2 IN OBESE (HCC): ICD-10-CM

## 2022-07-11 DIAGNOSIS — H18.20 CORNEAL EDEMA OF RIGHT EYE: ICD-10-CM

## 2022-07-11 DIAGNOSIS — E11.65 UNCONTROLLED TYPE 2 DIABETES MELLITUS WITH HYPERGLYCEMIA (HCC): ICD-10-CM

## 2022-07-11 DIAGNOSIS — E11.69 DIABETES MELLITUS TYPE 2 IN OBESE (HCC): ICD-10-CM

## 2022-07-11 DIAGNOSIS — K21.9 GASTROESOPHAGEAL REFLUX DISEASE, UNSPECIFIED WHETHER ESOPHAGITIS PRESENT: ICD-10-CM

## 2022-07-11 DIAGNOSIS — H18.603 KERATOCONUS OF BOTH EYES: ICD-10-CM

## 2022-07-11 PROCEDURE — 99214 OFFICE O/P EST MOD 30 MIN: CPT | Performed by: FAMILY MEDICINE

## 2022-07-11 PROCEDURE — 3051F HG A1C>EQUAL 7.0%<8.0%: CPT | Performed by: FAMILY MEDICINE

## 2022-07-11 RX ORDER — TRAZODONE HYDROCHLORIDE 50 MG/1
50 TABLET ORAL
Qty: 30 TABLET | Refills: 0 | Status: SHIPPED | OUTPATIENT
Start: 2022-07-11 | End: 2022-08-08 | Stop reason: ALTCHOICE

## 2022-07-11 RX ORDER — MONTELUKAST SODIUM 10 MG/1
10 TABLET ORAL DAILY
Qty: 90 TABLET | Refills: 2 | Status: SHIPPED | OUTPATIENT
Start: 2022-07-11 | End: 2022-08-05 | Stop reason: SDUPTHER

## 2022-07-11 RX ORDER — BLOOD-GLUCOSE TRANSMITTER
EACH MISCELLANEOUS
Qty: 1 EACH | Refills: 1 | Status: SHIPPED | OUTPATIENT
Start: 2022-07-11 | End: 2022-08-05 | Stop reason: SDUPTHER

## 2022-07-11 RX ORDER — LABETALOL 100 MG/1
100 TABLET, FILM COATED ORAL 2 TIMES DAILY
Qty: 90 TABLET | Refills: 2 | Status: CANCELLED | OUTPATIENT
Start: 2022-07-11

## 2022-07-11 RX ORDER — LOSARTAN POTASSIUM 100 MG/1
100 TABLET ORAL DAILY
Qty: 90 TABLET | Refills: 2 | Status: SHIPPED | OUTPATIENT
Start: 2022-07-11 | End: 2022-08-05 | Stop reason: SDUPTHER

## 2022-07-11 RX ORDER — PANTOPRAZOLE SODIUM 40 MG/1
40 TABLET, DELAYED RELEASE ORAL DAILY
Qty: 90 TABLET | Refills: 2 | Status: SHIPPED | OUTPATIENT
Start: 2022-07-11 | End: 2022-08-05 | Stop reason: SDUPTHER

## 2022-07-11 RX ORDER — ATENOLOL 50 MG/1
50 TABLET ORAL DAILY
Qty: 90 TABLET | Refills: 0 | Status: SHIPPED | OUTPATIENT
Start: 2022-07-11 | End: 2022-08-05 | Stop reason: SDUPTHER

## 2022-07-11 RX ORDER — ATORVASTATIN CALCIUM 20 MG/1
20 TABLET, FILM COATED ORAL DAILY
Qty: 90 TABLET | Refills: 1 | Status: SHIPPED | OUTPATIENT
Start: 2022-07-11 | End: 2022-08-05 | Stop reason: SDUPTHER

## 2022-07-11 RX ORDER — AMLODIPINE BESYLATE 10 MG/1
10 TABLET ORAL DAILY
Qty: 180 TABLET | Refills: 2 | Status: SHIPPED | OUTPATIENT
Start: 2022-07-11 | End: 2022-08-05 | Stop reason: SDUPTHER

## 2022-07-11 RX ORDER — BUPROPION HYDROCHLORIDE 150 MG/1
150 TABLET ORAL DAILY
Qty: 30 TABLET | Refills: 0 | Status: SHIPPED | OUTPATIENT
Start: 2022-07-11 | End: 2022-08-08 | Stop reason: SDUPTHER

## 2022-07-11 RX ORDER — PREDNISONE 10 MG/1
10 TABLET ORAL DAILY
COMMUNITY
Start: 2022-06-29 | End: 2022-07-28 | Stop reason: SDUPTHER

## 2022-07-11 NOTE — PROGRESS NOTES
Chief Complaint   Patient presents with    Follow-up     3 week   1. Have you been to the ER, urgent care clinic since your last visit? Hospitalized since your last visit? No    2. Have you seen or consulted any other health care providers outside of the [address] [place] [address] since your last visit? Include any pap smears or colon screening.  No     Took BP medication @9:30 am

## 2022-07-11 NOTE — PROGRESS NOTES
[facility] (: 1980) is a 38 y.o. female, established patient, here for evaluation of the following chief complaint(s):   Follow-up (3 week)       ASSESSMENT/PLAN:  Ongoing issues with Depression and insomnia so adding Wellbutrin daily to Lexapro. Planning to pull away from Klonopin in the next few months and will use Trazodone PRN to help with sx. May need to move towards other options like Risperdal or Abilify. Sugars running lower and may need to back off insulin based on sugars as weight improves and asthma stabilizes with less need for Prednisone. HTN doing well. Asthma has been doing better. To follow up with Pulm, was trying to get Dupixant covered after being on Nucala. Has follow up with Ophtho and planning for surgery at some point. Discussed numerous stressors as well. Sig financial challenges right now with 3 kids and neither nor her domestic partner are able to work due to health issues. Her partner is recovering well and hopes to return to work in the next few months. Pt would like to return to work but is struggling with her vision and eye pain. Below is the assessment and plan developed based on review of pertinent history, labs, studies, and medications. 1. Essential hypertension  2. Adjustment disorder with depressed mood  -     buPROPion XL (WELLBUTRIN XL) 150 mg tablet; Take 1 Tablet by mouth daily. , Normal, Disp-30 Tablet, R-0  -     traZODone (DESYREL) 50 mg tablet; Take 1 Tablet by mouth nightly., Normal, Disp-30 Tablet, R-0  3. Uncontrolled type 2 diabetes mellitus with hyperglycemia ([address]  4. Gastroesophageal reflux disease, unspecified whether esophagitis present  5. Severe persistent asthma without complication  6. Diabetes mellitus type 2 in obese (HCC)  7. Keratoconus of both eyes  8. Acute hydrops keratoconus of right eye  9.  Corneal edema of right eye      Return in about 4 weeks (around 2022), or if symptoms worsen or fail to improve. Subjective:   42-year-old AAF with PMH significant for DM 2, HTN, TIA (remote), stress related transient neurological symptoms (recently), severe persistent asthma, GERD, and adjustment disorder with anxious and depressed mood for routine f/u.    2 recent hospitalizations - 1 for intentional self harm (took too many pills and immediately regretted) then had stress related transient neurological symptoms presenting with aphasia (initially concerned for TIA/CVA). MRI brain with no concerns for CVA. Doing much better today. Dealing with hoarseness this am.    Trouble with memory recently. Trouble sleeping - tried OTC sleep aids and benadryl. Only sleeping 3 hours uninterrupted for about 1 week now. Having crying spells - long periods up to 1 hour. Taking Lexapro 20 mg daily and Klonopin 1.5 mg per day. Using [employer] randolph to help with panic attacks. Panic attacks down to < 1 x per day (prev much worse). Has decreased appetite. Hydrops/keratoconus/corneal edema - Struggling with eye pain. Using heating pad and APAP #3 sparingly. DM2 - Sugars running too low recently. Forcing herself to eat ensure. She is still on Prednisone 10 mg daily for asthma. Unable to do PT last week. A1C down to 7.1% on current regimen with insulin. Sig stressors recently with not working, harley in hospital and coming out from rehab with challenging social dynamics related to billie's mother, recent admission for SI with overdose. Still awaiting psych.     ROS  Gen - no fever/chills  Resp - no dyspnea or cough  CV - no chest pain or DURON  Rest per HPI    Past Medical History:   Diagnosis Date    Alcohol abuse     Anxiety and depression     Asthma     DM (diabetes mellitus), type 2 ([facility] [address]     Morbid obesity ([facility] [address] 10/28/2014    Stroke [facility])      Past Surgical History:   Procedure Laterality Date    HX GYN      3 C-sections    HX GYN      Miscarriage        Objective:     No data recorded     Physical exam:  General appearance - alert, well appearing, and in no distress  Eyes -sclera anicteric, eyes closed for much of visit d/t pain  HEENT- normocephalic, atraumatic, moist mucous membranes, no visualized neck mass  Chest -normal respiratory effort, no visualized signs of respiratory distress  Neurological - alert, awake, normal speech, no focal findings or movement disorder noted  Psych - normal mood and affect  Skin- no apparent lesions    On this date 07/11/2022 I have spent 30 minutes reviewing previous notes, test results and face to face (virtual) with the patient discussing the diagnosis and importance of compliance with the treatment plan as well as documenting on the day of the visit. [facility], was evaluated through a synchronous (real-time) audio-video encounter. The patient (or guardian if applicable) is aware that this is a billable service, which includes applicable co-pays. This Virtual Visit was conducted with patient's (and/or legal guardian's) consent. The visit was conducted pursuant to the emergency declaration under the [address] [place] [address] authority and the [employer] General Act. Patient identification was verified, and a caregiver was present when appropriate. The patient was located at: Home: [phone]  The provider was located at: Home:        An electronic signature was used to authenticate this note.   -- Yuan Hernandez MD

## 2022-07-28 DIAGNOSIS — F41.1 ANXIETY AS ACUTE REACTION TO EXCEPTIONAL STRESS: ICD-10-CM

## 2022-07-28 DIAGNOSIS — F43.0 ANXIETY AS ACUTE REACTION TO EXCEPTIONAL STRESS: ICD-10-CM

## 2022-07-28 DIAGNOSIS — F43.21 ADJUSTMENT DISORDER WITH DEPRESSED MOOD: ICD-10-CM

## 2022-07-29 RX ORDER — CLONAZEPAM 0.5 MG/1
0.5 TABLET ORAL 2 TIMES DAILY
Qty: 60 TABLET | Refills: 0 | Status: SHIPPED | OUTPATIENT
Start: 2022-07-29

## 2022-07-29 RX ORDER — PREDNISONE 10 MG/1
10 TABLET ORAL DAILY
Qty: 90 TABLET | Refills: 1 | Status: SHIPPED | OUTPATIENT
Start: 2022-07-29

## 2022-08-05 DIAGNOSIS — K21.9 GASTROESOPHAGEAL REFLUX DISEASE, UNSPECIFIED WHETHER ESOPHAGITIS PRESENT: ICD-10-CM

## 2022-08-05 DIAGNOSIS — E11.65 UNCONTROLLED TYPE 2 DIABETES MELLITUS WITH HYPERGLYCEMIA (HCC): ICD-10-CM

## 2022-08-05 DIAGNOSIS — E66.9 DIABETES MELLITUS TYPE 2 IN OBESE (HCC): ICD-10-CM

## 2022-08-05 DIAGNOSIS — F43.21 ADJUSTMENT DISORDER WITH DEPRESSED MOOD: ICD-10-CM

## 2022-08-05 DIAGNOSIS — E11.69 DIABETES MELLITUS TYPE 2 IN OBESE (HCC): ICD-10-CM

## 2022-08-05 DIAGNOSIS — J45.50 SEVERE PERSISTENT ASTHMA WITHOUT COMPLICATION: ICD-10-CM

## 2022-08-05 DIAGNOSIS — I10 ESSENTIAL HYPERTENSION: ICD-10-CM

## 2022-08-05 RX ORDER — BLOOD-GLUCOSE TRANSMITTER
EACH MISCELLANEOUS
Qty: 1 EACH | Refills: 1 | Status: SHIPPED | OUTPATIENT
Start: 2022-08-05

## 2022-08-05 RX ORDER — GLIPIZIDE 5 MG/1
5 TABLET ORAL
Qty: 90 TABLET | Refills: 1 | Status: SHIPPED | OUTPATIENT
Start: 2022-08-05

## 2022-08-05 RX ORDER — MONTELUKAST SODIUM 10 MG/1
10 TABLET ORAL DAILY
Qty: 90 TABLET | Refills: 2 | Status: SHIPPED | OUTPATIENT
Start: 2022-08-05

## 2022-08-05 RX ORDER — AMLODIPINE BESYLATE 10 MG/1
10 TABLET ORAL DAILY
Qty: 180 TABLET | Refills: 2 | Status: SHIPPED | OUTPATIENT
Start: 2022-08-05

## 2022-08-05 RX ORDER — LOSARTAN POTASSIUM 100 MG/1
100 TABLET ORAL DAILY
Qty: 90 TABLET | Refills: 2 | Status: SHIPPED | OUTPATIENT
Start: 2022-08-05

## 2022-08-05 RX ORDER — ATENOLOL 50 MG/1
50 TABLET ORAL DAILY
Qty: 90 TABLET | Refills: 0 | Status: SHIPPED | OUTPATIENT
Start: 2022-08-05

## 2022-08-05 RX ORDER — ESCITALOPRAM OXALATE 20 MG/1
20 TABLET ORAL DAILY
Qty: 90 TABLET | Refills: 0 | Status: SHIPPED | OUTPATIENT
Start: 2022-08-05

## 2022-08-05 RX ORDER — ATORVASTATIN CALCIUM 20 MG/1
20 TABLET, FILM COATED ORAL DAILY
Qty: 90 TABLET | Refills: 1 | Status: SHIPPED | OUTPATIENT
Start: 2022-08-05

## 2022-08-05 RX ORDER — BLOOD-GLUCOSE SENSOR
EACH MISCELLANEOUS
Qty: 9 EACH | Refills: 2 | Status: SHIPPED | OUTPATIENT
Start: 2022-08-05

## 2022-08-05 RX ORDER — PANTOPRAZOLE SODIUM 40 MG/1
40 TABLET, DELAYED RELEASE ORAL DAILY
Qty: 90 TABLET | Refills: 2 | Status: SHIPPED | OUTPATIENT
Start: 2022-08-05

## 2022-08-08 ENCOUNTER — VIRTUAL VISIT (OUTPATIENT)
Dept: FAMILY MEDICINE CLINIC | Age: 42
End: 2022-08-08
Payer: COMMERCIAL

## 2022-08-08 DIAGNOSIS — F43.10 PTSD (POST-TRAUMATIC STRESS DISORDER): ICD-10-CM

## 2022-08-08 DIAGNOSIS — H18.603 KERATOCONUS OF BOTH EYES: ICD-10-CM

## 2022-08-08 DIAGNOSIS — Z86.73 HISTORY OF TIA (TRANSIENT ISCHEMIC ATTACK): ICD-10-CM

## 2022-08-08 DIAGNOSIS — F33.1 MODERATE EPISODE OF RECURRENT MAJOR DEPRESSIVE DISORDER (HCC): Primary | ICD-10-CM

## 2022-08-08 DIAGNOSIS — I10 PRIMARY HYPERTENSION: ICD-10-CM

## 2022-08-08 DIAGNOSIS — H18.20 CORNEAL EDEMA OF RIGHT EYE: ICD-10-CM

## 2022-08-08 DIAGNOSIS — E66.01 MORBID OBESITY WITH BMI OF 60.0-69.9, ADULT (HCC): ICD-10-CM

## 2022-08-08 DIAGNOSIS — J45.50 SEVERE PERSISTENT ASTHMA WITHOUT COMPLICATION: ICD-10-CM

## 2022-08-08 PROCEDURE — 99214 OFFICE O/P EST MOD 30 MIN: CPT | Performed by: FAMILY MEDICINE

## 2022-08-08 RX ORDER — BUPROPION HYDROCHLORIDE 150 MG/1
300 TABLET ORAL DAILY
Qty: 60 TABLET | Refills: 2 | Status: SHIPPED | OUTPATIENT
Start: 2022-08-08

## 2022-08-08 RX ORDER — MIRTAZAPINE 15 MG/1
15 TABLET, FILM COATED ORAL
Qty: 30 TABLET | Refills: 0 | Status: SHIPPED | OUTPATIENT
Start: 2022-08-08 | End: 2022-08-25 | Stop reason: SDUPTHER

## 2022-08-08 RX ORDER — ACETAMINOPHEN AND CODEINE PHOSPHATE 300; 30 MG/1; MG/1
1 TABLET ORAL
Qty: 28 TABLET | Refills: 0 | Status: SHIPPED | OUTPATIENT
Start: 2022-08-08 | End: 2022-08-15

## 2022-08-08 NOTE — PROGRESS NOTES
Chief Complaint   Patient presents with    Depression     Follow-up    1. Have you been to the ER, urgent care clinic since your last visit? Hospitalized since your last visit? No    2. Have you seen or consulted any other health care providers outside of the [address] [place] [address] since your last visit? Include any pap smears or colon screening.  No     Discuss continued eye pain
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
## 2022-08-09 ENCOUNTER — TELEPHONE (OUTPATIENT)
Dept: FAMILY MEDICINE CLINIC | Age: 42
End: 2022-08-09

## 2022-08-09 NOTE — TELEPHONE ENCOUNTER
----- Message from [facility] sent at 7/20/2022  9:29 AM EDT -----  Subject: Message to Provider    QUESTIONS  Information for Provider? Home Health would like to know if clinical   received paperwork through fax   ---------------------------------------------------------------------------  --------------  [address]  [phone]; OK to leave message on voicemail  ---------------------------------------------------------------------------  --------------  SCRIPT ANSWERS  Relationship to Patient? Third Party  Third Party Type? Nursing Home?    Representative Name? Pantera Odonnell

## 2022-08-16 ENCOUNTER — TELEPHONE (OUTPATIENT)
Dept: FAMILY MEDICINE CLINIC | Age: 42
End: 2022-08-16

## 2022-08-16 NOTE — TELEPHONE ENCOUNTER
----- Message from Denihayder Arandasylvain sent at 8/16/2022 10:26 AM EDT -----  Subject: Message to Provider    QUESTIONS  Information for Provider? third party from home health care wants to   verify that the doctor received the home health orders she sent out in   july. Ellie Barrett- [phone]  ---------------------------------------------------------------------------  --------------  Emma SOW  [phone]; OK to leave message on voicemail  ---------------------------------------------------------------------------  --------------  SCRIPT ANSWERS  Relationship to Patient? Third Party  Third Party Type? Home Health Care?    Representative Name? Sunny Lassiter

## 2022-08-25 ENCOUNTER — VIRTUAL VISIT (OUTPATIENT)
Dept: FAMILY MEDICINE CLINIC | Age: 42
End: 2022-08-25
Payer: COMMERCIAL

## 2022-08-25 DIAGNOSIS — J45.50 SEVERE PERSISTENT ASTHMA WITHOUT COMPLICATION: ICD-10-CM

## 2022-08-25 DIAGNOSIS — F43.10 PTSD (POST-TRAUMATIC STRESS DISORDER): ICD-10-CM

## 2022-08-25 DIAGNOSIS — F33.1 MODERATE EPISODE OF RECURRENT MAJOR DEPRESSIVE DISORDER (HCC): Primary | ICD-10-CM

## 2022-08-25 DIAGNOSIS — I10 PRIMARY HYPERTENSION: ICD-10-CM

## 2022-08-25 DIAGNOSIS — Z86.73 HISTORY OF TIA (TRANSIENT ISCHEMIC ATTACK): ICD-10-CM

## 2022-08-25 DIAGNOSIS — E66.01 MORBID OBESITY WITH BMI OF 60.0-69.9, ADULT (HCC): ICD-10-CM

## 2022-08-25 PROCEDURE — 99213 OFFICE O/P EST LOW 20 MIN: CPT | Performed by: FAMILY MEDICINE

## 2022-08-25 RX ORDER — MIRTAZAPINE 15 MG/1
15 TABLET, FILM COATED ORAL
Qty: 90 TABLET | Refills: 0 | Status: SHIPPED | OUTPATIENT
Start: 2022-08-25

## 2022-08-25 NOTE — PROGRESS NOTES
Chief Complaint   Patient presents with    Follow-up    1. Have you been to the ER, urgent care clinic since your last visit? Hospitalized since your last visit? No    2. Have you seen or consulted any other health care providers outside of the [address] [place] [address] since your last visit? Include any pap smears or colon screening.  No
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
## 2022-09-09 ENCOUNTER — VIRTUAL VISIT (OUTPATIENT)
Dept: FAMILY MEDICINE CLINIC | Age: 42
End: 2022-09-09
Payer: COMMERCIAL

## 2022-09-09 DIAGNOSIS — J45.51 SEVERE PERSISTENT ASTHMA WITH ACUTE EXACERBATION: Primary | ICD-10-CM

## 2022-09-09 PROCEDURE — 99442 PR PHYS/QHP TELEPHONE EVALUATION 11-20 MIN: CPT | Performed by: FAMILY MEDICINE

## 2022-09-09 RX ORDER — DOXYCYCLINE HYCLATE 100 MG
100 TABLET ORAL 2 TIMES DAILY
Qty: 14 TABLET | Refills: 0 | Status: SHIPPED | OUTPATIENT
Start: 2022-09-09 | End: 2022-09-10 | Stop reason: SDUPTHER

## 2022-09-09 RX ORDER — PREDNISONE 20 MG/1
TABLET ORAL
Qty: 13 TABLET | Refills: 0 | Status: SHIPPED | OUTPATIENT
Start: 2022-09-09

## 2022-09-09 NOTE — PROGRESS NOTES
Cielo Sterling is a 38 y.o. female who was seen by synchronous (real-time) audio-video technology on 9/9/2022 for URI      Assessment/ Plan: Prednisone for asthma flare and rec use of just nebs (pulmicort and duonebs) BID and Duoneb PRN for next few days. If not improving/worsening sx, to ER. Also discussed following up with Pulm. Diagnoses and all orders for this visit:    1. Severe persistent asthma with acute exacerbation  -     predniSONE (DELTASONE) 20 mg tablet; Take 3 pills for 3 days, then 2 pills for 2 days  -     doxycycline (VIBRA-TABS) 100 mg tablet; Take 1 Tablet by mouth two (2) times a day for 7 days. I spent at least 11 minutes on this visit with this established patient. Follow-up and Dispositions    Return if symptoms worsen or fail to improve. Subjective:     URI - hx of severe asthma. neg covid test.  Sx x 5 days ago and worse yesterday. Has dark green foul smelling productive cough. Having sig dyspnea, coughing, and wheezing. Took Prednisone 30 mg and not improving much. Using Brestri and nebs recently. ROS per HPI    Past Medical History:   Diagnosis Date    Alcohol abuse     Anxiety and depression     Asthma     DM (diabetes mellitus), type 2 ([address]     Morbid obesity ([facility] [address] 10/28/2014    Stroke [facility])      Past Surgical History:   Procedure Laterality Date    HX GYN      3 C-sections    HX GYN      Miscarriage        Objective:     Patient-Reported Vitals 8/8/2022   Patient-Reported Weight 285 lb   Patient-Reported Pulse 96   Patient-Reported Temperature -   Patient-Reported Systolic  672   Patient-Reported Diastolic 276      No exam d/t audio only    We discussed the expected course, resolution and complications of the diagnosis(es) in detail. Medication risks, benefits, costs, interactions, and alternatives were discussed as indicated. I advised her to contact the office if her condition worsens, changes or fails to improve as anticipated.  She expressed understanding with the diagnosis(es) and plan. [facility], was evaluated through a synchronous (real-time) audio-video encounter. The patient (or guardian if applicable) is aware that this is a billable service, which includes applicable co-pays. This Virtual Visit was conducted with patient's (and/or legal guardian's) consent. The visit was conducted pursuant to the emergency declaration under the [address] [place] [address] and the [email or web address] General Act. Patient identification was verified, and a caregiver was present when appropriate.   The patient was located at: Home: [address] [facility] [address] [id number]  The provider was located at: Home: [unfilled]        Ivone Lee MD

## 2022-09-10 ENCOUNTER — TELEPHONE (OUTPATIENT)
Dept: FAMILY MEDICINE CLINIC | Age: 42
End: 2022-09-10

## 2022-09-10 DIAGNOSIS — J45.51 SEVERE PERSISTENT ASTHMA WITH ACUTE EXACERBATION: Primary | ICD-10-CM

## 2022-09-10 RX ORDER — DOXYCYCLINE HYCLATE 100 MG
100 TABLET ORAL 2 TIMES DAILY
Qty: 14 TABLET | Refills: 0 | Status: SHIPPED | OUTPATIENT
Start: 2022-09-10 | End: 2022-09-17

## 2022-09-16 ENCOUNTER — HOSPITAL ENCOUNTER (OUTPATIENT)
Dept: CT IMAGING | Age: 42
Discharge: HOME | End: 2022-09-16
Attending: STUDENT IN AN ORGANIZED HEALTH CARE EDUCATION/TRAINING PROGRAM
Payer: COMMERCIAL

## 2022-09-16 VITALS — DIASTOLIC BLOOD PRESSURE: 89 MMHG | SYSTOLIC BLOOD PRESSURE: 176 MMHG | HEART RATE: 70 BPM

## 2022-09-16 DIAGNOSIS — R06.02 SOB (SHORTNESS OF BREATH): ICD-10-CM

## 2022-09-16 DIAGNOSIS — E11.65 TYPE 2 DIABETES MELLITUS WITH HYPERGLYCEMIA, WITH LONG-TERM CURRENT USE OF INSULIN (HCC): ICD-10-CM

## 2022-09-16 DIAGNOSIS — Z79.4 TYPE 2 DIABETES MELLITUS WITH HYPERGLYCEMIA, WITH LONG-TERM CURRENT USE OF INSULIN (HCC): ICD-10-CM

## 2022-09-16 DIAGNOSIS — E66.01 MORBID OBESITY (HCC): ICD-10-CM

## 2022-09-16 DIAGNOSIS — I10 PRIMARY HYPERTENSION: ICD-10-CM

## 2022-09-16 PROCEDURE — 74011000636 HC RX REV CODE- 636: Performed by: STUDENT IN AN ORGANIZED HEALTH CARE EDUCATION/TRAINING PROGRAM

## 2022-09-16 PROCEDURE — 74011250637 HC RX REV CODE- 250/637: Performed by: STUDENT IN AN ORGANIZED HEALTH CARE EDUCATION/TRAINING PROGRAM

## 2022-09-16 PROCEDURE — 75574 CT ANGIO HRT W/3D IMAGE: CPT

## 2022-09-16 RX ORDER — IODIXANOL 320 MG/ML
100 INJECTION, SOLUTION INTRAVASCULAR ONCE
Status: DISCONTINUED | OUTPATIENT
Start: 2022-09-16 | End: 2022-09-16 | Stop reason: CLARIF

## 2022-09-16 RX ORDER — NITROGLYCERIN 0.4 MG/1
0.4 TABLET SUBLINGUAL AS NEEDED
Status: DISCONTINUED | OUTPATIENT
Start: 2022-09-16 | End: 2022-09-20 | Stop reason: HOSPADM

## 2022-09-16 RX ADMIN — IODIXANOL 100 ML: 320 INJECTION, SOLUTION INTRAVASCULAR at 12:38

## 2022-09-16 RX ADMIN — NITROGLYCERIN 0.4 MG: 0.4 TABLET SUBLINGUAL at 12:05

## 2022-09-16 NOTE — PROGRESS NOTES
1155- Pt to CT room. IV SL established by Usha Crawley RT. Pt tolerated well. Pt took additional beta blocker PTA. HR- 68    1205- NTG given. CT scan begins. 1208- IV patent. No complaints. HR- 68    1210- CT scan complete. Pt tolerated procedure well. 1215- IV D/C'd intact without problem. D/C instructions reviewed with pt and copy given. Verbalized understanding. D/C'd amb, stable, NAD with mother.

## 2022-10-06 ENCOUNTER — APPOINTMENT (OUTPATIENT)
Dept: GENERAL RADIOLOGY | Age: 42
End: 2022-10-06
Attending: EMERGENCY MEDICINE
Payer: MEDICAID

## 2022-10-06 ENCOUNTER — HOSPITAL ENCOUNTER (EMERGENCY)
Age: 42
Discharge: HOME | End: 2022-10-06
Attending: EMERGENCY MEDICINE
Payer: MEDICAID

## 2022-10-06 VITALS
DIASTOLIC BLOOD PRESSURE: 75 MMHG | BODY MASS INDEX: 48.82 KG/M2 | TEMPERATURE: 98.8 F | WEIGHT: 293 LBS | RESPIRATION RATE: 19 BRPM | HEART RATE: 82 BPM | OXYGEN SATURATION: 96 % | SYSTOLIC BLOOD PRESSURE: 125 MMHG | HEIGHT: 65 IN

## 2022-10-06 DIAGNOSIS — J45.909 MODERATE ASTHMA WITHOUT COMPLICATION, UNSPECIFIED WHETHER PERSISTENT: Primary | ICD-10-CM

## 2022-10-06 PROCEDURE — 94640 AIRWAY INHALATION TREATMENT: CPT

## 2022-10-06 PROCEDURE — 74011250636 HC RX REV CODE- 250/636: Performed by: EMERGENCY MEDICINE

## 2022-10-06 PROCEDURE — 74011000250 HC RX REV CODE- 250: Performed by: EMERGENCY MEDICINE

## 2022-10-06 PROCEDURE — 71045 X-RAY EXAM CHEST 1 VIEW: CPT

## 2022-10-06 PROCEDURE — 99284 EMERGENCY DEPT VISIT MOD MDM: CPT

## 2022-10-06 PROCEDURE — 96372 THER/PROPH/DIAG INJ SC/IM: CPT

## 2022-10-06 RX ORDER — PREDNISONE 10 MG/1
TABLET ORAL
Qty: 39 TABLET | Refills: 0 | Status: SHIPPED | OUTPATIENT
Start: 2022-10-06

## 2022-10-06 RX ORDER — DEXAMETHASONE SODIUM PHOSPHATE 100 MG/10ML
10 INJECTION INTRAMUSCULAR; INTRAVENOUS
Status: COMPLETED | OUTPATIENT
Start: 2022-10-06 | End: 2022-10-06

## 2022-10-06 RX ORDER — ALBUTEROL SULFATE 0.83 MG/ML
5 SOLUTION RESPIRATORY (INHALATION)
Status: COMPLETED | OUTPATIENT
Start: 2022-10-06 | End: 2022-10-06

## 2022-10-06 RX ADMIN — DEXAMETHASONE SODIUM PHOSPHATE 10 MG: 10 INJECTION INTRAMUSCULAR; INTRAVENOUS at 11:32

## 2022-10-06 RX ADMIN — ALBUTEROL SULFATE 5 MG: 2.5 SOLUTION RESPIRATORY (INHALATION) at 11:30

## 2022-10-06 NOTE — ED PROVIDER NOTES
46-year-old female with history of asthma and now shortness of breath. She states this is typical of her asthma exacerbations. She is not had to be intubated or ICU admissions previously. She stable in the emergency department and not hypoxic. The history is provided by the patient and a relative. Shortness of Breath  This is a chronic problem. The problem occurs frequently. The current episode started more than 1 week ago. The problem has not changed since onset. Pertinent negatives include no fever, no headaches, no coryza, no rhinorrhea, no sore throat, no ear pain, no neck pain, no cough, no sputum production, no PND, no orthopnea, no chest pain, no syncope, no vomiting, no abdominal pain, no rash and no leg pain. She has tried nothing for the symptoms. The treatment provided mild relief. She has had Prior hospitalizations. She has had Prior ED visits. Associated medical issues include asthma. Associated medical issues do not include COPD, pneumonia, chronic lung disease, PE or CAD.       Past Medical History:   Diagnosis Date    Alcohol abuse     Anxiety and depression     Asthma     DM (diabetes mellitus), type 2 ([facility] [address]     Morbid obesity ([facility] [address] 10/28/2014    Stroke [facility])        Past Surgical History:   Procedure Laterality Date    HX GYN      3 C-sections    HX GYN      Miscarriage         Family History:   Problem Relation Age of Onset    Hypertension Maternal Aunt     Hypertension Maternal Uncle     Thyroid Disease Maternal Uncle     Hypertension Maternal Grandmother     Cancer Maternal Grandfather     Hypertension Maternal Grandfather     Diabetes Paternal Grandmother     Cancer Paternal Grandmother     Hypertension Mother     Alcohol abuse Father     Substance Abuse Father     Hypertension Maternal Aunt     Hypertension Maternal Uncle     Stroke Maternal Uncle     Hypertension Maternal Uncle     Alcohol abuse Maternal Uncle     Hypertension Maternal Uncle     Hypertension Maternal Uncle     Arrhythmia Maternal Uncle        Social History     Socioeconomic History    Marital status:      Spouse name: Not on file    Number of children: Not on file    Years of education: Not on file    Highest education level: Not on file   Occupational History    Not on file   Tobacco Use    Smoking status: Former     Packs/day: 1.00     Types: Cigarettes     Start date: 2014     Quit date: 2016     Years since quittin.7    Smokeless tobacco: Never   Vaping Use    Vaping Use: Never used   Substance and Sexual Activity    Alcohol use: Yes     Alcohol/week: 0.0 standard drinks     Comment: daily    Drug use: No    Sexual activity: Yes     Partners: Male   Other Topics Concern    Not on file   Social History Narrative    Not on file     Social Determinants of Health     Financial Resource Strain: High Risk    Difficulty of Paying Living Expenses: Hard   Food Insecurity: Food Insecurity Present    Worried About Running Out of Food in the Last Year: Sometimes true    Ran Out of Food in the Last Year: Sometimes true   Transportation Needs: Not on file   Physical Activity: Not on file   Stress: Not on file   Social Connections: Not on file   Intimate Partner Violence: Not on file   Housing Stability: Not on file         ALLERGIES: Metformin and Pcn [penicillins]    Review of Systems   Constitutional:  Negative for chills and fever. HENT:  Negative for congestion, ear pain, rhinorrhea, sneezing and sore throat. Respiratory:  Positive for shortness of breath. Negative for cough and sputum production. Cardiovascular:  Negative for chest pain, orthopnea, syncope and PND. Gastrointestinal:  Negative for abdominal pain, nausea and vomiting. Musculoskeletal:  Negative for back pain, myalgias, neck pain and neck stiffness. Skin:  Negative for rash. Neurological:  Negative for dizziness, weakness and headaches.    All other systems reviewed and are negative. Vitals:    10/06/22 0959 10/06/22 1008 10/06/22 1014   BP: (!) 148/98  136/72   Pulse: 88  90   Resp: 18  15   Temp: 98.8 °F (37.1 °C)     SpO2: 93% 96% 95%   Weight: 146.5 kg (323 lb)     Height: 5' 5\" (1.651 m)              Physical Exam  Vitals and nursing note reviewed. Constitutional:       Appearance: Normal appearance. She is well-developed. HENT:      Head: Normocephalic and atraumatic. Eyes:      Conjunctiva/sclera: Conjunctivae normal.   Cardiovascular:      Rate and Rhythm: Normal rate and regular rhythm. Pulses: Normal pulses. Heart sounds: Normal heart sounds, S1 normal and S2 normal.   Pulmonary:      Effort: Pulmonary effort is normal. No respiratory distress. Breath sounds: Examination of the right-upper field reveals wheezing. Examination of the left-upper field reveals wheezing. Examination of the right-middle field reveals wheezing. Examination of the left-middle field reveals wheezing. Wheezing present. Abdominal:      General: Bowel sounds are normal. There is no distension. Palpations: Abdomen is soft. Tenderness: There is no abdominal tenderness. There is no rebound. Musculoskeletal:         General: Normal range of motion. Cervical back: Full passive range of motion without pain, normal range of motion and neck supple. Skin:     General: Skin is warm and dry. Findings: No rash. Neurological:      Mental Status: She is alert and oriented to person, place, and time. Psychiatric:         Speech: Speech normal.         Behavior: Behavior normal.         Thought Content: Thought content normal.         Judgment: Judgment normal.        MDM  Number of Diagnoses or Management Options  Moderate asthma without complication, unspecified whether persistent  Diagnosis management comments: Asthma versus pneumonia      Chest x-ray is negative and patient is improved with steroids and albuterol.   She is suitable for discharge to home.    Procedures

## 2022-10-27 ENCOUNTER — HOSPITAL ENCOUNTER (EMERGENCY)
Age: 42
Discharge: HOME | End: 2022-10-27
Attending: STUDENT IN AN ORGANIZED HEALTH CARE EDUCATION/TRAINING PROGRAM
Payer: MEDICAID

## 2022-10-27 VITALS
RESPIRATION RATE: 25 BRPM | DIASTOLIC BLOOD PRESSURE: 73 MMHG | SYSTOLIC BLOOD PRESSURE: 139 MMHG | TEMPERATURE: 98.6 F | OXYGEN SATURATION: 94 % | HEIGHT: 65 IN | WEIGHT: 293 LBS | HEART RATE: 103 BPM | BODY MASS INDEX: 48.82 KG/M2

## 2022-10-27 DIAGNOSIS — J45.51 SEVERE PERSISTENT ASTHMA WITH ACUTE EXACERBATION: Primary | ICD-10-CM

## 2022-10-27 LAB
ALBUMIN SERPL-MCNC: 4 G/DL (ref 3.5–5.2)
ALBUMIN/GLOB SERPL: 1.5 {RATIO} (ref 1.1–2.2)
ALP SERPL-CCNC: 130 U/L (ref 35–104)
ALT SERPL-CCNC: 31 U/L (ref 10–35)
ANION GAP SERPL CALC-SCNC: 13 MMOL/L (ref 5–15)
AST SERPL-CCNC: 22 U/L (ref 10–35)
BASOPHILS # BLD: 0 K/UL (ref 0–0.1)
BASOPHILS NFR BLD: 0 % (ref 0–1)
BILIRUB SERPL-MCNC: 0.4 MG/DL (ref 0.2–1)
BUN SERPL-MCNC: 8 MG/DL (ref 6–20)
BUN/CREAT SERPL: 13 (ref 12–20)
CALCIUM SERPL-MCNC: 9 MG/DL (ref 8.6–10)
CHLORIDE SERPL-SCNC: 103 MMOL/L (ref 98–107)
CO2 SERPL-SCNC: 25 MMOL/L (ref 22–29)
CREAT SERPL-MCNC: 0.6 MG/DL (ref 0.5–0.9)
DIFFERENTIAL METHOD BLD: NORMAL
EOSINOPHIL # BLD: 0.2 K/UL (ref 0–0.4)
EOSINOPHIL NFR BLD: 2 % (ref 0–7)
ERYTHROCYTE [DISTWIDTH] IN BLOOD BY AUTOMATED COUNT: 14.1 % (ref 11.5–14.5)
GLOBULIN SER CALC-MCNC: 2.7 G/DL (ref 2–4)
GLUCOSE SERPL-MCNC: 269 MG/DL (ref 65–100)
HCT VFR BLD AUTO: 37.2 % (ref 35–47)
HGB BLD-MCNC: 12.5 G/DL (ref 11.5–16)
IMM GRANULOCYTES # BLD AUTO: 0 K/UL (ref 0–0.04)
IMM GRANULOCYTES NFR BLD AUTO: 0 % (ref 0–0.5)
LYMPHOCYTES # BLD: 3.1 K/UL (ref 0.8–3.5)
LYMPHOCYTES NFR BLD: 34 % (ref 12–49)
MAGNESIUM SERPL-MCNC: 1.7 MG/DL (ref 1.6–2.6)
MCH RBC QN AUTO: 29.4 PG (ref 26–34)
MCHC RBC AUTO-ENTMCNC: 33.6 G/DL (ref 30–36.5)
MCV RBC AUTO: 87.5 FL (ref 80–99)
MONOCYTES # BLD: 0.6 K/UL (ref 0–1)
MONOCYTES NFR BLD: 6 % (ref 5–13)
NEUTS SEG # BLD: 5.2 K/UL (ref 1.8–8)
NEUTS SEG NFR BLD: 57 % (ref 32–75)
NRBC # BLD: 0 K/UL (ref 0–0.01)
NRBC BLD-RTO: 0 PER 100 WBC
PLATELET # BLD AUTO: 275 K/UL (ref 150–400)
PMV BLD AUTO: 10.1 FL (ref 8.9–12.9)
POTASSIUM SERPL-SCNC: 3.9 MMOL/L (ref 3.5–5.1)
PROT SERPL-MCNC: 6.7 G/DL (ref 6.4–8.3)
RBC # BLD AUTO: 4.25 M/UL (ref 3.8–5.2)
SODIUM SERPL-SCNC: 141 MMOL/L (ref 136–145)
WBC # BLD AUTO: 9.1 K/UL (ref 3.6–11)

## 2022-10-27 PROCEDURE — 94640 AIRWAY INHALATION TREATMENT: CPT

## 2022-10-27 PROCEDURE — 80053 COMPREHEN METABOLIC PANEL: CPT

## 2022-10-27 PROCEDURE — 74011000250 HC RX REV CODE- 250: Performed by: STUDENT IN AN ORGANIZED HEALTH CARE EDUCATION/TRAINING PROGRAM

## 2022-10-27 PROCEDURE — 74011250636 HC RX REV CODE- 250/636: Performed by: STUDENT IN AN ORGANIZED HEALTH CARE EDUCATION/TRAINING PROGRAM

## 2022-10-27 PROCEDURE — 96375 TX/PRO/DX INJ NEW DRUG ADDON: CPT

## 2022-10-27 PROCEDURE — 85025 COMPLETE CBC W/AUTO DIFF WBC: CPT

## 2022-10-27 PROCEDURE — 99284 EMERGENCY DEPT VISIT MOD MDM: CPT

## 2022-10-27 PROCEDURE — 83735 ASSAY OF MAGNESIUM: CPT

## 2022-10-27 PROCEDURE — 36415 COLL VENOUS BLD VENIPUNCTURE: CPT

## 2022-10-27 PROCEDURE — 96365 THER/PROPH/DIAG IV INF INIT: CPT

## 2022-10-27 RX ORDER — IPRATROPIUM BROMIDE AND ALBUTEROL SULFATE 2.5; .5 MG/3ML; MG/3ML
3 SOLUTION RESPIRATORY (INHALATION)
Status: COMPLETED | OUTPATIENT
Start: 2022-10-27 | End: 2022-10-27

## 2022-10-27 RX ORDER — MAGNESIUM SULFATE HEPTAHYDRATE 40 MG/ML
2 INJECTION, SOLUTION INTRAVENOUS ONCE
Status: COMPLETED | OUTPATIENT
Start: 2022-10-27 | End: 2022-10-27

## 2022-10-27 RX ORDER — HYDROCORTISONE SODIUM SUCCINATE 100 MG/2ML
50 INJECTION, POWDER, FOR SOLUTION INTRAMUSCULAR; INTRAVENOUS ONCE
Status: COMPLETED | OUTPATIENT
Start: 2022-10-27 | End: 2022-10-27

## 2022-10-27 RX ORDER — PREDNISONE 20 MG/1
60 TABLET ORAL DAILY
Qty: 15 TABLET | Refills: 0 | Status: SHIPPED | OUTPATIENT
Start: 2022-10-27 | End: 2022-11-01

## 2022-10-27 RX ADMIN — IPRATROPIUM BROMIDE AND ALBUTEROL SULFATE 3 ML: .5; 3 SOLUTION RESPIRATORY (INHALATION) at 08:47

## 2022-10-27 RX ADMIN — MAGNESIUM SULFATE HEPTAHYDRATE 2 G: 40 INJECTION, SOLUTION INTRAVENOUS at 08:46

## 2022-10-27 RX ADMIN — IPRATROPIUM BROMIDE AND ALBUTEROL SULFATE 3 ML: .5; 3 SOLUTION RESPIRATORY (INHALATION) at 09:57

## 2022-10-27 RX ADMIN — HYDROCORTISONE SODIUM SUCCINATE 50 MG: 100 INJECTION, POWDER, FOR SOLUTION INTRAMUSCULAR; INTRAVENOUS at 08:44

## 2022-10-27 RX ADMIN — IPRATROPIUM BROMIDE AND ALBUTEROL SULFATE 3 ML: .5; 3 SOLUTION RESPIRATORY (INHALATION) at 09:17

## 2022-10-27 NOTE — Clinical Note
[address]  [facility] EMERGENCY DEPT  [address] [facility] [address]  [place] [address] [phone]    Work/School Note    Date: 10/27/2022    To Whom It May concern:      Sumi Allison was seen and treated today in the emergency room by the following provider(s):  Attending Provider: Carmen Mark MD.      Sumi Allison is excused from work/school on 10/27/22. She is clear to return to work/school on 10/28/22. Sincerely,          Ricardo Hanley MD

## 2022-10-27 NOTE — DISCHARGE INSTRUCTIONS
You presented to ED with an acute  asthma exacerbation unresponsive to home treatments. You have severe persistent asthma and take multiple medications. Symptoms here improved with magnesium IV, 3 duo nebs, stress dose steroids. As you are feeling better you feel comfortable going home at this time. If symptoms change or worsen return to the ED for further evaluation. Please follow-up closely with your pulmonologist.  I recommend taking the prescribed prednisone 60 mg daily starting tonight. Further dosing of steroids will be driven by your pulmonary team.  Additionally take your albuterol inhaler 4 puffs every 4 hours for the next 24 hours.

## 2022-10-27 NOTE — ED TRIAGE NOTES
Pt arrives in the ED with complaints of wheezing and SOB x 2 weeks worsening yesterday. Reports five breathing treatments between las tnight and this morning without relief.

## 2022-10-27 NOTE — ED PROVIDER NOTES
Patient is a 81-year-old female with severe persistent asthma on multiple home treatments who has been taking home nebulizer treatments as well as steroids without improvement. Patient has never required ablation but has required hospitalization in the past.  Patient has mild respiratory distress with wheezing and decreased breath sounds. The history is provided by the patient and medical records. Shortness of Breath  This is a chronic problem. The problem occurs continuously. The current episode started more than 1 week ago. The problem has been rapidly worsening. Associated symptoms include cough and wheezing. The problem's precipitants include weather/humidity (URI symptoms). She has tried ipratropium inhalers, beta-agonist inhalers, inhaled steroids and oral steroids for the symptoms. The treatment provided mild relief. She has had Prior hospitalizations. She has had Prior ED visits. Associated medical issues include asthma.       Past Medical History:   Diagnosis Date    Alcohol abuse     Anxiety and depression     Asthma     DM (diabetes mellitus), type 2 ([facility] [address]     Morbid obesity ([facility] [address] 10/28/2014    Stroke [facility])        Past Surgical History:   Procedure Laterality Date    HX GYN      3 C-sections    HX GYN      Miscarriage         Family History:   Problem Relation Age of Onset    Hypertension Maternal Aunt     Hypertension Maternal Uncle     Thyroid Disease Maternal Uncle     Hypertension Maternal Grandmother     Cancer Maternal Grandfather     Hypertension Maternal Grandfather     Diabetes Paternal Grandmother     Cancer Paternal Grandmother     Hypertension Mother     Alcohol abuse Father     Substance Abuse Father     Hypertension Maternal Aunt     Hypertension Maternal Uncle     Stroke Maternal Uncle     Hypertension Maternal Uncle     Alcohol abuse Maternal Uncle     Hypertension Maternal Uncle     Hypertension Maternal Uncle     Arrhythmia Maternal Uncle        Social History     Socioeconomic History    Marital status:      Spouse name: Not on file    Number of children: Not on file    Years of education: Not on file    Highest education level: Not on file   Occupational History    Not on file   Tobacco Use    Smoking status: Former     Packs/day: 1.00     Types: Cigarettes     Start date: 2014     Quit date: 2016     Years since quittin.8    Smokeless tobacco: Never   Vaping Use    Vaping Use: Never used   Substance and Sexual Activity    Alcohol use: Yes     Alcohol/week: 0.0 standard drinks     Comment: daily    Drug use: No    Sexual activity: Yes     Partners: Male   Other Topics Concern    Not on file   Social History Narrative    Not on file     Social Determinants of Health     Financial Resource Strain: High Risk    Difficulty of Paying Living Expenses: Hard   Food Insecurity: Food Insecurity Present    Worried About Running Out of Food in the Last Year: Sometimes true    Ran Out of Food in the Last Year: Sometimes true   Transportation Needs: Not on file   Physical Activity: Not on file   Stress: Not on file   Social Connections: Not on file   Intimate Partner Violence: Not on file   Housing Stability: Not on file         ALLERGIES: Metformin and Pcn [penicillins]    Review of Systems   Constitutional:  Positive for activity change, appetite change and fatigue. HENT: Negative. Eyes: Negative. Respiratory:  Positive for cough, shortness of breath and wheezing. Cardiovascular: Negative. Gastrointestinal: Negative. Endocrine: Negative. Genitourinary: Negative. Musculoskeletal: Negative. Skin: Negative. Allergic/Immunologic: Negative. Neurological: Negative. Hematological: Negative. Psychiatric/Behavioral: Negative.        Vitals:    10/27/22 1030 10/27/22 1045 10/27/22 1100 10/27/22 1115   BP: (!) 144/73 (!) 154/70 (!) 143/67 139/73   Pulse: 96 97 (!) 105 (!) 103   Resp:    Temp:       SpO2: 94% 94% 93% 94%   Weight: Height:                Physical Exam  Vitals and nursing note reviewed. Constitutional:       General: She is not in acute distress. Appearance: Normal appearance. HENT:      Head: Normocephalic and atraumatic. Right Ear: External ear normal.      Left Ear: External ear normal.      Nose: Nose normal.   Eyes:      Extraocular Movements: Extraocular movements intact. Conjunctiva/sclera: Conjunctivae normal.   Cardiovascular:      Rate and Rhythm: Normal rate. Pulses: Normal pulses. Radial pulses are 2+ on the right side and 2+ on the left side. Heart sounds: Normal heart sounds. Pulmonary:      Effort: Tachypnea and respiratory distress present. Breath sounds: Decreased breath sounds and wheezing present. Chest:      Chest wall: No deformity or tenderness. Abdominal:      General: Abdomen is flat. There is no distension. Tenderness: There is no abdominal tenderness. Musculoskeletal:         General: No deformity or signs of injury. Normal range of motion. Cervical back: Normal range of motion and neck supple. No tenderness. Skin:     General: Skin is warm and dry. Capillary Refill: Capillary refill takes less than 2 seconds. Neurological:      General: No focal deficit present. Mental Status: She is alert and oriented to person, place, and time. Psychiatric:         Attention and Perception: Attention normal.         Mood and Affect: Mood normal.         Behavior: Behavior normal.        MDM         LABORATORY RESULTS:  Labs Reviewed   METABOLIC PANEL, COMPREHENSIVE - Abnormal; Notable for the following components:       Result Value    Glucose 269 (*)     Alk.  phosphatase 130 (*)     All other components within normal limits   CBC WITH AUTOMATED DIFF   MAGNESIUM       IMAGING RESULTS:  No orders to display       MEDICATIONS GIVEN:  Medications   albuterol-ipratropium (DUO-NEB) 2.5 MG-0.5 MG/3 ML (3 mL Nebulization Given 10/27/22 0957) magnesium sulfate 2 g/50 ml IVPB (premix or compounded) (0 g IntraVENous IV Completed 10/27/22 0910)   hydrocortisone Sod Succ (PF) (SOLU-CORTEF) injection 50 mg (50 mg IntraVENous Given 10/27/22 6459)       Differential diagnosis: Severe persistent asthma, acute exacerbation, upper respiratory infection    ED physician interpretation of laboratory results: Lab work without dehydration. Elevated glucose without DKA. MDM: Patient is a 82-year-old female with a history of asthma presented the ED with significant asthma exacerbation. Patient given stress dose steroids due to usual daily steroid dosing, magnesium as well as DuoNeb. Patient reassessed multiple times and improving after treatment. Patient feels comfortable going home. Patient will follow-up closely with her pulmonologist.    Further personalized recommendations for outpatient care as below. Key discharge instructions and summary of care: You presented to ED with an acute  asthma exacerbation unresponsive to home treatments. You have severe persistent asthma and take multiple medications. Symptoms here improved with magnesium IV, 3 duo nebs, stress dose steroids. As you are feeling better you feel comfortable going home at this time. If symptoms change or worsen return to the ED for further evaluation. Please follow-up closely with your pulmonologist.  I recommend taking the prescribed prednisone 60 mg daily starting tonight. Further dosing of steroids will be driven by your pulmonary team.  Additionally take your albuterol inhaler 4 puffs every 4 hours for the next 24 hours. The patient has been re-evaluated and feeling better. Patient is stable for discharge. All available radiology and laboratory results have been reviewed with patient and/or available family.   Patient and/or family verbally conveyed their understanding and agreement of the patient's signs, symptoms, diagnosis, treatment and prognosis and additionally agree to follow-up as recommended in the discharge instructions or to return to the Emergency Department should their condition change or worsen prior to their follow-up appointment. All questions have been answered and patient and/or available family express understanding. IMPRESSION:  1. Severe persistent asthma with acute exacerbation        DISPOSITION: Discharged     Collins Carter MD      Procedures

## 2022-11-06 ENCOUNTER — APPOINTMENT (OUTPATIENT)
Dept: GENERAL RADIOLOGY | Age: 42
End: 2022-11-06
Attending: STUDENT IN AN ORGANIZED HEALTH CARE EDUCATION/TRAINING PROGRAM
Payer: MEDICAID

## 2022-11-06 ENCOUNTER — HOSPITAL ENCOUNTER (EMERGENCY)
Age: 42
Discharge: HOME | End: 2022-11-07
Attending: STUDENT IN AN ORGANIZED HEALTH CARE EDUCATION/TRAINING PROGRAM
Payer: MEDICAID

## 2022-11-06 VITALS
RESPIRATION RATE: 19 BRPM | DIASTOLIC BLOOD PRESSURE: 98 MMHG | SYSTOLIC BLOOD PRESSURE: 164 MMHG | TEMPERATURE: 98 F | HEIGHT: 64 IN | HEART RATE: 87 BPM | BODY MASS INDEX: 50.02 KG/M2 | WEIGHT: 293 LBS | OXYGEN SATURATION: 97 %

## 2022-11-06 DIAGNOSIS — R73.9 HYPERGLYCEMIA: ICD-10-CM

## 2022-11-06 DIAGNOSIS — J45.21 INTERMITTENT ASTHMA WITH ACUTE EXACERBATION, UNSPECIFIED ASTHMA SEVERITY: Primary | ICD-10-CM

## 2022-11-06 DIAGNOSIS — E10.65 TYPE 1 DIABETES MELLITUS WITH HYPERGLYCEMIA (HCC): ICD-10-CM

## 2022-11-06 LAB
ALBUMIN SERPL-MCNC: 4.5 G/DL (ref 3.5–5.2)
ALBUMIN/GLOB SERPL: 1.4 {RATIO} (ref 1.1–2.2)
ALP SERPL-CCNC: 139 U/L (ref 35–104)
ALT SERPL-CCNC: 27 U/L (ref 10–35)
ANION GAP SERPL CALC-SCNC: 16 MMOL/L (ref 5–15)
APPEARANCE UR: CLEAR
AST SERPL-CCNC: 19 U/L (ref 10–35)
B-OH-BUTYR SERPL-SCNC: 0.33 MMOL/L
BACTERIA URNS QL MICRO: NEGATIVE /HPF
BASE EXCESS BLDV CALC-SCNC: 1.3 MMOL/L
BASOPHILS # BLD: 0 K/UL (ref 0–0.1)
BASOPHILS NFR BLD: 0 % (ref 0–1)
BILIRUB SERPL-MCNC: 0.2 MG/DL (ref 0.2–1)
BILIRUB UR QL: NEGATIVE
BUN SERPL-MCNC: 22 MG/DL (ref 6–20)
BUN/CREAT SERPL: 20 (ref 12–20)
CALCIUM SERPL-MCNC: 10.4 MG/DL (ref 8.6–10)
CHLORIDE SERPL-SCNC: 92 MMOL/L (ref 98–107)
CO2 SERPL-SCNC: 27 MMOL/L (ref 22–29)
COLOR UR: ABNORMAL
COMMENT, HOLDF: NORMAL
COVID-19 RAPID TEST, COVR: NOT DETECTED
CREAT SERPL-MCNC: 1.1 MG/DL (ref 0.5–0.9)
DIFFERENTIAL METHOD BLD: ABNORMAL
EOSINOPHIL # BLD: 0 K/UL (ref 0–0.4)
EOSINOPHIL NFR BLD: 0 % (ref 0–7)
EPITH CASTS URNS QL MICRO: ABNORMAL /LPF
ERYTHROCYTE [DISTWIDTH] IN BLOOD BY AUTOMATED COUNT: 13.4 % (ref 11.5–14.5)
FLUAV AG NPH QL IA: NEGATIVE
FLUBV AG NOSE QL IA: NEGATIVE
GLOBULIN SER CALC-MCNC: 3.2 G/DL (ref 2–4)
GLUCOSE BLD STRIP.AUTO-MCNC: 458 MG/DL (ref 65–117)
GLUCOSE BLD STRIP.AUTO-MCNC: 467 MG/DL (ref 65–117)
GLUCOSE SERPL-MCNC: 742 MG/DL (ref 65–100)
GLUCOSE UR STRIP.AUTO-MCNC: >1000 MG/DL
HCO3 BLDV-SCNC: 25.9 MMOL/L (ref 23–28)
HCT VFR BLD AUTO: 40.5 % (ref 35–47)
HGB BLD-MCNC: 13.4 G/DL (ref 11.5–16)
HGB UR QL STRIP: NEGATIVE
IMM GRANULOCYTES # BLD AUTO: 0.1 K/UL (ref 0–0.04)
IMM GRANULOCYTES NFR BLD AUTO: 1 % (ref 0–0.5)
KETONES UR QL STRIP.AUTO: ABNORMAL MG/DL
LEUKOCYTE ESTERASE UR QL STRIP.AUTO: NEGATIVE
LYMPHOCYTES # BLD: 4.8 K/UL (ref 0.8–3.5)
LYMPHOCYTES NFR BLD: 29 % (ref 12–49)
MCH RBC QN AUTO: 29.3 PG (ref 26–34)
MCHC RBC AUTO-ENTMCNC: 33.1 G/DL (ref 30–36.5)
MCV RBC AUTO: 88.4 FL (ref 80–99)
MONOCYTES # BLD: 1.1 K/UL (ref 0–1)
MONOCYTES NFR BLD: 7 % (ref 5–13)
NEUTS SEG # BLD: 10.7 K/UL (ref 1.8–8)
NEUTS SEG NFR BLD: 64 % (ref 32–75)
NITRITE UR QL STRIP.AUTO: NEGATIVE
NRBC # BLD: 0 K/UL (ref 0–0.01)
NRBC BLD-RTO: 0 PER 100 WBC
PCO2 BLDV: 40.3 MMHG (ref 41–51)
PH BLDV: 7.42 [PH] (ref 7.32–7.42)
PH UR STRIP: 5.5 [PH] (ref 5–8)
PLATELET # BLD AUTO: 380 K/UL (ref 150–400)
PMV BLD AUTO: 10.6 FL (ref 8.9–12.9)
PO2 BLDV: 54 MMHG (ref 25–40)
POTASSIUM SERPL-SCNC: 4.4 MMOL/L (ref 3.5–5.1)
PROT SERPL-MCNC: 7.7 G/DL (ref 6.4–8.3)
PROT UR STRIP-MCNC: NEGATIVE MG/DL
RBC # BLD AUTO: 4.58 M/UL (ref 3.8–5.2)
RBC #/AREA URNS HPF: ABNORMAL /HPF
SAMPLES BEING HELD,HOLD: NORMAL
SAO2 % BLDV: 88 % (ref 65–88)
SERVICE CMNT-IMP: ABNORMAL
SODIUM SERPL-SCNC: 135 MMOL/L (ref 136–145)
SOURCE, COVRS: NORMAL
SP GR UR REFRACTOMETRY: >1.03 (ref 1–1.03)
SPECIMEN TYPE: ABNORMAL
UA: UC IF INDICATED,UAUC: ABNORMAL
UROBILINOGEN UR QL STRIP.AUTO: 0.2 EU/DL (ref 0.2–1)
WBC # BLD AUTO: 16.9 K/UL (ref 3.6–11)
WBC URNS QL MICRO: ABNORMAL /HPF (ref 0–4)

## 2022-11-06 PROCEDURE — 74011636637 HC RX REV CODE- 636/637: Performed by: STUDENT IN AN ORGANIZED HEALTH CARE EDUCATION/TRAINING PROGRAM

## 2022-11-06 PROCEDURE — 87635 SARS-COV-2 COVID-19 AMP PRB: CPT

## 2022-11-06 PROCEDURE — 87804 INFLUENZA ASSAY W/OPTIC: CPT

## 2022-11-06 PROCEDURE — 96360 HYDRATION IV INFUSION INIT: CPT

## 2022-11-06 PROCEDURE — 74011250636 HC RX REV CODE- 250/636: Performed by: STUDENT IN AN ORGANIZED HEALTH CARE EDUCATION/TRAINING PROGRAM

## 2022-11-06 PROCEDURE — 36415 COLL VENOUS BLD VENIPUNCTURE: CPT

## 2022-11-06 PROCEDURE — 94640 AIRWAY INHALATION TREATMENT: CPT

## 2022-11-06 PROCEDURE — 71045 X-RAY EXAM CHEST 1 VIEW: CPT

## 2022-11-06 PROCEDURE — 96361 HYDRATE IV INFUSION ADD-ON: CPT

## 2022-11-06 PROCEDURE — 80053 COMPREHEN METABOLIC PANEL: CPT

## 2022-11-06 PROCEDURE — 99285 EMERGENCY DEPT VISIT HI MDM: CPT

## 2022-11-06 PROCEDURE — 82010 KETONE BODYS QUAN: CPT

## 2022-11-06 PROCEDURE — 93005 ELECTROCARDIOGRAM TRACING: CPT

## 2022-11-06 PROCEDURE — 82803 BLOOD GASES ANY COMBINATION: CPT

## 2022-11-06 PROCEDURE — 85025 COMPLETE CBC W/AUTO DIFF WBC: CPT

## 2022-11-06 PROCEDURE — 74011000250 HC RX REV CODE- 250: Performed by: STUDENT IN AN ORGANIZED HEALTH CARE EDUCATION/TRAINING PROGRAM

## 2022-11-06 PROCEDURE — 81001 URINALYSIS AUTO W/SCOPE: CPT

## 2022-11-06 RX ORDER — IPRATROPIUM BROMIDE AND ALBUTEROL SULFATE 2.5; .5 MG/3ML; MG/3ML
3 SOLUTION RESPIRATORY (INHALATION)
Status: COMPLETED | OUTPATIENT
Start: 2022-11-06 | End: 2022-11-06

## 2022-11-06 RX ORDER — SODIUM CHLORIDE 9 MG/ML
1000 INJECTION, SOLUTION INTRAVENOUS ONCE
Status: COMPLETED | OUTPATIENT
Start: 2022-11-06 | End: 2022-11-06

## 2022-11-06 RX ORDER — INSULIN LISPRO 100 [IU]/ML
10 INJECTION, SOLUTION INTRAVENOUS; SUBCUTANEOUS ONCE
Status: COMPLETED | OUTPATIENT
Start: 2022-11-06 | End: 2022-11-06

## 2022-11-06 RX ADMIN — SODIUM CHLORIDE 1000 ML: 9 INJECTION, SOLUTION INTRAVENOUS at 19:44

## 2022-11-06 RX ADMIN — IPRATROPIUM BROMIDE AND ALBUTEROL SULFATE 3 ML: .5; 3 SOLUTION RESPIRATORY (INHALATION) at 19:44

## 2022-11-06 RX ADMIN — INSULIN LISPRO 10 UNITS: 100 INJECTION, SOLUTION INTRAVENOUS; SUBCUTANEOUS at 19:44

## 2022-11-06 NOTE — ED TRIAGE NOTES
Pt reports has asthma, pt reports has history and has been taking nebulizers at home for the last couple weeks and steroids, pt also had syncopal episode at home because of asthma exacerbation;pt was sitting in bed when happened, denies hitting head. Ems arrived and pt was alert and oriented, high blood sugar reading, ems gave breathing treatment en route, pt in no acute distress at this time.

## 2022-11-07 LAB
ATRIAL RATE: 115 BPM
CALCULATED P AXIS, ECG09: 49 DEGREES
CALCULATED R AXIS, ECG10: 23 DEGREES
CALCULATED T AXIS, ECG11: 105 DEGREES
DIAGNOSIS, 93000: NORMAL
P-R INTERVAL, ECG05: 134 MS
Q-T INTERVAL, ECG07: 326 MS
QRS DURATION, ECG06: 74 MS
QTC CALCULATION (BEZET), ECG08: 450 MS
VENTRICULAR RATE, ECG03: 115 BPM

## 2022-11-07 NOTE — DISCHARGE INSTRUCTIONS
Please resume your insulin  Watch your diet since you were recently on steroids and these can elevate your blood sugars  Please check your sugars at least 4 times per day---if having trouble managing please call your doctor or return here

## 2022-11-07 NOTE — ED NOTES
Bedside and Verbal shift change report given to Sai (oncoming nurse) by Emi Lainez (offgoing nurse). Report included the following information SBAR, ED Summary, MAR and Recent Results.

## 2022-11-07 NOTE — ED PROVIDER NOTES
The patient is a 59-year-old female history of anxiety, depression, diabetes and obesity presenting today with asthma exacerbation. Patient reports that she has been dealing with this asthma exacerbation on and off for weeks now. Tonight she felt so short of breath that she got lightheaded and thinks she may have passed out. This is not atypical for her to get on a yearly basis. Reports that she has been on steroids recently, noted to be hyperglycemic upon arrival.  Patient was given a DuoNeb in route with EMS and feeling better. Denies cough or fever. Does have a raspy voice but this is typical for her when she has an asthma exacerbation. Patient states that she has been on steroids which is causing her sugar to go up, has been off of insulin for several months but her mom is a nurse and she will go back on her insulin after discharge. Denies fever, vomiting, diarrhea, chest pain or any other complaints at this time.        Past Medical History:   Diagnosis Date    Alcohol abuse     Anxiety and depression     Asthma     DM (diabetes mellitus), type 2 ([facility] [address]     Morbid obesity ([facility] [address] 10/28/2014    Stroke [facility])        Past Surgical History:   Procedure Laterality Date    HX GYN      3 C-sections    HX GYN      Miscarriage         Family History:   Problem Relation Age of Onset    Hypertension Maternal Aunt     Hypertension Maternal Uncle     Thyroid Disease Maternal Uncle     Hypertension Maternal Grandmother     Cancer Maternal Grandfather     Hypertension Maternal Grandfather     Diabetes Paternal Grandmother     Cancer Paternal Grandmother     Hypertension Mother     Alcohol abuse Father     Substance Abuse Father     Hypertension Maternal Aunt     Hypertension Maternal Uncle     Stroke Maternal Uncle     Hypertension Maternal Uncle     Alcohol abuse Maternal Uncle     Hypertension Maternal Uncle     Hypertension Maternal Uncle     Arrhythmia Maternal Uncle        Social History     Socioeconomic History Marital status:      Spouse name: Not on file    Number of children: Not on file    Years of education: Not on file    Highest education level: Not on file   Occupational History    Not on file   Tobacco Use    Smoking status: Former     Packs/day: 1.00     Types: Cigarettes     Start date: 2014     Quit date: 2016     Years since quittin.8    Smokeless tobacco: Never   Vaping Use    Vaping Use: Never used   Substance and Sexual Activity    Alcohol use: Yes     Alcohol/week: 0.0 standard drinks     Comment: daily    Drug use: No    Sexual activity: Yes     Partners: Male   Other Topics Concern    Not on file   Social History Narrative    Not on file     Social Determinants of Health     Financial Resource Strain: High Risk    Difficulty of Paying Living Expenses: Hard   Food Insecurity: Food Insecurity Present    Worried About Running Out of Food in the Last Year: Sometimes true    Ran Out of Food in the Last Year: Sometimes true   Transportation Needs: Not on file   Physical Activity: Not on file   Stress: Not on file   Social Connections: Not on file   Intimate Partner Violence: Not on file   Housing Stability: Not on file         ALLERGIES: Metformin and Pcn [penicillins]    Review of Systems   Constitutional:  Negative for chills and fever. HENT:  Negative for congestion and rhinorrhea. Eyes:  Negative for redness and visual disturbance. Respiratory:  Positive for chest tightness, shortness of breath and wheezing. Negative for cough. Cardiovascular:  Negative for chest pain and leg swelling. Gastrointestinal:  Negative for abdominal pain, diarrhea, nausea and vomiting. Endocrine:        + Elevated blood sugars   Genitourinary:  Negative for dysuria, flank pain, frequency, hematuria and urgency. Musculoskeletal:  Negative for arthralgias, back pain, myalgias and neck pain. Skin:  Negative for rash and wound. Allergic/Immunologic: Negative for immunocompromised state. Neurological:  Positive for syncope. Negative for dizziness and headaches. Vitals:    11/06/22 2230 11/06/22 2300 11/06/22 2315 11/06/22 2330   BP: (!) 150/101 (!) 175/99 (!) 167/93 (!) 164/98   Pulse: (!) 113 97 95 87   Resp: 22 20 24 19   Temp:       SpO2: 95% 96% 98% 97%   Weight:       Height:                Physical Exam  Vitals and nursing note reviewed. Constitutional:       General: She is not in acute distress. Appearance: She is well-developed. She is obese. She is not diaphoretic. HENT:      Head: Normocephalic. Mouth/Throat:      Pharynx: No oropharyngeal exudate. Eyes:      General:         Right eye: No discharge. Left eye: No discharge. Pupils: Pupils are equal, round, and reactive to light. Cardiovascular:      Rate and Rhythm: Regular rhythm. Tachycardia present. Heart sounds: Normal heart sounds. No murmur heard. No friction rub. No gallop. Pulmonary:      Effort: Pulmonary effort is normal. No respiratory distress. Breath sounds: Normal breath sounds. No stridor. No rales. Comments: Minimal expiratory wheeze  Speaking full sentences  No respiratory distress  Abdominal:      General: Bowel sounds are normal. There is no distension. Palpations: Abdomen is soft. Tenderness: There is no abdominal tenderness. There is no guarding or rebound. Musculoskeletal:         General: No deformity. Normal range of motion. Cervical back: Normal range of motion and neck supple. Skin:     General: Skin is warm and dry. Capillary Refill: Capillary refill takes less than 2 seconds. Findings: No rash. Neurological:      Mental Status: She is alert and oriented to person, place, and time.    Psychiatric:         Behavior: Behavior normal.        MDM     Amount and/or Complexity of Data Reviewed  Decide to obtain previous medical records or to obtain history from someone other than the patient: yes      ED Course as of 11/07/22 [id number] Dalia Gentile Nov 06, 2022 1921 EKG at 1837 read at 1910 shows sinus tachycardia 115 bpm [VM]      ED Course User Index  [VM] Kolby Zhu MD       Procedures      Upon arrival patient given another DuoNeb  2 L of IV fluids and IV insulin given    Sugar went from 742 to the 400s  Ketones negative  No anion marginal anion gap with normal CO2  COVID and flu negative  Chest x-ray clear    On reevaluation patient feeling much better. No wheezing noted. Tachycardia improved        Patient is a 17-year-old female presenting today with what she felt was an asthma exacerbation. She received a DuoNeb with EMS and by the time she arrived here was feeling better. On exam initially had a slight expiratory wheeze and was tachycardic. She was given a repeat duo nebs with complete resolution of shortness of breath. Asthma exacerbations are not atypical for her and she feels like she usually deals with these for weeks at a time. She has been on steroids recently, including a recent dose of IV steroids. Her sugars were elevated here but no evidence of DKA. Sugars improving with insulin and IV fluids. Advised her to start back on her insulin especially while she is on steroids at home. Advised her that if her sugars are getting more out of control or she starts to feel worse again she should return immediately. Her mom is an RN and can help manage her insulin/glucose. Patient discharged home in stable condition. Total critical care time spent exclusive of procedures:  37  Due to a high probability of clinically significant, life threatening deterioration, the patient required my highest level of preparedness to intervene emergently and I personally spent this critical care time directly and personally managing the patient.  This critical care time included obtaining a history; examining the patient; pulse oximetry; ordering and review of studies; arranging urgent treatment with development of a management plan; evaluation of patient's response to treatment; frequent reassessment; and, discussions with other providers. This critical care time was performed to assess and manage the high probability of imminent, life-threatening deterioration that could result in multi-organ failure. It was exclusive of separately billable procedures and treating other patients and teaching time. ICD-10-CM ICD-9-CM    1. Intermittent asthma with acute exacerbation, unspecified asthma severity  J45.21 493.92       2.  Type 1 diabetes mellitus with hyperglycemia ([facility])  E10.65 250.01           Disposition: Discharge    [facility] Kimberly, DO

## 2022-11-09 ENCOUNTER — PATIENT OUTREACH (OUTPATIENT)
Dept: CASE MANAGEMENT | Age: 42
End: 2022-11-09

## 2022-11-10 NOTE — PROGRESS NOTES
Ambulatory Care Management Note  Date/Time:  11/10/2022 4:32 PM    This patient was received as a referral from Daily assignment. Ambulatory Care Manager outreached to patient today to offer care management services. Introduction to self and role of care manager provided. Patient accepted care management services at this time. Follow up call scheduled at this time. Patient has Ambulatory Care Manager's contact number for any questions or concerns. Patient has Dextron scanner at home. Was asked to check her BS and she says it is reading high. Has been on Prednisone for asthma. Has had 3 Ed visits in last month. Offered to schedule a follow up, patient will call to schedule. Patient will keep log of BS and BP for next follow up visit.

## 2022-11-11 ENCOUNTER — VIRTUAL VISIT (OUTPATIENT)
Dept: FAMILY MEDICINE CLINIC | Age: 42
End: 2022-11-11
Payer: MEDICAID

## 2022-11-11 DIAGNOSIS — J45.50 SEVERE PERSISTENT ASTHMA WITHOUT COMPLICATION: Primary | ICD-10-CM

## 2022-11-11 DIAGNOSIS — E11.69 DIABETES MELLITUS TYPE 2 IN OBESE (HCC): ICD-10-CM

## 2022-11-11 DIAGNOSIS — I10 PRIMARY HYPERTENSION: ICD-10-CM

## 2022-11-11 DIAGNOSIS — E66.9 DIABETES MELLITUS TYPE 2 IN OBESE (HCC): ICD-10-CM

## 2022-11-11 DIAGNOSIS — J82.83 EOSINOPHILIC ASTHMA: ICD-10-CM

## 2022-11-11 DIAGNOSIS — E11.65 UNCONTROLLED TYPE 2 DIABETES MELLITUS WITH HYPERGLYCEMIA (HCC): ICD-10-CM

## 2022-11-11 PROCEDURE — 99214 OFFICE O/P EST MOD 30 MIN: CPT | Performed by: FAMILY MEDICINE

## 2022-11-11 PROCEDURE — 3051F HG A1C>EQUAL 7.0%<8.0%: CPT | Performed by: FAMILY MEDICINE

## 2022-11-11 RX ORDER — HYDROCHLOROTHIAZIDE 12.5 MG/1
12.5 TABLET ORAL DAILY
Qty: 30 TABLET | Refills: 1 | Status: SHIPPED | OUTPATIENT
Start: 2022-11-11

## 2022-11-11 RX ORDER — INSULIN LISPRO 100 [IU]/ML
INJECTION, SOLUTION INTRAVENOUS; SUBCUTANEOUS
Qty: 10 ML | Refills: 2 | Status: SHIPPED | OUTPATIENT
Start: 2022-11-11

## 2022-11-11 NOTE — PROGRESS NOTES
Chief Complaint   Patient presents with    Asthma     Follow-up    1. Have you been to the ER, urgent care clinic since your last visit? Hospitalized since your last visit? [facility] 11/7/22  Acute asthma    2. Have you seen or consulted any other health care providers outside of the [address] [place] [address] since your last visit? Include any pap smears or colon screening.  No

## 2022-11-11 NOTE — PROGRESS NOTES
Sumi Allison is a 38 y.o. female who was seen by synchronous (real-time) audio-video technology on 11/11/2022 for Asthma (Follow-up)      Assessment/ Plan:    Diagnoses and all orders for this visit:    1. Severe persistent asthma without complication  2. Eosinophilic asthma  - to set pt up for Dupixent starting with 600 mg dose followed by 300 mg every other 14 days - will plan for home injections after the first injection as her mother will be able to give these as an LPN. Would hopefully be able to wean down on steroids with this as well. Knows this may take 3-4 months to show sig improvement. Encouraged tx of mold vs.  Moving out of her home as this is likely sig exacerbating her asthma in the setting of her known allergy to mold. Rechecking labs over the next few weeks. To follow up with Pulm. -     dupilumab (DUPIXENT) 300 mg/2 mL syrg syringe; 4 mL every fourteen (14) days for 1 day, THEN 2 mL every fourteen (14) days for 90 days. Indications: asthma with elevated level of eosinophils, severe persistent asthma  -     IMMUNOGLOBULIN E, QT; Future  -     CBC WITH AUTOMATED DIFF; Future    3. Diabetes mellitus type 2 in obese ([address]  4. Uncontrolled type 2 diabetes mellitus with hyperglycemia (HCC)  - Restarting Trulicity at lower dose and will titrate up on dose. Increasing insulin as still seeing glu in 200-300s. May be able to wean back off after coming back down on steroid dose. -     dulaglutide (TRULICITY) 5.04 JL/0.5 mL sub-q pen; 0.5 mL by SubCUTAneous route every seven (7) days. -     insulin nph-regular human rec (HUMULIN 70-30) 100 unit/mL (70-30) inpn; 40 Units by SubCUTAneous route Before breakfast and dinner for 90 days.  -     HumaLOG U-100 Insulin 100 unit/mL injection; Take 30 units before lunch and sliding scale at bedtime as directed  -     HEMOGLOBIN A1C WITH EAG; Future  -     METABOLIC PANEL, BASIC; Future    5.  Primary hypertension - uncontrolled maria t on large doses of steroids, adding on HCTZ today and ct losartan 100 mg, Atenolol 50 mg, and amlodipine 10 mg daily  -     hydroCHLOROthiazide (HYDRODIURIL) 12.5 mg tablet; Take 1 Tablet by mouth daily.  -     METABOLIC PANEL, BASIC; Future    I spent at least 35 minutes on this visit with this established patient. Follow-up and Dispositions    Return in about 4 weeks (around 12/9/2022), or if symptoms worsen or fail to improve. 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 Severe Eosinophilic Asthma - She has been in the ER 3 x recently due to asthma flaring (10/6/22, 10/27/22, 11/7/22). Recently found mold in her apartment (was told this was treated but never resolved), planning to move soon. Was discharged from ER and was supposed to see Pulm but has was unable to be seen until end of the year. Seeing Pulmonology and She has had numerous issues getting her medications approve and started recently and interested in getting this ordered today. She has tried Nucala (Mepolizumab) x 6 months. Unfortunately, she had trouble with missing doses occ because of numerous hospitalizations and trouble getting rescheduled partly d/t rides to the [facility] office (unable to drive due to vision related to corneal edema, hydrops, and keartoconus). Pulm wanted to switch off Nucala and start [address] in 4/2021 but unable to get it processed through specialty pharmacy. Reviewed notes from [facility] and ordered [address] in 11/2021. Currently on Advair, Albuterol, singulair, Prednisone 20 mg daily, and was started on Breztri by Pulm in place of symbicort and Atrovent.     Prev failed Symbicort, Combivent, Spiriva, as well as numerous other medications are reviewed    PFTs in 11/2020 with FEV1 1.74, FVC 2.05, FEV1/FVC 84%, TLC 4.13, RV 1.20, DLCO 48%  Allergy lab testing in 11/5/2020 with IgE 56 and absolute eosinophils 0.2. Had allergy skin testing in 10/2020 which was positive for mold and weed  Abs Eosinophils 7/13/2020: 0.21. Mom is an LPN and will be able to give injections at home. Diabetes Mellitus 2:- sugar were doing excellent off insulin while she has not been on steroids recently. Prednisone was incr to 10 mg in 9/2022 by [facility]. Sugars started going up and did not start back on insulin. She ended up having 3 asthma attacks as noted above - she had high doses of steroids during this time and sugars jumped to 700s in ER recently. She notes she was not taking insulin at this time and had recently run out of Trulicity. A1c was 7.1% in 6/2022 while not on insulin and on low-dose steroids and his asthma was well controlled.     Lab Results   Component Value Date/Time    Hemoglobin A1c (POC) 8.1 05/05/2022 11:06 AM    Hemoglobin A1c (POC) 6.5 03/30/2016 10:46 AM    Hemoglobin A1c 7.1 (H) 06/01/2022 11:51 AM    Microalb/Creat ratio (ug/mg creat.) 18 02/26/2022 09:21 AM    LDL, calculated 39.8 06/17/2022 04:04 AM    Creatinine 1.10 (H) 11/06/2022 06:33 PM      Lab Results   Component Value Date/Time    GFR est AA >60 06/16/2022 05:43 PM    GFR est non-AA >60 06/16/2022 05:43 PM      Lab Results   Component Value Date/Time    TSH 3.230 02/26/2022 09:21 AM       ROS per HPI    Past Medical History:   Diagnosis Date    Alcohol abuse     Anxiety and depression     Asthma     DM (diabetes mellitus), type 2 ([address]     Morbid obesity ([facility] [address] 10/28/2014    Stroke [facility])      Past Surgical History:   Procedure Laterality Date    HX GYN      3 C-sections    HX GYN      Miscarriage        Objective:     Patient-Reported Vitals 8/8/2022   Patient-Reported Weight 285 lb   Patient-Reported Pulse 96   Patient-Reported Temperature -   Patient-Reported Systolic  408   Patient-Reported Diastolic 726      Physical exam:  General appearance - alert, well appearing, and in no distress  Eyes -sclera anicteric, no discharge  HEENT- normocephalic, atraumatic, moist mucous membranes, no visualized neck mass  Chest -normal respiratory effort, no visualized signs of respiratory distress, few coughing spasms episodically  Neurological - alert, awake, normal speech, no focal findings or movement disorder noted  Psych - normal mood and affect  Skin- no apparent lesions    We discussed the expected course, resolution and complications of the diagnosis(es) in detail. Medication risks, benefits, costs, interactions, and alternatives were discussed as indicated. I advised her to contact the office if her condition worsens, changes or fails to improve as anticipated. She expressed understanding with the diagnosis(es) and plan. [facility], was evaluated through a synchronous (real-time) audio-video encounter. The patient (or guardian if applicable) is aware that this is a billable service, which includes applicable co-pays. This Virtual Visit was conducted with patient's (and/or legal guardian's) consent. The visit was conducted pursuant to the emergency declaration under the [address] [place] [address] authority and the [employer] Act. Patient identification was verified, and a caregiver was present when appropriate.   The patient was located at: Home: [id number]  The provider was located at: Home: [unfilled]        Sue Mcguire MD

## 2022-11-18 ENCOUNTER — PATIENT OUTREACH (OUTPATIENT)
Dept: CASE MANAGEMENT | Age: 42
End: 2022-11-18

## 2022-11-18 DIAGNOSIS — J45.50 SEVERE PERSISTENT ASTHMA WITHOUT COMPLICATION: ICD-10-CM

## 2022-11-18 DIAGNOSIS — J82.83 EOSINOPHILIC ASTHMA: ICD-10-CM

## 2022-11-18 NOTE — TELEPHONE ENCOUNTER
Suezanne Nissen with [address] [place] [address]       Would like script sent for:  dupilumab (DUPIXENT) 300 mg/2 mL syrg syringe       Best number to reach him is [phone]  Fax [phone]

## 2022-11-21 DIAGNOSIS — J82.83 EOSINOPHILIC ASTHMA: ICD-10-CM

## 2022-11-21 DIAGNOSIS — J45.50 SEVERE PERSISTENT ASTHMA WITHOUT COMPLICATION: ICD-10-CM

## 2022-11-21 NOTE — ACP (ADVANCE CARE PLANNING)
Advance Care Planning:   Does patient have an Advance Directive: no,  health care decision makers updated

## 2022-11-21 NOTE — TELEPHONE ENCOUNTER
I spoke to Elian Prabhakar from [address]     They need a maintenance dose rx for    Dupixent 300mg/2ml     Phone  [phone]

## 2022-11-21 NOTE — PROGRESS NOTES
Ambulatory Care Management Note    Date/Time:  11/18/22 12:00 PM    This Ambulatory Care Manager (ACM) reviewed and updated the following screenings during this call; general assessment, disease specific assessment , self management assessment, SDOH assessments, ACP assessment and note, and medication reconciliation     Patient's challenges to self-management identified:   functional physical ability, level of motivation, medical condition, medication management, support system, and utilization of services      Medication Management:  good adherence and good understanding    Advance Care Planning:   Does patient have an Advance Directive: no,  health care decision makers updated    [employer], Referrals, and Durable Medical Equipment:       Health Maintenance Due   Topic Date Due    Hepatitis B Vaccine (1 of 3 - Risk 3-dose series) Never done    Cervical cancer screen  Never done    Foot Exam Q1  07/25/2017    Flu Vaccine (1) 08/01/2022     Health Maintenance Reviewed: yes    Patient was asked to consider health care goals that they would like to focus on with this ACM. ACM will follow up with patient to discuss goals and establish care plan in the next 7-14 days.        PCP/Specialist follow up: Patient completed follow up on 11/11/22

## 2022-12-12 ENCOUNTER — PATIENT OUTREACH (OUTPATIENT)
Dept: CASE MANAGEMENT | Age: 42
End: 2022-12-12

## 2022-12-12 NOTE — PROGRESS NOTES
12/12/22  ACM attempted to follow up with patient for CCM assessment. Attempts to reach patient were unsuccessful. On final call a VM was left for patient with the following information: [facility] contact information and reason for call. Will follow up again in 7 days. Mother (Livan Velasco) who stated she is the nurse for Dr. Kirill Dueñas has a  that is helping. AC contact information given. Asked to have patient return call.

## 2022-12-16 DIAGNOSIS — J45.50 SEVERE PERSISTENT ASTHMA WITHOUT COMPLICATION: ICD-10-CM

## 2022-12-16 DIAGNOSIS — E11.69 DIABETES MELLITUS TYPE 2 IN OBESE (HCC): ICD-10-CM

## 2022-12-16 DIAGNOSIS — E11.65 UNCONTROLLED TYPE 2 DIABETES MELLITUS WITH HYPERGLYCEMIA (HCC): ICD-10-CM

## 2022-12-16 DIAGNOSIS — I10 PRIMARY HYPERTENSION: ICD-10-CM

## 2022-12-16 DIAGNOSIS — F33.1 MODERATE EPISODE OF RECURRENT MAJOR DEPRESSIVE DISORDER (HCC): ICD-10-CM

## 2022-12-16 DIAGNOSIS — R25.2 LEG CRAMPS: ICD-10-CM

## 2022-12-16 DIAGNOSIS — F43.10 PTSD (POST-TRAUMATIC STRESS DISORDER): ICD-10-CM

## 2022-12-16 DIAGNOSIS — E66.9 DIABETES MELLITUS TYPE 2 IN OBESE (HCC): ICD-10-CM

## 2022-12-16 DIAGNOSIS — F43.21 ADJUSTMENT DISORDER WITH DEPRESSED MOOD: ICD-10-CM

## 2022-12-16 DIAGNOSIS — I10 ESSENTIAL HYPERTENSION: ICD-10-CM

## 2022-12-16 DIAGNOSIS — K21.9 GASTROESOPHAGEAL REFLUX DISEASE, UNSPECIFIED WHETHER ESOPHAGITIS PRESENT: ICD-10-CM

## 2022-12-16 RX ORDER — ESCITALOPRAM OXALATE 20 MG/1
20 TABLET ORAL DAILY
Qty: 90 TABLET | Refills: 0 | Status: SHIPPED | OUTPATIENT
Start: 2022-12-16

## 2022-12-16 RX ORDER — LOSARTAN POTASSIUM 100 MG/1
100 TABLET ORAL DAILY
Qty: 90 TABLET | Refills: 2 | Status: SHIPPED | OUTPATIENT
Start: 2022-12-16

## 2022-12-16 RX ORDER — PANTOPRAZOLE SODIUM 40 MG/1
40 TABLET, DELAYED RELEASE ORAL DAILY
Qty: 90 TABLET | Refills: 2 | Status: SHIPPED | OUTPATIENT
Start: 2022-12-16

## 2022-12-16 RX ORDER — ATORVASTATIN CALCIUM 20 MG/1
20 TABLET, FILM COATED ORAL DAILY
Qty: 90 TABLET | Refills: 1 | Status: SHIPPED | OUTPATIENT
Start: 2022-12-16

## 2022-12-16 RX ORDER — BLOOD-GLUCOSE SENSOR
EACH MISCELLANEOUS
Qty: 9 EACH | Refills: 2 | Status: SHIPPED | OUTPATIENT
Start: 2022-12-16

## 2022-12-16 RX ORDER — BLOOD-GLUCOSE TRANSMITTER
EACH MISCELLANEOUS
Qty: 1 EACH | Refills: 1 | Status: SHIPPED | OUTPATIENT
Start: 2022-12-16

## 2022-12-16 RX ORDER — HYDROCHLOROTHIAZIDE 12.5 MG/1
12.5 TABLET ORAL DAILY
Qty: 90 TABLET | Refills: 1 | Status: SHIPPED | OUTPATIENT
Start: 2022-12-16

## 2022-12-16 RX ORDER — BUPROPION HYDROCHLORIDE 150 MG/1
300 TABLET ORAL DAILY
Qty: 180 TABLET | Refills: 0 | Status: SHIPPED | OUTPATIENT
Start: 2022-12-16

## 2022-12-16 RX ORDER — ATENOLOL 50 MG/1
50 TABLET ORAL DAILY
Qty: 90 TABLET | Refills: 1 | Status: SHIPPED | OUTPATIENT
Start: 2022-12-16

## 2022-12-16 RX ORDER — ALBUTEROL SULFATE 90 UG/1
2 AEROSOL, METERED RESPIRATORY (INHALATION)
Qty: 3 EACH | Refills: 1 | Status: SHIPPED | OUTPATIENT
Start: 2022-12-16

## 2022-12-16 RX ORDER — LANOLIN ALCOHOL/MO/W.PET/CERES
400 CREAM (GRAM) TOPICAL DAILY
Qty: 90 TABLET | Refills: 1 | Status: SHIPPED | OUTPATIENT
Start: 2022-12-16

## 2022-12-16 RX ORDER — GLIPIZIDE 5 MG/1
5 TABLET ORAL
Qty: 90 TABLET | Refills: 1 | Status: SHIPPED | OUTPATIENT
Start: 2022-12-16

## 2022-12-16 RX ORDER — BUDESONIDE, GLYCOPYRROLATE, AND FORMOTEROL FUMARATE 160; 9; 4.8 UG/1; UG/1; UG/1
1 AEROSOL, METERED RESPIRATORY (INHALATION) 2 TIMES DAILY
Qty: 3 EACH | Refills: 1 | Status: SHIPPED | OUTPATIENT
Start: 2022-12-16

## 2022-12-16 RX ORDER — AMLODIPINE BESYLATE 10 MG/1
10 TABLET ORAL DAILY
Qty: 180 TABLET | Refills: 2 | Status: SHIPPED | OUTPATIENT
Start: 2022-12-16

## 2022-12-16 RX ORDER — MONTELUKAST SODIUM 10 MG/1
10 TABLET ORAL DAILY
Qty: 90 TABLET | Refills: 2 | Status: SHIPPED | OUTPATIENT
Start: 2022-12-16

## 2022-12-16 RX ORDER — MIRTAZAPINE 15 MG/1
15 TABLET, FILM COATED ORAL
Qty: 90 TABLET | Refills: 0 | Status: SHIPPED | OUTPATIENT
Start: 2022-12-16

## 2022-12-18 VITALS
DIASTOLIC BLOOD PRESSURE: 95 MMHG | TEMPERATURE: 98.3 F | SYSTOLIC BLOOD PRESSURE: 146 MMHG | RESPIRATION RATE: 16 BRPM | OXYGEN SATURATION: 96 % | HEART RATE: 115 BPM

## 2022-12-18 LAB
GLUCOSE BLD STRIP.AUTO-MCNC: 549 MG/DL (ref 65–117)
SERVICE CMNT-IMP: ABNORMAL

## 2022-12-18 PROCEDURE — 82962 GLUCOSE BLOOD TEST: CPT

## 2022-12-18 PROCEDURE — 85025 COMPLETE CBC W/AUTO DIFF WBC: CPT

## 2022-12-18 PROCEDURE — 80053 COMPREHEN METABOLIC PANEL: CPT

## 2022-12-18 PROCEDURE — 99284 EMERGENCY DEPT VISIT MOD MDM: CPT

## 2022-12-18 PROCEDURE — 36415 COLL VENOUS BLD VENIPUNCTURE: CPT

## 2022-12-18 PROCEDURE — 93005 ELECTROCARDIOGRAM TRACING: CPT

## 2022-12-19 ENCOUNTER — HOSPITAL ENCOUNTER (EMERGENCY)
Age: 42
Discharge: HOME | End: 2022-12-19
Attending: STUDENT IN AN ORGANIZED HEALTH CARE EDUCATION/TRAINING PROGRAM
Payer: MEDICAID

## 2022-12-19 DIAGNOSIS — Z79.4 TYPE 2 DIABETES MELLITUS WITH HYPERGLYCEMIA, WITH LONG-TERM CURRENT USE OF INSULIN (HCC): ICD-10-CM

## 2022-12-19 DIAGNOSIS — J45.41 MODERATE PERSISTENT ASTHMA WITH ACUTE EXACERBATION: Primary | ICD-10-CM

## 2022-12-19 DIAGNOSIS — E11.65 TYPE 2 DIABETES MELLITUS WITH HYPERGLYCEMIA, WITH LONG-TERM CURRENT USE OF INSULIN (HCC): ICD-10-CM

## 2022-12-19 LAB
ALBUMIN SERPL-MCNC: 3.7 G/DL (ref 3.5–5)
ALBUMIN/GLOB SERPL: 1 {RATIO} (ref 1.1–2.2)
ALP SERPL-CCNC: 115 U/L (ref 45–117)
ALT SERPL-CCNC: 32 U/L (ref 12–78)
ANION GAP SERPL CALC-SCNC: 9 MMOL/L (ref 5–15)
AST SERPL-CCNC: 30 U/L (ref 15–37)
ATRIAL RATE: 120 BPM
BASOPHILS # BLD: 0.1 K/UL (ref 0–0.1)
BASOPHILS NFR BLD: 1 % (ref 0–1)
BILIRUB SERPL-MCNC: 0.2 MG/DL (ref 0.2–1)
BUN SERPL-MCNC: 16 MG/DL (ref 6–20)
BUN/CREAT SERPL: 13 (ref 12–20)
CALCIUM SERPL-MCNC: 9.4 MG/DL (ref 8.5–10.1)
CALCULATED P AXIS, ECG09: 69 DEGREES
CALCULATED R AXIS, ECG10: 78 DEGREES
CALCULATED T AXIS, ECG11: -20 DEGREES
CHLORIDE SERPL-SCNC: 98 MMOL/L (ref 97–108)
CO2 SERPL-SCNC: 25 MMOL/L (ref 21–32)
COMMENT, HOLDF: NORMAL
CREAT SERPL-MCNC: 1.25 MG/DL (ref 0.55–1.02)
DIAGNOSIS, 93000: NORMAL
DIFFERENTIAL METHOD BLD: NORMAL
EOSINOPHIL # BLD: 0.1 K/UL (ref 0–0.4)
EOSINOPHIL NFR BLD: 1 % (ref 0–7)
ERYTHROCYTE [DISTWIDTH] IN BLOOD BY AUTOMATED COUNT: 12.9 % (ref 11.5–14.5)
GLOBULIN SER CALC-MCNC: 3.8 G/DL (ref 2–4)
GLUCOSE BLD STRIP.AUTO-MCNC: 410 MG/DL (ref 65–117)
GLUCOSE BLD STRIP.AUTO-MCNC: 435 MG/DL (ref 65–117)
GLUCOSE SERPL-MCNC: 576 MG/DL (ref 65–100)
HCT VFR BLD AUTO: 41.3 % (ref 35–47)
HGB BLD-MCNC: 13.3 G/DL (ref 11.5–16)
IMM GRANULOCYTES # BLD AUTO: 0 K/UL (ref 0–0.04)
IMM GRANULOCYTES NFR BLD AUTO: 0 % (ref 0–0.5)
LYMPHOCYTES # BLD: 3.5 K/UL (ref 0.8–3.5)
LYMPHOCYTES NFR BLD: 33 % (ref 12–49)
MCH RBC QN AUTO: 29.7 PG (ref 26–34)
MCHC RBC AUTO-ENTMCNC: 32.2 G/DL (ref 30–36.5)
MCV RBC AUTO: 92.2 FL (ref 80–99)
MONOCYTES # BLD: 0.6 K/UL (ref 0–1)
MONOCYTES NFR BLD: 6 % (ref 5–13)
NEUTS SEG # BLD: 6.2 K/UL (ref 1.8–8)
NEUTS SEG NFR BLD: 59 % (ref 32–75)
NRBC # BLD: 0 K/UL (ref 0–0.01)
NRBC BLD-RTO: 0 PER 100 WBC
P-R INTERVAL, ECG05: 156 MS
PLATELET # BLD AUTO: 269 K/UL (ref 150–400)
PMV BLD AUTO: 10.3 FL (ref 8.9–12.9)
POTASSIUM SERPL-SCNC: 3.9 MMOL/L (ref 3.5–5.1)
PROT SERPL-MCNC: 7.5 G/DL (ref 6.4–8.2)
Q-T INTERVAL, ECG07: 324 MS
QRS DURATION, ECG06: 64 MS
QTC CALCULATION (BEZET), ECG08: 457 MS
RBC # BLD AUTO: 4.48 M/UL (ref 3.8–5.2)
SAMPLES BEING HELD,HOLD: NORMAL
SERVICE CMNT-IMP: ABNORMAL
SERVICE CMNT-IMP: ABNORMAL
SODIUM SERPL-SCNC: 132 MMOL/L (ref 136–145)
VENTRICULAR RATE, ECG03: 120 BPM
WBC # BLD AUTO: 10.5 K/UL (ref 3.6–11)

## 2022-12-19 PROCEDURE — 94664 DEMO&/EVAL PT USE INHALER: CPT

## 2022-12-19 PROCEDURE — 74011000250 HC RX REV CODE- 250: Performed by: NURSE PRACTITIONER

## 2022-12-19 PROCEDURE — 96361 HYDRATE IV INFUSION ADD-ON: CPT

## 2022-12-19 PROCEDURE — 74011250637 HC RX REV CODE- 250/637: Performed by: NURSE PRACTITIONER

## 2022-12-19 PROCEDURE — 74011250636 HC RX REV CODE- 250/636: Performed by: NURSE PRACTITIONER

## 2022-12-19 PROCEDURE — 94640 AIRWAY INHALATION TREATMENT: CPT

## 2022-12-19 PROCEDURE — 82962 GLUCOSE BLOOD TEST: CPT

## 2022-12-19 PROCEDURE — 96360 HYDRATION IV INFUSION INIT: CPT

## 2022-12-19 RX ORDER — IPRATROPIUM BROMIDE AND ALBUTEROL SULFATE 2.5; .5 MG/3ML; MG/3ML
3 SOLUTION RESPIRATORY (INHALATION)
Status: COMPLETED | OUTPATIENT
Start: 2022-12-19 | End: 2022-12-19

## 2022-12-19 RX ORDER — IBUPROFEN 400 MG/1
800 TABLET ORAL
Status: COMPLETED | OUTPATIENT
Start: 2022-12-19 | End: 2022-12-19

## 2022-12-19 RX ORDER — DEXAMETHASONE SODIUM PHOSPHATE 10 MG/ML
10 INJECTION INTRAMUSCULAR; INTRAVENOUS
Status: DISCONTINUED | OUTPATIENT
Start: 2022-12-19 | End: 2022-12-19

## 2022-12-19 RX ADMIN — IBUPROFEN 800 MG: 400 TABLET, FILM COATED ORAL at 02:02

## 2022-12-19 RX ADMIN — IPRATROPIUM BROMIDE AND ALBUTEROL SULFATE 3 ML: .5; 2.5 SOLUTION RESPIRATORY (INHALATION) at 01:12

## 2022-12-19 RX ADMIN — SODIUM CHLORIDE 1000 ML: 9 INJECTION, SOLUTION INTRAVENOUS at 00:51

## 2022-12-19 NOTE — DISCHARGE INSTRUCTIONS
Please follow-up with your pulmonology team in the morning to discuss ongoing management of your asthma. Would also recommend that you follow-up with your primary care provider as well to discuss management of your diabetes. Continue to take all medications as directed. Return to the emergency department for any worsening or worrisome concerns.

## 2022-12-19 NOTE — ED PROVIDER NOTES
HPI     Jessi Pollard is a 38 y.o. female with Hx of ETOH abuse, anxiety, depression, obesity, asthma, CVA  who presents via EMS w/ relative to [facility] ED with cc of cough. Patient reports history of chronic asthma with acute exacerbation. States this is a typical presentation for her asthma exacerbation. She was sleeping when she woke up with increased wheezing, voice hoarseness present, and unproductive cough. Had a puffs of an inhaler as well as a nebulizer treatment prior to arrival.  She is currently finishing a steroid taper. Is followed by pulmonology Robin Fisher and awaiting starting a new treatment. Next pulm appointment is 12/30/22. Has been seen in the emergency department numerous times. Usually goes to [place] ER, however, is currently staying with her mother. EMS thought the patient was having a panic attack, patient does have a history of anxiety, but did not feel that was associated with her symptoms tonight. Denies any recent COVID or flu symptoms. Patient's voice is always hoarse when she has asthma exacerbations. Denies chance of pregnancy. Former tobacco abuse, nothing current, no alcohol or other substance abuse. PCP: Mike Umaña MD    There are no other complaints, changes or physical findings at this time.           Past Medical History:   Diagnosis Date    Alcohol abuse     Anxiety and depression     Asthma     DM (diabetes mellitus), type 2 ([address]     Morbid obesity ([facility] 10/28/2014    Stroke [facility])        Past Surgical History:   Procedure Laterality Date    HX GYN      3 C-sections    HX GYN      Miscarriage         Family History:   Problem Relation Age of Onset    Hypertension Maternal Aunt     Hypertension Maternal Uncle     Thyroid Disease Maternal Uncle     Hypertension Maternal Grandmother     Cancer Maternal Grandfather     Hypertension Maternal Grandfather     Diabetes Paternal Grandmother     Cancer Paternal Grandmother     Hypertension Mother     Alcohol abuse Father     Substance Abuse Father     Hypertension Maternal Aunt     Hypertension Maternal Uncle     Stroke Maternal Uncle     Hypertension Maternal Uncle     Alcohol abuse Maternal Uncle     Hypertension Maternal Uncle     Hypertension Maternal Uncle     Arrhythmia Maternal Uncle        Social History     Socioeconomic History    Marital status:      Spouse name: Not on file    Number of children: Not on file    Years of education: Not on file    Highest education level: Not on file   Occupational History    Not on file   Tobacco Use    Smoking status: Former     Packs/day: 1.00     Types: Cigarettes     Start date: 2014     Quit date: 2016     Years since quittin.9    Smokeless tobacco: Never   Vaping Use    Vaping Use: Never used   Substance and Sexual Activity    Alcohol use: Yes     Alcohol/week: 0.0 standard drinks     Comment: daily    Drug use: No    Sexual activity: Yes     Partners: Male   Other Topics Concern    Not on file   Social History Narrative    Not on file     Social Determinants of Health     Financial Resource Strain: Medium Risk    Difficulty of Paying Living Expenses: Somewhat hard   Food Insecurity: Food Insecurity Present    Worried About Running Out of Food in the Last Year: Sometimes true    Ran Out of Food in the Last Year: Sometimes true   Transportation Needs: No Transportation Needs    Lack of Transportation (Medical): No    Lack of Transportation (Non-Medical):  No   Physical Activity: Inactive    Days of Exercise per Week: 0 days    Minutes of Exercise per Session: 0 min   Stress: No Stress Concern Present    Feeling of Stress : Not at all   Social Connections: Socially Isolated    Frequency of Communication with Friends and Family: Once a week    Frequency of Social Gatherings with Friends and Family: Once a week    Attends [employer] Services: Never    Active Member of Clubs or Organizations: No    Attends Club or Organization Meetings: Never    Marital Status:    Intimate Partner Violence: Not At Risk    Fear of Current or Ex-Partner: No    Emotionally Abused: No    Physically Abused: No    Sexually Abused: No   Housing Stability: Low Risk     Unable to Pay for Housing in the Last Year: No    Number of Places Lived in the Last Year: 1    Unstable Housing in the Last Year: No         ALLERGIES: Metformin and Pcn [penicillins]    Review of Systems   Constitutional:  Negative for activity change, appetite change, chills and fever. HENT:  Negative for congestion, rhinorrhea, sinus pressure and sore throat. Eyes:  Negative for visual disturbance. Respiratory:  Positive for cough, chest tightness, shortness of breath and wheezing. Cardiovascular:  Negative for chest pain. Gastrointestinal:  Negative for abdominal pain, diarrhea, nausea and vomiting. Genitourinary:  Negative for dysuria, flank pain, frequency and urgency. Musculoskeletal:  Negative for arthralgias and neck pain. Skin:  Negative for color change and rash. Neurological:  Negative for dizziness, weakness and headaches. Psychiatric/Behavioral:  Negative for agitation, behavioral problems and confusion. All other systems reviewed and are negative. Vitals:    12/18/22 2344   BP: (!) 146/95   Pulse: (!) 115   Resp: 16   Temp: 98.3 °F (36.8 °C)   SpO2: 96%            Physical Exam  Vitals and nursing note reviewed. Constitutional:       General: She is not in acute distress. Appearance: She is well-developed. HENT:      Head: Normocephalic and atraumatic. Right Ear: External ear normal.      Left Ear: External ear normal.   Eyes:      Conjunctiva/sclera: Conjunctivae normal.      Pupils: Pupils are equal, round, and reactive to light. Cardiovascular:      Rate and Rhythm: Normal rate and regular rhythm. Heart sounds: Normal heart sounds. Pulmonary:      Effort: Pulmonary effort is normal.      Breath sounds: Normal breath sounds.    Abdominal: Palpations: Abdomen is soft. Tenderness: There is no abdominal tenderness. There is no guarding or rebound. Musculoskeletal:         General: Normal range of motion. Cervical back: Normal range of motion and neck supple. Skin:     General: Skin is warm and dry. Neurological:      Mental Status: She is alert and oriented to person, place, and time. Psychiatric:         Behavior: Behavior normal.         Thought Content: Thought content normal.         Judgment: Judgment normal.        MDM  Number of Diagnoses or Management Options  Moderate persistent asthma with acute exacerbation  Type 2 diabetes mellitus with hyperglycemia, with long-term current use of insulin (HCC)  Diagnosis management comments: Differential diagnosis includes asthma, reactive airway disease, cough, upper respiratory infection, diabetes with or without crisis    Patient presents with acute on chronic asthma, multiple ER visits for exacerbation in the last few months. Just finished up taking a steroid pack. Lab work was reviewed and reassuring. Lung sounds were clear on exam, x2, during ER visit. Has nebulizer treatments and inhalers at home. Is followed by pulmonology. Blood sugar was elevated, but no evidence of crisis. Liter of fluid was given with improved glucose levels. Discussed importance of follow-up with primary care provider to help get better control of blood sugar. Reasons to return to the emergency department were provided and reviewed. Amount and/or Complexity of Data Reviewed  Clinical lab tests: reviewed and ordered  Review and summarize past medical records: yes  Discuss the patient with other providers: yes      ED Course as of 12/19/22 0254   Mon Dec 19, 2022   0004 EKG shows a sinus tachycardia with a rate of 120. T wave inversion in lead III poor R wave progression through the precordial leads. Normal intervals.  [DA]      ED Course User Index  [DA] Carlito Tiwari MD Procedures    LABORATORY TESTS:  Recent Results (from the past 12 hour(s))   GLUCOSE, POC    Collection Time: 12/18/22 11:45 PM   Result Value Ref Range    Glucose (POC) 549 (H) 65 - 117 mg/dL    Performed by Love Jameson    EKG, 12 LEAD, INITIAL    Collection Time: 12/18/22 11:49 PM   Result Value Ref Range    Ventricular Rate 120 BPM    Atrial Rate 120 BPM    P-R Interval 156 ms    QRS Duration 64 ms    Q-T Interval 324 ms    QTC Calculation (Bezet) 457 ms    Calculated P Axis 69 degrees    Calculated R Axis 78 degrees    Calculated T Axis -20 degrees    Diagnosis       Sinus tachycardia  Right atrial enlargement  Anteroseptal infarct , age undetermined  T wave abnormality, consider inferior ischemia  Abnormal ECG  When compared with ECG of 06-NOV-2022 18:37,  Anteroseptal infarct is now present  Inverted T waves have replaced nonspecific T wave abnormality in Inferior   leads     CBC WITH AUTOMATED DIFF    Collection Time: 12/18/22 11:57 PM   Result Value Ref Range    WBC 10.5 3.6 - 11.0 K/uL    RBC 4.48 3.80 - 5.20 M/uL    HGB 13.3 11.5 - 16.0 g/dL    HCT 41.3 35.0 - 47.0 %    MCV 92.2 80.0 - 99.0 FL    MCH 29.7 26.0 - 34.0 PG    MCHC 32.2 30.0 - 36.5 g/dL    RDW 12.9 11.5 - 14.5 %    PLATELET 068 057 - 271 K/uL    MPV 10.3 8.9 - 12.9 FL    NRBC 0.0 0  WBC    ABSOLUTE NRBC 0.00 0.00 - 0.01 K/uL    NEUTROPHILS 59 32 - 75 %    LYMPHOCYTES 33 12 - 49 %    MONOCYTES 6 5 - 13 %    EOSINOPHILS 1 0 - 7 %    BASOPHILS 1 0 - 1 %    IMMATURE GRANULOCYTES 0 0.0 - 0.5 %    ABS. NEUTROPHILS 6.2 1.8 - 8.0 K/UL    ABS. LYMPHOCYTES 3.5 0.8 - 3.5 K/UL    ABS. MONOCYTES 0.6 0.0 - 1.0 K/UL    ABS. EOSINOPHILS 0.1 0.0 - 0.4 K/UL    ABS. BASOPHILS 0.1 0.0 - 0.1 K/UL    ABS. IMM.  GRANS. 0.0 0.00 - 0.04 K/UL    DF AUTOMATED     METABOLIC PANEL, COMPREHENSIVE    Collection Time: 12/18/22 11:57 PM   Result Value Ref Range    Sodium 132 (L) 136 - 145 mmol/L    Potassium 3.9 3.5 - 5.1 mmol/L    Chloride 98 97 - 108 mmol/L CO2 25 21 - 32 mmol/L    Anion gap 9 5 - 15 mmol/L    Glucose 576 (H) 65 - 100 mg/dL    BUN 16 6 - 20 MG/DL    Creatinine 1.25 (H) 0.55 - 1.02 MG/DL    BUN/Creatinine ratio 13 12 - 20      eGFR 56 (L) >60 ml/min/1.73m2    Calcium 9.4 8.5 - 10.1 MG/DL    Bilirubin, total 0.2 0.2 - 1.0 MG/DL    ALT (SGPT) 32 12 - 78 U/L    AST (SGOT) 30 15 - 37 U/L    Alk. phosphatase 115 45 - 117 U/L    Protein, total 7.5 6.4 - 8.2 g/dL    Albumin 3.7 3.5 - 5.0 g/dL    Globulin 3.8 2.0 - 4.0 g/dL    A-G Ratio 1.0 (L) 1.1 - 2.2     SAMPLES BEING HELD    Collection Time: 12/18/22 11:57 PM   Result Value Ref Range    SAMPLES BEING HELD 1RED 1BLUE     COMMENT        Add-on orders for these samples will be processed based on acceptable specimen integrity and analyte stability, which may vary by analyte. GLUCOSE, POC    Collection Time: 12/19/22  1:56 AM   Result Value Ref Range    Glucose (POC) 435 (H) 65 - 117 mg/dL    Performed by Lissa Uribe LPN        IMAGING RESULTS:  No orders to display       MEDICATIONS GIVEN:  Medications   sodium chloride 0.9 % bolus infusion 1,000 mL (1,000 mL IntraVENous New Bag 12/19/22 0051)   albuterol-ipratropium (DUO-NEB) 2.5 MG-0.5 MG/3 ML (3 mL Nebulization Given 12/19/22 0112)   ibuprofen (MOTRIN) tablet 800 mg (800 mg Oral Given 12/19/22 0202)       IMPRESSION:  1. Moderate persistent asthma with acute exacerbation    2. Type 2 diabetes mellitus with hyperglycemia, with long-term current use of insulin (HCC)        PLAN:  1. Current Discharge Medication List        2.    Follow-up Information       Follow up With Specialties Details Why Contact Info    [address] [facility] Emergency Medicine Go to  As needed, If symptoms worsen [address] [place] [address]    Karma Juarez MD Pulmonary Disease Schedule an appointment as soon as possible for a visit  Call in the morning so they are aware that you were seen in the ER for asthma exacerbation [address] [facility]      Paul Dorman MD Family Medicine Schedule an appointment as soon as possible for a visit  Call to discuss diabetes management [address] [facility]  [phone]            3. Return to ED if worse       3:01 AM  Change of shift. Care of patient signed over to Ozzy King MD.  Bedside handoff complete. Awaiting BG re-evaluation, discharge paperwork prepared and plan of care discussed with patient and family member.    Shauna Bee NP

## 2022-12-19 NOTE — Clinical Note
[address]  [address] [place] [address]  [phone]    Work/School Note    Date: 12/18/2022    To Whom It May concern:    Leandro Martinez was seen and treated today in the emergency room by the following provider(s):  Attending Provider: Tim Moody MD  Nurse Practitioner: Rosa Tracey NP. Leandro Martinez is excused from work/school on 12/19/22 and 12/20/22. She is medically clear to return to work/school on 12/21/2022.        Sincerely,          Ben Adkins NP

## 2022-12-19 NOTE — ED TRIAGE NOTES
Patient arrives to ED complaining of cough and shortness of breath starting today. Patient reports using inhaler X2 tonight without relief.

## 2022-12-20 ENCOUNTER — PATIENT OUTREACH (OUTPATIENT)
Dept: CASE MANAGEMENT | Age: 42
End: 2022-12-20

## 2022-12-21 NOTE — PROGRESS NOTES
12/20/22   ACM attempted to follow up with patient for CCM assessment. Attempts to reach patient were unsuccessful. On final call a VM was left for patient with the following information: ACM contact information and reason for call. Will follow up again in 7 days.

## 2022-12-30 ENCOUNTER — PATIENT OUTREACH (OUTPATIENT)
Dept: CASE MANAGEMENT | Age: 42
End: 2022-12-30

## 2023-01-03 DIAGNOSIS — E66.9 DIABETES MELLITUS TYPE 2 IN OBESE (HCC): ICD-10-CM

## 2023-01-03 DIAGNOSIS — E11.65 UNCONTROLLED TYPE 2 DIABETES MELLITUS WITH HYPERGLYCEMIA (HCC): ICD-10-CM

## 2023-01-03 DIAGNOSIS — K21.9 GASTROESOPHAGEAL REFLUX DISEASE, UNSPECIFIED WHETHER ESOPHAGITIS PRESENT: ICD-10-CM

## 2023-01-03 DIAGNOSIS — F33.1 MODERATE EPISODE OF RECURRENT MAJOR DEPRESSIVE DISORDER (HCC): ICD-10-CM

## 2023-01-03 DIAGNOSIS — F43.10 PTSD (POST-TRAUMATIC STRESS DISORDER): ICD-10-CM

## 2023-01-03 DIAGNOSIS — E11.69 DIABETES MELLITUS TYPE 2 IN OBESE (HCC): ICD-10-CM

## 2023-01-03 DIAGNOSIS — I10 ESSENTIAL HYPERTENSION: ICD-10-CM

## 2023-01-04 NOTE — PROGRESS NOTES
Ambulatory Care Management Note      Date/Time:  1/4/2023 9:45 AM    Outbound call to patient again today and was unable to reach due to mailbox being full. At 10:27 AM [facility] Received a call back from mother Woody Lacey), who says she is at work and was taking call for Patient. Mother questioned who was calling and stated her daughter had Laryngitis and could not talk on phone. [facility] asked if a message could be given to patient for return call. The Mother stated, \"I just told you that she cannot talk, you aren't listening. \" Mother, than said \"goodbye\" and hung up. [facility] has made several attempts to reach patient for AC assessment and has been unable to reach patient. [facility] contact information given. Episode resolved. Patient has this Nurse Navigators contact information for any further questions, concerns, or needs.

## 2023-01-06 RX ORDER — ATENOLOL 50 MG/1
50 TABLET ORAL DAILY
Qty: 30 TABLET | Refills: 1 | Status: SHIPPED | OUTPATIENT
Start: 2023-01-06

## 2023-01-06 RX ORDER — BLOOD-GLUCOSE SENSOR
EACH MISCELLANEOUS
Qty: 3 EACH | Refills: 1 | Status: SHIPPED | OUTPATIENT
Start: 2023-01-06

## 2023-01-06 RX ORDER — AMLODIPINE BESYLATE 10 MG/1
10 TABLET ORAL DAILY
Qty: 30 TABLET | Refills: 1 | Status: SHIPPED | OUTPATIENT
Start: 2023-01-06

## 2023-01-06 RX ORDER — PANTOPRAZOLE SODIUM 40 MG/1
40 TABLET, DELAYED RELEASE ORAL DAILY
Qty: 30 TABLET | Refills: 1 | Status: SHIPPED | OUTPATIENT
Start: 2023-01-06

## 2023-01-06 RX ORDER — MIRTAZAPINE 15 MG/1
15 TABLET, FILM COATED ORAL
Qty: 30 TABLET | Refills: 1 | Status: SHIPPED | OUTPATIENT
Start: 2023-01-06

## 2023-01-06 RX ORDER — ATORVASTATIN CALCIUM 20 MG/1
20 TABLET, FILM COATED ORAL DAILY
Qty: 30 TABLET | Refills: 1 | Status: SHIPPED | OUTPATIENT
Start: 2023-01-06

## 2023-01-06 RX ORDER — LOSARTAN POTASSIUM 100 MG/1
100 TABLET ORAL DAILY
Qty: 30 TABLET | Refills: 1 | Status: SHIPPED | OUTPATIENT
Start: 2023-01-06

## 2023-01-12 ENCOUNTER — VIRTUAL VISIT (OUTPATIENT)
Dept: FAMILY MEDICINE CLINIC | Age: 43
End: 2023-01-12
Payer: MEDICAID

## 2023-01-12 DIAGNOSIS — E11.69 DIABETES MELLITUS TYPE 2 IN OBESE (HCC): ICD-10-CM

## 2023-01-12 DIAGNOSIS — E66.9 DIABETES MELLITUS TYPE 2 IN OBESE (HCC): ICD-10-CM

## 2023-01-12 DIAGNOSIS — E11.65 UNCONTROLLED TYPE 2 DIABETES MELLITUS WITH HYPERGLYCEMIA (HCC): ICD-10-CM

## 2023-01-12 DIAGNOSIS — E66.01 MORBID OBESITY WITH BMI OF 50.0-59.9, ADULT (HCC): ICD-10-CM

## 2023-01-12 DIAGNOSIS — I10 ESSENTIAL HYPERTENSION: ICD-10-CM

## 2023-01-12 DIAGNOSIS — E11.65 TYPE 2 DIABETES MELLITUS WITH HYPERGLYCEMIA, WITH LONG-TERM CURRENT USE OF INSULIN (HCC): Primary | ICD-10-CM

## 2023-01-12 DIAGNOSIS — J82.83 EOSINOPHILIC ASTHMA: ICD-10-CM

## 2023-01-12 DIAGNOSIS — J45.50 SEVERE PERSISTENT ASTHMA WITHOUT COMPLICATION: ICD-10-CM

## 2023-01-12 DIAGNOSIS — F43.10 PTSD (POST-TRAUMATIC STRESS DISORDER): ICD-10-CM

## 2023-01-12 DIAGNOSIS — Z79.4 TYPE 2 DIABETES MELLITUS WITH HYPERGLYCEMIA, WITH LONG-TERM CURRENT USE OF INSULIN (HCC): Primary | ICD-10-CM

## 2023-01-12 DIAGNOSIS — F33.1 MODERATE EPISODE OF RECURRENT MAJOR DEPRESSIVE DISORDER (HCC): ICD-10-CM

## 2023-01-12 NOTE — PROGRESS NOTES
Leandro Martinez is a 38 y.o. female who was seen by synchronous (real-time) audio-video technology on 1/12/2023 for Diabetes (Follow-up)      Assessment/ Plan:    Diagnoses and all orders for this visit:    1. Type 2 diabetes mellitus with hyperglycemia, with long-term current use of insulin ([facility]  2. Uncontrolled type 2 diabetes mellitus with hyperglycemia ([facility] [address]  3. Diabetes mellitus type 2 in obese [facility])  -Trouble getting Trulicity covered previously so agree ordering this as she should be able to start on this now. If able to get this covered, will plan to decrease insulin. If unable to get started on Trulicity, will increase 70/30 insulin to 45 units in the a.m. and 40 units with dinner in addition to her mealtime insulin  -     dulaglutide (TRULICITY) 9.98 GY/1.0 mL sub-q pen; 0.5 mL by SubCUTAneous route every seven (7) days. -     insulin nph-regular human rec (HUMULIN 70-30) 100 unit/mL (70-30) inpn; Take 45 units in am with breakfast and 40 units in pm with dinner    4. Essential hypertension-uncontrolled in ER recently, planning to follow-up in 2 weeks in office. Continues on HCTZ 12.5 mg, losartan 100 mg, atenolol 50 mg, and amlodipine 10 mg daily    5. Moderate episode of recurrent major depressive disorder ([facility] [address]  6. PTSD (post-traumatic stress disorder)  -Stable on meds    7. Morbid obesity with BMI of 50.0-59.9, adult ([facility] [address]    8. Severe persistent asthma without complication  9. Eosinophilic asthma  -To start [address] today. Would hopefully be able to wean down on steroids with this as well. Knows this may take 3-4 months to show sig improvement. To follow up with Pulm. I spent at least 20 minutes on this visit with this established patient. Follow-up and Dispositions    Return in about 2 weeks (around 1/26/2023).             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 Severe Eosinophilic Asthma - Numerous flares prev - last flare 12/19/22 and seen in [facility] ER. Dupixent to be started tonight. Ct seeing Pulmonology- Dr. Hernandez Seen  She has tried Nucala (Mepolizumab) x 6 months. Unfortunately, she had trouble with missing doses occ because of numerous hospitalizations and trouble getting rescheduled [facility] [place] [address] [facility] (unable to drive due to vision related to corneal edema, hydrops, and keartoconus). Pulm wanted to switch off Nucala and start [address] in 4/2021 but unable to get it processed through specialty pharmacy. [place] [id number] and ordered [address] in 11/2021. PFTs in 11/2020 with FEV1 1.74, FVC 2.05, FEV1/FVC 84%, TLC 4.13, RV 1.20, DLCO 48%  Allergy lab testing in 11/5/2020 with IgE 56 and absolute eosinophils 0.2. Had allergy skin testing in 10/2020 which was positive for mold and weed  Abs Eosinophils 7/13/2020: 0.21. Diabetes Mellitus 2: Sugars running in 250-300s prior to dinner - running high still d/t steroids. Fastings are mostly 150s. Ct on 70/30 insulin 40 units BID + Humalog 30 units with lunch + Glipizide 5 mg in am.  She notes she was not taking insulin at this time and had recently run out of Trulicity. A1c was 7.1% in 6/2022 while not on insulin and on low-dose steroids and his asthma was well controlled.     Lab Results   Component Value Date/Time    Hemoglobin A1c (POC) 8.1 05/05/2022 11:06 AM    Hemoglobin A1c (POC) 6.5 03/30/2016 10:46 AM    Hemoglobin A1c 7.1 (H) 06/01/2022 11:51 AM    Microalb/Creat ratio (ug/mg creat.) 18 02/26/2022 09:21 AM    LDL, calculated 39.8 06/17/2022 04:04 AM    Creatinine 1.25 (H) 12/18/2022 11:57 PM      Lab Results   Component Value Date/Time    GFR est AA >60 06/16/2022 05:43 PM    GFR est non-AA >60 06/16/2022 05:43 PM      Lab Results   Component Value Date/Time    TSH 3.230 02/26/2022 09:21 AM       ROS per HPI    Past Medical History:   Diagnosis Date    Alcohol abuse     Anxiety and depression     Asthma     DM (diabetes mellitus), type 2 ([facility]     Morbid obesity ([facility] [address] [address] 10/28/2014    Stroke [facility])      Past Surgical History:   Procedure Laterality Date    HX GYN      3 C-sections    HX GYN      Miscarriage        Objective:     Patient-Reported Vitals 8/8/2022   Patient-Reported Weight 285 lb   Patient-Reported Pulse 96   Patient-Reported Temperature -   Patient-Reported Systolic  306   Patient-Reported Diastolic 668      Physical exam:  General appearance - alert, well appearing, and in no distress  Eyes -sclera anicteric, no discharge  HEENT- normocephalic, atraumatic, moist mucous membranes, no visualized neck mass  Chest -normal respiratory effort, no visualized signs of respiratory distress, few coughing spasms episodically  Neurological - alert, awake, normal speech, no focal findings or movement disorder noted  Psych - normal mood and affect  Skin- no apparent lesions    We discussed the expected course, resolution and complications of the diagnosis(es) in detail. Medication risks, benefits, costs, interactions, and alternatives were discussed as indicated. I advised her to contact the office if her condition worsens, changes or fails to improve as anticipated. She expressed understanding with the diagnosis(es) and plan. [facility], was evaluated through a synchronous (real-time) audio-video encounter. The patient (or guardian if applicable) is aware that this is a billable service, which includes applicable co-pays. This Virtual Visit was conducted with patient's (and/or legal guardian's) consent. The visit was conducted pursuant to the emergency declaration under the [address] [place] [address], [address] [place] [address] authority and the [employer] General Act. Patient identification was verified, and a caregiver was present when appropriate.   The patient was located at: Home: [place] [id number]  The provider was located at: Home: [unfilled]        Amirah Patterson MD

## 2023-01-12 NOTE — PROGRESS NOTES
Chief Complaint   Patient presents with    Diabetes     Follow-up    1. Have you been to the ER, urgent care clinic since your last visit? Hospitalized since your last visit? Yes Where: [facility]    2. Have you seen or consulted any other health care providers outside of the [address] [place] [address] since your last visit? Include any pap smears or colon screening.  No

## 2023-01-18 DIAGNOSIS — H18.20 CORNEAL EDEMA OF RIGHT EYE: ICD-10-CM

## 2023-01-18 DIAGNOSIS — H18.603 KERATOCONUS OF BOTH EYES: ICD-10-CM

## 2023-01-18 DIAGNOSIS — E11.65 UNCONTROLLED TYPE 2 DIABETES MELLITUS WITH HYPERGLYCEMIA (HCC): ICD-10-CM

## 2023-01-18 DIAGNOSIS — F43.10 PTSD (POST-TRAUMATIC STRESS DISORDER): ICD-10-CM

## 2023-01-18 DIAGNOSIS — F43.21 ADJUSTMENT DISORDER WITH DEPRESSED MOOD: ICD-10-CM

## 2023-01-18 DIAGNOSIS — F33.1 MODERATE EPISODE OF RECURRENT MAJOR DEPRESSIVE DISORDER (HCC): ICD-10-CM

## 2023-01-18 RX ORDER — BLOOD-GLUCOSE SENSOR
EACH MISCELLANEOUS
Qty: 3 EACH | Refills: 1 | Status: SHIPPED | OUTPATIENT
Start: 2023-01-18

## 2023-01-18 RX ORDER — ESCITALOPRAM OXALATE 20 MG/1
20 TABLET ORAL DAILY
Qty: 90 TABLET | Refills: 0 | Status: SHIPPED | OUTPATIENT
Start: 2023-01-18

## 2023-01-18 RX ORDER — BUPROPION HYDROCHLORIDE 150 MG/1
300 TABLET ORAL DAILY
Qty: 180 TABLET | Refills: 0 | Status: SHIPPED | OUTPATIENT
Start: 2023-01-18

## 2023-01-18 RX ORDER — ACETAMINOPHEN AND CODEINE PHOSPHATE 300; 30 MG/1; MG/1
1 TABLET ORAL
Qty: 28 TABLET | Refills: 0 | Status: SHIPPED | OUTPATIENT
Start: 2023-01-18 | End: 2023-01-25

## 2023-03-03 DIAGNOSIS — K21.9 GASTROESOPHAGEAL REFLUX DISEASE, UNSPECIFIED WHETHER ESOPHAGITIS PRESENT: ICD-10-CM

## 2023-03-03 DIAGNOSIS — E11.65 UNCONTROLLED TYPE 2 DIABETES MELLITUS WITH HYPERGLYCEMIA (HCC): ICD-10-CM

## 2023-03-03 DIAGNOSIS — E11.69 DIABETES MELLITUS TYPE 2 IN OBESE (HCC): ICD-10-CM

## 2023-03-03 DIAGNOSIS — E66.9 DIABETES MELLITUS TYPE 2 IN OBESE (HCC): ICD-10-CM

## 2023-03-03 DIAGNOSIS — I10 ESSENTIAL HYPERTENSION: ICD-10-CM

## 2023-03-05 RX ORDER — ATENOLOL 50 MG/1
TABLET ORAL
Qty: 30 TABLET | Refills: 0 | Status: SHIPPED | OUTPATIENT
Start: 2023-03-05

## 2023-03-05 RX ORDER — PANTOPRAZOLE SODIUM 40 MG/1
TABLET, DELAYED RELEASE ORAL
Qty: 30 TABLET | Refills: 0 | Status: SHIPPED | OUTPATIENT
Start: 2023-03-05

## 2023-03-05 RX ORDER — ATORVASTATIN CALCIUM 20 MG/1
TABLET, FILM COATED ORAL
Qty: 30 TABLET | Refills: 0 | Status: SHIPPED | OUTPATIENT
Start: 2023-03-05

## 2023-03-05 RX ORDER — LOSARTAN POTASSIUM 100 MG/1
TABLET ORAL
Qty: 30 TABLET | Refills: 0 | Status: SHIPPED | OUTPATIENT
Start: 2023-03-05

## 2023-03-10 ENCOUNTER — VIRTUAL VISIT (OUTPATIENT)
Dept: FAMILY MEDICINE CLINIC | Age: 43
End: 2023-03-10

## 2023-03-10 DIAGNOSIS — E11.65 UNCONTROLLED TYPE 2 DIABETES MELLITUS WITH HYPERGLYCEMIA (HCC): ICD-10-CM

## 2023-03-10 DIAGNOSIS — E66.9 DIABETES MELLITUS TYPE 2 IN OBESE (HCC): ICD-10-CM

## 2023-03-10 DIAGNOSIS — J82.83 EOSINOPHILIC ASTHMA: ICD-10-CM

## 2023-03-10 DIAGNOSIS — F43.21 ADJUSTMENT DISORDER WITH DEPRESSED MOOD: ICD-10-CM

## 2023-03-10 DIAGNOSIS — J45.51 SEVERE PERSISTENT ASTHMA WITH ACUTE EXACERBATION: Primary | ICD-10-CM

## 2023-03-10 DIAGNOSIS — E11.69 DIABETES MELLITUS TYPE 2 IN OBESE (HCC): ICD-10-CM

## 2023-03-10 RX ORDER — PREDNISONE 20 MG/1
TABLET ORAL
Qty: 13 TABLET | Refills: 0 | Status: SHIPPED | OUTPATIENT
Start: 2023-03-10

## 2023-03-10 RX ORDER — PREDNISONE 20 MG/1
20 TABLET ORAL DAILY
COMMUNITY
Start: 2023-03-01

## 2023-03-10 NOTE — PROGRESS NOTES
Joe Miles is a 41 y.o. female who was seen by synchronous (real-time) audio-video technology on 3/10/2023 for Hypertension (Follow-up)      Assessment/ Plan:   Diagnoses and all orders for this visit:    1. Severe persistent asthma with acute exacerbation  2. Eosinophilic asthma  - Ran out of Dupixent samples. She does have this approved through her insurance but is having trouble getting the medication still. Encouraged her to reach out to pul given her exceptional response to [address] when using it. Ct with Pulmicort BID & Alb nebs currently. Does not think she has Breztri. Adding Prednisone taper today as combo of being out of Dupixent, allergies & weather changes setting off flare. 3. Uncontrolled type 2 diabetes mellitus with hyperglycemia ([facility]  4. Diabetes mellitus type 2 in obese (HCC)  - Sugars much improved off high doses of steroids,     5. Adjustment disorder with depressed mood- much worse related to asthma issues and life circumstances with her corneal issues limiting her driving and her partner having a major medical issue (he suffered an anoxic brain injury after neurosurgery intervention and is unable to care for himself)    I spent at least 30 minutes on this visit with this established patient. Follow-up and Dispositions    Return in about 1 week (around 3/17/2023), or if symptoms worsen or fail to improve. Subjective:     42 yo AAF with PMH sig for severe eosinophilic asthma, DM2, HTN, TIA, seasonal and environmental allergies, visual difficulties (from hydrops, corneal edema, and keratoconus), and obesity who presents for URI sx. Asthma- sx 4 day, neg COVID, on pred 20 mg daily. No f/c, productive. Severe Eosinophilic Asthma - Did phenomenal with Dupixent - able to get samples for 4 weeks. Med has been approved by insurance but having some issues with getting the physical medication to her. Limited with driving d/t her corneal issues still. .    Prior to Dupixent, had numerous flares. Ct seeing Pulmonology- Dr. Deb Watson. She has tried Nucala (Mepolizumab) x 6 months. Unfortunately, she had trouble with missing doses occ because of numerous hospitalizations and trouble getting rescheduled partly d/[facility] [address] [facility] (unable to drive due to vision related to corneal edema, hydrops, and keartoconus). Pulm wanted to switch off Nucala and start [address] in 4/2021 but unable to get it processed through specialty pharmacy. Reviewed notes from [place] [address] and ordered [address] in 11/2021. PFTs in 11/2020 with FEV1 1.74, FVC 2.05, FEV1/FVC 84%, TLC 4.13, RV 1.20, DLCO 48%  Allergy lab testing in 11/5/2020 with IgE 56 and absolute eosinophils 0.2. Had allergy skin testing in 10/2020 which was positive for mold and weed  Abs Eosinophils 7/13/2020: 0.21. Diabetes Mellitus 2: Sugars much improved with lower doses of steroids as asthma control improved with Dupixent. Fastings are mostly 150s. Ct on 70/30 insulin 45 units in am and 40 units in pm + Humalog 30 units with lunch + Glipizide 5 mg in am and Trulicity 9.80 mg weekly. A1c was 7.1% in 6/2022 while not on insulin and on low-dose steroids and his asthma was well controlled.     Lab Results   Component Value Date/Time    Hemoglobin A1c (POC) 8.1 05/05/2022 11:06 AM    Hemoglobin A1c (POC) 6.5 03/30/2016 10:46 AM    Hemoglobin A1c 7.1 (H) 06/01/2022 11:51 AM    Microalb/Creat ratio (ug/mg creat.) 18 02/26/2022 09:21 AM    LDL, calculated 39.8 06/17/2022 04:04 AM    Creatinine 1.25 (H) 12/18/2022 11:57 PM      Lab Results   Component Value Date/Time    GFR est AA >60 06/16/2022 05:43 PM    GFR est non-AA >60 06/16/2022 05:43 PM      Lab Results   Component Value Date/Time    TSH 3.230 02/26/2022 09:21 AM       ROS per HPI    Past Medical History:   Diagnosis Date    Alcohol abuse     Anxiety and depression     Asthma     DM (diabetes mellitus), type 2 ([facility]     Morbid obesity ([facility] 10/28/2014    Stroke ([facility]      Past Surgical History:   Procedure Laterality Date    HX GYN      3 C-sections    HX GYN      Miscarriage       Objective:     Patient-Reported Vitals 8/8/2022   Patient-Reported Weight 285 lb   Patient-Reported Pulse 96   Patient-Reported Temperature -   Patient-Reported Systolic  921   Patient-Reported Diastolic 139        Physical exam:  General appearance - alert, well appearing, and in no distress  Eyes -sclera anicteric, no discharge  HEENT- normocephalic, atraumatic, moist mucous membranes, no visualized neck mass  Chest -normal respiratory effort, no visualized signs of respiratory distress. +Spastic coughing and hoarseness  Neurological - alert, awake, normal speech, no focal findings or movement disorder noted  Psych - normal mood and affect but tearful  Skin- no apparent lesions    We discussed the expected course, resolution and complications of the diagnosis(es) in detail. Medication risks, benefits, costs, interactions, and alternatives were discussed as indicated. I advised her to contact the office if her condition worsens, changes or fails to improve as anticipated. She expressed understanding with the diagnosis(es) and plan. [facility], was evaluated through a synchronous (real-time) audio-video encounter. The patient (or guardian if applicable) is aware that this is a billable service, which includes applicable co-pays. This Virtual Visit was conducted with patient's (and/or legal guardian's) consent. The visit was conducted pursuant to the emergency declaration under the [facility], [address] [place] [address] authority and the [employer] and [email or web address] General Act. Patient identification was verified, and a caregiver was present when appropriate. The patient was located at: Home: [place]  [id number]  The provider was located at:  Facility (Appt Department): [id number] [facility] [address] Kristine Malcolm MD

## 2023-03-10 NOTE — PROGRESS NOTES
Chief Complaint   Patient presents with    Hypertension     Follow-up    1. Have you been to the ER, urgent care clinic since your last visit? Hospitalized since your last visit? No    2. Have you seen or consulted any other health care providers outside of the [address] [place] [address] since your last visit? Include any pap smears or colon screening.  No

## 2023-03-30 DIAGNOSIS — H18.20 CORNEAL EDEMA OF RIGHT EYE: ICD-10-CM

## 2023-03-30 DIAGNOSIS — H18.603 KERATOCONUS OF BOTH EYES: ICD-10-CM

## 2023-03-30 DIAGNOSIS — I10 ESSENTIAL HYPERTENSION: ICD-10-CM

## 2023-03-31 RX ORDER — ACETAMINOPHEN AND CODEINE PHOSPHATE 300; 30 MG/1; MG/1
1 TABLET ORAL
Qty: 28 TABLET | Refills: 0 | Status: SHIPPED | OUTPATIENT
Start: 2023-03-31 | End: 2023-04-07

## 2023-03-31 RX ORDER — AMLODIPINE BESYLATE 10 MG/1
10 TABLET ORAL DAILY
Qty: 90 TABLET | Refills: 0 | Status: SHIPPED | OUTPATIENT
Start: 2023-03-31

## 2023-04-05 RX ORDER — ATORVASTATIN CALCIUM 20 MG/1
TABLET, FILM COATED ORAL
Qty: 30 TABLET | Refills: 0
Start: 2023-04-05

## 2023-04-05 RX ORDER — ATENOLOL 50 MG/1
TABLET ORAL
Qty: 30 TABLET | Refills: 0
Start: 2023-04-05

## 2023-04-05 RX ORDER — LOSARTAN POTASSIUM 100 MG/1
TABLET ORAL
Qty: 30 TABLET | Refills: 0
Start: 2023-04-05

## 2023-04-05 RX ORDER — PANTOPRAZOLE SODIUM 40 MG/1
TABLET, DELAYED RELEASE ORAL
Qty: 30 TABLET | Refills: 0
Start: 2023-04-05

## 2023-04-24 ENCOUNTER — VIRTUAL VISIT (OUTPATIENT)
Dept: FAMILY MEDICINE CLINIC | Age: 43
End: 2023-04-24
Payer: MEDICAID

## 2023-04-24 DIAGNOSIS — E11.649 UNCONTROLLED TYPE 2 DIABETES MELLITUS WITH HYPOGLYCEMIA WITHOUT COMA (HCC): Primary | ICD-10-CM

## 2023-04-24 DIAGNOSIS — K21.9 GASTROESOPHAGEAL REFLUX DISEASE, UNSPECIFIED WHETHER ESOPHAGITIS PRESENT: ICD-10-CM

## 2023-04-24 DIAGNOSIS — Z79.4 TYPE 2 DIABETES MELLITUS WITH HYPERGLYCEMIA, WITH LONG-TERM CURRENT USE OF INSULIN (HCC): ICD-10-CM

## 2023-04-24 DIAGNOSIS — F33.2 SEVERE EPISODE OF RECURRENT MAJOR DEPRESSIVE DISORDER, WITHOUT PSYCHOTIC FEATURES (HCC): ICD-10-CM

## 2023-04-24 DIAGNOSIS — E11.65 TYPE 2 DIABETES MELLITUS WITH HYPERGLYCEMIA, WITH LONG-TERM CURRENT USE OF INSULIN (HCC): ICD-10-CM

## 2023-04-24 DIAGNOSIS — F43.10 PTSD (POST-TRAUMATIC STRESS DISORDER): ICD-10-CM

## 2023-04-24 DIAGNOSIS — E11.69 DIABETES MELLITUS TYPE 2 IN OBESE (HCC): ICD-10-CM

## 2023-04-24 DIAGNOSIS — F43.21 ADJUSTMENT DISORDER WITH DEPRESSED MOOD: ICD-10-CM

## 2023-04-24 DIAGNOSIS — R25.2 LEG CRAMPS: ICD-10-CM

## 2023-04-24 DIAGNOSIS — I10 PRIMARY HYPERTENSION: ICD-10-CM

## 2023-04-24 DIAGNOSIS — E66.9 DIABETES MELLITUS TYPE 2 IN OBESE (HCC): ICD-10-CM

## 2023-04-24 DIAGNOSIS — J45.50 SEVERE PERSISTENT ASTHMA WITHOUT COMPLICATION: ICD-10-CM

## 2023-04-24 PROCEDURE — 99214 OFFICE O/P EST MOD 30 MIN: CPT | Performed by: FAMILY MEDICINE

## 2023-04-24 RX ORDER — BLOOD-GLUCOSE SENSOR
EACH MISCELLANEOUS
Qty: 3 EACH | Refills: 1 | Status: SHIPPED | OUTPATIENT
Start: 2023-04-24

## 2023-04-24 RX ORDER — LANOLIN ALCOHOL/MO/W.PET/CERES
400 CREAM (GRAM) TOPICAL DAILY
Qty: 90 TABLET | Refills: 1 | Status: SHIPPED | OUTPATIENT
Start: 2023-04-24

## 2023-04-24 RX ORDER — ATENOLOL 50 MG/1
TABLET ORAL
Qty: 90 TABLET | Refills: 0 | Status: SHIPPED | OUTPATIENT
Start: 2023-04-24

## 2023-04-24 RX ORDER — ESCITALOPRAM OXALATE 20 MG/1
20 TABLET ORAL DAILY
Qty: 90 TABLET | Refills: 0 | Status: SHIPPED | OUTPATIENT
Start: 2023-04-24

## 2023-04-24 RX ORDER — MIRTAZAPINE 15 MG/1
15 TABLET, FILM COATED ORAL
Qty: 90 TABLET | Refills: 0 | Status: CANCELLED | OUTPATIENT
Start: 2023-04-24

## 2023-04-24 RX ORDER — BUDESONIDE, GLYCOPYRROLATE, AND FORMOTEROL FUMARATE 160; 9; 4.8 UG/1; UG/1; UG/1
1 AEROSOL, METERED RESPIRATORY (INHALATION) 2 TIMES DAILY
Qty: 3 EACH | Refills: 1 | Status: SHIPPED | OUTPATIENT
Start: 2023-04-24

## 2023-04-24 RX ORDER — HYDROCHLOROTHIAZIDE 12.5 MG/1
12.5 TABLET ORAL DAILY
Qty: 90 TABLET | Refills: 1 | Status: SHIPPED | OUTPATIENT
Start: 2023-04-24

## 2023-04-24 RX ORDER — GLIPIZIDE 5 MG/1
5 TABLET ORAL
Qty: 90 TABLET | Refills: 1 | Status: SHIPPED | OUTPATIENT
Start: 2023-04-24

## 2023-04-24 RX ORDER — BUPROPION HYDROCHLORIDE 450 MG/1
450 TABLET, FILM COATED, EXTENDED RELEASE ORAL DAILY
Qty: 90 TABLET | Refills: 0 | Status: SHIPPED | OUTPATIENT
Start: 2023-04-24

## 2023-04-24 RX ORDER — ALBUTEROL SULFATE 90 UG/1
2 AEROSOL, METERED RESPIRATORY (INHALATION)
Qty: 3 EACH | Refills: 1 | Status: SHIPPED | OUTPATIENT
Start: 2023-04-24

## 2023-04-24 RX ORDER — PANTOPRAZOLE SODIUM 40 MG/1
40 TABLET, DELAYED RELEASE ORAL DAILY
Qty: 90 TABLET | Refills: 0 | Status: SHIPPED | OUTPATIENT
Start: 2023-04-24

## 2023-04-24 RX ORDER — ATORVASTATIN CALCIUM 20 MG/1
20 TABLET, FILM COATED ORAL DAILY
Qty: 90 TABLET | Refills: 0 | Status: SHIPPED | OUTPATIENT
Start: 2023-04-24

## 2023-04-24 RX ORDER — AMLODIPINE BESYLATE 10 MG/1
10 TABLET ORAL DAILY
Qty: 90 TABLET | Refills: 0 | Status: SHIPPED | OUTPATIENT
Start: 2023-04-24

## 2023-04-24 RX ORDER — RISPERIDONE 0.5 MG/1
0.25 TABLET, FILM COATED ORAL 2 TIMES DAILY
Qty: 90 TABLET | Refills: 0 | Status: SHIPPED | OUTPATIENT
Start: 2023-04-24

## 2023-04-24 RX ORDER — LOSARTAN POTASSIUM 100 MG/1
TABLET ORAL
Qty: 90 TABLET | Refills: 0 | Status: SHIPPED | OUTPATIENT
Start: 2023-04-24

## 2023-04-24 RX ORDER — BLOOD-GLUCOSE TRANSMITTER
EACH MISCELLANEOUS
Qty: 1 EACH | Refills: 1 | Status: SHIPPED | OUTPATIENT
Start: 2023-04-24

## 2023-04-24 RX ORDER — DULAGLUTIDE 0.75 MG/.5ML
INJECTION, SOLUTION SUBCUTANEOUS
Qty: 6 ML | Refills: 0 | Status: CANCELLED | OUTPATIENT
Start: 2023-04-24

## 2023-04-24 NOTE — PROGRESS NOTES
Jeanmarie Morillo is a 41 y.o. female who was seen by synchronous (real-time) audio-video technology on 4/24/2023 for Diabetes (Follow-up)        Assessment/ Plan:   Diagnoses and all orders for this visit:    1. Severe persistent asthma with acute exacerbation  2. Eosinophilic asthma  - Working with Pulm  Ct with Pulmicort BID & Alb nebs currently. 3. Uncontrolled type 2 diabetes mellitus with hyperglycemia ([facility]  4. Diabetes mellitus type 2 in obese (HCC)  - Sugars much improved off high doses of steroids,     5. Adjustment disorder with depressed mood- much worse related to asthma issues and life circumstances with her corneal issues limiting her driving and her partner having a major medical issue (he suffered an anoxic brain injury after neurosurgery intervention and is unable to care for himself)  Diagnoses and all orders for this visit:    1. Uncontrolled type 2 diabetes mellitus with hypoglycemia without coma ([facility]  2. Type 2 diabetes mellitus with hyperglycemia, with long-term current use of insulin ([facility])  3. Diabetes mellitus type 2 in obese (HCC)  - decr 70/30 given hypoglycemia, ct glipizide, incr Trulicity. To start working on diet and exercise after starting meds for   -     losartan (COZAAR) 100 mg tablet; TAKE 1 TABLET BY MOUTH ONCE DAILY FOR HIGH BLOOD PRESSURE  -     insulin nph-regular human rec (HUMULIN 70-30) 100 unit/mL (70-30) inpn; 30 Units by SubCUTAneous route two (2) times daily (with meals) for 90 days.  -     glipiZIDE (GLUCOTROL) 5 mg tablet; Take 1 Tablet by mouth Daily (before dinner). -     Blood-Glucose Transmitter (Dexcom G6 Transmitter) ta; USE TO CHECK SUGARS 5 TO 6 TIMES A DAY  -     Blood-Glucose Sensor (Dexcom G6 Sensor) ta; Check sugars 5-6 x per day. Needs filled early due to problem with sensor recently  -     atorvastatin (LIPITOR) 20 mg tablet; Take 1 Tablet by mouth daily.   -     dulaglutide (TRULICITY) 1.5 JK/3.1 mL sub-q pen; 0.5 mL by SubCUTAneous route every seven (7) days. 4. Primary hypertension - refilling meds, checking bp in office at next appt  -     losartan (COZAAR) 100 mg tablet; TAKE 1 TABLET BY MOUTH ONCE DAILY FOR HIGH BLOOD PRESSURE  -     hydroCHLOROthiazide (HYDRODIURIL) 12.5 mg tablet; Take 1 Tablet by mouth daily. -     amLODIPine (NORVASC) 10 mg tablet; Take 1 Tablet by mouth daily. Indications: high blood pressure  -     atenoloL (TENORMIN) 50 mg tablet; TAKE 1 TABLET BY MOUTH ONCE DAILY-REPLACES LABETOLOL    5. Severe persistent asthma without complication  -     budesonide-glycopyr-formoterol (Breztri Aerosphere) 160-9-4.8 mcg/actuation HFAA; Take 1 Spray by inhalation two (2) times a day. Take  by inhalation two (2) times a day. -     albuterol (PROVENTIL HFA, VENTOLIN HFA, PROAIR HFA) 90 mcg/actuation inhaler; Take 2 Puffs by inhalation every four (4) hours as needed for Wheezing.  -     risperiDONE (RisperDAL) 0.5 mg tablet; Take 0.5 Tablets by mouth two (2) times a day. -     REFERRAL TO SOCIAL WORK  -     REFERRAL TO PSYCHIATRY    6. Severe episode of recurrent major depressive disorder, without psychotic features ([facility]  -     escitalopram oxalate (LEXAPRO) 20 mg tablet; Take 1 Tablet by mouth daily. -     buPROPion XL (FORFIVO) 450 mg Tb24 XL tablet; Take 1 Tablet by mouth daily. -     risperiDONE (RisperDAL) 0.5 mg tablet; Take 0.5 Tablets by mouth two (2) times a day. 7. Adjustment disorder with depressed mood  -     escitalopram oxalate (LEXAPRO) 20 mg tablet; Take 1 Tablet by mouth daily. -     buPROPion XL (FORFIVO) 450 mg Tb24 XL tablet; Take 1 Tablet by mouth daily. -     risperiDONE (RisperDAL) 0.5 mg tablet; Take 0.5 Tablets by mouth two (2) times a day. 8. PTSD (post-traumatic stress disorder)  -     buPROPion XL (FORFIVO) 450 mg Tb24 XL tablet; Take 1 Tablet by mouth daily. -     risperiDONE (RisperDAL) 0.5 mg tablet; Take 0.5 Tablets by mouth two (2) times a day.     9. Leg cramps  -     magnesium oxide (MAG-OX) 400 mg tablet; Take 1 Tablet by mouth daily. 10. Gastroesophageal reflux disease, unspecified whether esophagitis present  -     pantoprazole (PROTONIX) 40 mg tablet; Take 1 Tablet by mouth daily. I spent at least 30 minutes on this visit with this established patient. Follow-up and Dispositions    Return in about 4 weeks (around 5/22/2023). Subjective:     42 yo AAF with PMH sig for severe eosinophilic asthma, DM2, HTN, TIA, seasonal and environmental allergies, visual difficulties (from hydrops, corneal edema, and keratoconus), and obesity who presents for routine follow up on chronic medical conditions. Since last appt. approved for Dupixent    Depression - struggling. Did not leave her bed all day on Saturday. Has a lot of personal things going on - not in a good place with her kids father, she & her kids are displaced and living with her mom. Just got disability approved but worried about cost.  Unable to work and frustrated   No recent SI or SA. Does feel like it would be easier to be dead. Did some therapy but working to find another therapist.    Severe Eosinophilic Asthma - Much better when on Dupixent. Just got approved for Dupixent today    Prior to [address], had numerous flares. Ct seeing Pulmonology- Dr. Noreen Harada. She has tried Nucala (Mepolizumab) x 6 months. Unfortunately, she had trouble with missing doses occ because of numerous hospitalizations and trouble getting rescheduled partly d/t rides to the [facility]  office (unable to drive due to vision related to corneal edema, hydrops, and keartoconus). Pulm wanted to switch off Nucala and start [address] in 4/2021 but unable to get it processed through specialty pharmacy. Reviewed notes from [place] [id number] and ordered [address] in 11/2021. PFTs in 11/2020 with FEV1 1.74, FVC 2.05, FEV1/FVC 84%, TLC 4.13, RV 1.20, DLCO 48%  Allergy lab testing in 11/5/2020 with IgE 56 and absolute eosinophils 0.2.   Had allergy skin testing in 10/2020 which was positive for mold and weed  Abs Eosinophils 7/13/2020: 0.21. Diabetes Mellitus 2: Fastings are mostly 80-100s. Ct on 70/30 insulin 45 units in am and 40 units in pm + Glipizide 5 mg in am and Trulicity 2.67 mg weekly. Had a few lows in 70-80s and 1 time where she woke up with it saying low. A1c was 7.1% in 6/2022 while not on insulin and on low-dose steroids and his asthma was well controlled. Lab Results   Component Value Date/Time    Hemoglobin A1c (POC) 8.1 05/05/2022 11:06 AM    Hemoglobin A1c (POC) 6.5 03/30/2016 10:46 AM    Hemoglobin A1c 7.1 (H) 06/01/2022 11:51 AM    Microalb/Creat ratio (ug/mg creat.) 18 02/26/2022 09:21 AM    LDL, calculated 39.8 06/17/2022 04:04 AM    Creatinine 1.25 (H) 12/18/2022 11:57 PM      Lab Results   Component Value Date/Time    GFR est AA >60 06/16/2022 05:43 PM    GFR est non-AA >60 06/16/2022 05:43 PM      Lab Results   Component Value Date/Time    TSH 3.230 02/26/2022 09:21 AM       ROS per HPI    Past Medical History:   Diagnosis Date    Alcohol abuse     Anxiety and depression     Asthma     DM (diabetes mellitus), type 2 ([facility]     Morbid obesity ([facility] 10/28/2014    Stroke ([facility]      Past Surgical History:   Procedure Laterality Date    HX GYN      3 C-sections    HX GYN      Miscarriage       Objective:     Patient-Reported Vitals 8/8/2022   Patient-Reported Weight 285 lb   Patient-Reported Pulse 96   Patient-Reported Temperature -   Patient-Reported Systolic  800   Patient-Reported Diastolic 233      Physical exam:  General appearance - alert, well appearing, and in no distress  Eyes -sclera anicteric, no discharge  HEENT- normocephalic, atraumatic, moist mucous membranes, no visualized neck mass  Chest -normal respiratory effort, no visualized signs of respiratory distress.    Neurological - alert, awake, normal speech, no focal findings or movement disorder noted  Psych - depressed  Skin- no apparent lesions    We discussed the expected course, resolution and complications of the diagnosis(es) in detail. Medication risks, benefits, costs, interactions, and alternatives were discussed as indicated. I advised her to contact the office if her condition worsens, changes or fails to improve as anticipated. She expressed understanding with the diagnosis(es) and plan. [facility], was evaluated through a synchronous (real-time) audio-video encounter. The patient (or guardian if applicable) is aware that this is a billable service, which includes applicable co-pays. This Virtual Visit was conducted with patient's (and/or legal guardian's) consent. The visit was conducted pursuant to the emergency declaration under the [address] [place] [address], [address] [place] [address] authority and the [employer] and [employer] General Act. Patient identification was verified, and a caregiver was present when appropriate. The patient was located at: Home: [address] [place] [id number]  The provider was located at:  [facility] [place] [address]        Lillie Ortiz MD

## 2023-04-24 NOTE — PROGRESS NOTES
Chief Complaint   Patient presents with    Diabetes     Follow-up    1. Have you been to the ER, urgent care clinic since your last visit? Hospitalized since your last visit? No    2. Have you seen or consulted any other health care providers outside of the [address] [place] [address] since your last visit? Include any pap smears or colon screening.  No     BP on 4/14/23 @ [facility] 102/59

## 2023-05-10 RX ORDER — PANTOPRAZOLE SODIUM 40 MG/1
TABLET, DELAYED RELEASE ORAL
Qty: 30 TABLET | Refills: 0 | Status: SHIPPED | OUTPATIENT
Start: 2023-05-10

## 2023-05-10 RX ORDER — ATORVASTATIN CALCIUM 20 MG/1
TABLET, FILM COATED ORAL
Qty: 30 TABLET | Refills: 0 | Status: SHIPPED | OUTPATIENT
Start: 2023-05-10

## 2023-06-21 RX ORDER — MONTELUKAST SODIUM 10 MG/1
10 TABLET ORAL DAILY
Qty: 90 TABLET | Refills: 0 | Status: SHIPPED | OUTPATIENT
Start: 2023-06-21

## 2023-06-21 RX ORDER — BUPROPION HYDROCHLORIDE 150 MG/1
300 TABLET ORAL DAILY
Qty: 180 TABLET | Refills: 0 | Status: SHIPPED | OUTPATIENT
Start: 2023-06-21

## 2023-06-21 RX ORDER — ALBUTEROL SULFATE 90 UG/1
2 AEROSOL, METERED RESPIRATORY (INHALATION) EVERY 4 HOURS PRN
Qty: 3 EACH | Refills: 0 | Status: SHIPPED | OUTPATIENT
Start: 2023-06-21

## 2023-06-21 RX ORDER — MIRTAZAPINE 15 MG/1
15 TABLET, FILM COATED ORAL NIGHTLY
Qty: 90 TABLET | Refills: 0 | Status: SHIPPED | OUTPATIENT
Start: 2023-06-21

## 2023-06-21 RX ORDER — PANTOPRAZOLE SODIUM 40 MG/1
40 TABLET, DELAYED RELEASE ORAL DAILY
Qty: 90 TABLET | Refills: 0 | Status: SHIPPED | OUTPATIENT
Start: 2023-06-21

## 2023-06-21 RX ORDER — INSULIN HUMAN 100 [IU]/ML
INJECTION, SUSPENSION SUBCUTANEOUS
Qty: 5 ADJUSTABLE DOSE PRE-FILLED PEN SYRINGE | Refills: 0 | Status: SHIPPED | OUTPATIENT
Start: 2023-06-21

## 2023-06-21 RX ORDER — RISPERIDONE 0.5 MG/1
0.5 TABLET ORAL 2 TIMES DAILY
Qty: 180 TABLET | Refills: 0 | Status: SHIPPED | OUTPATIENT
Start: 2023-06-21

## 2023-06-21 RX ORDER — DULAGLUTIDE 0.75 MG/.5ML
INJECTION, SOLUTION SUBCUTANEOUS
Qty: 12 ADJUSTABLE DOSE PRE-FILLED PEN SYRINGE | Refills: 0 | Status: SHIPPED | OUTPATIENT
Start: 2023-06-21

## 2023-06-21 RX ORDER — ATORVASTATIN CALCIUM 20 MG/1
20 TABLET, FILM COATED ORAL DAILY
Qty: 90 TABLET | Refills: 0 | Status: SHIPPED | OUTPATIENT
Start: 2023-06-21

## 2023-06-21 RX ORDER — ATENOLOL 50 MG/1
TABLET ORAL
Qty: 90 TABLET | Refills: 0 | Status: SHIPPED | OUTPATIENT
Start: 2023-06-21

## 2023-06-21 RX ORDER — GLIPIZIDE 5 MG/1
5 TABLET ORAL
Qty: 180 TABLET | Refills: 0 | Status: SHIPPED | OUTPATIENT
Start: 2023-06-21

## 2023-06-21 RX ORDER — ESCITALOPRAM OXALATE 20 MG/1
20 TABLET ORAL DAILY
Qty: 90 TABLET | Refills: 0 | Status: SHIPPED | OUTPATIENT
Start: 2023-06-21

## 2023-06-21 RX ORDER — MAGNESIUM OXIDE 400 MG/1
400 TABLET ORAL DAILY
Qty: 90 TABLET | Refills: 0 | Status: SHIPPED | OUTPATIENT
Start: 2023-06-21

## 2023-06-21 RX ORDER — AMLODIPINE BESYLATE 10 MG/1
10 TABLET ORAL DAILY
Qty: 90 TABLET | Refills: 0 | Status: SHIPPED | OUTPATIENT
Start: 2023-06-21

## 2023-06-21 RX ORDER — LOSARTAN POTASSIUM 100 MG/1
TABLET ORAL
Qty: 90 TABLET | Refills: 0 | Status: SHIPPED | OUTPATIENT
Start: 2023-06-21

## 2023-06-21 RX ORDER — HYDROCHLOROTHIAZIDE 12.5 MG/1
12.5 TABLET ORAL DAILY
Qty: 90 TABLET | Refills: 0 | Status: SHIPPED | OUTPATIENT
Start: 2023-06-21

## 2023-06-26 ENCOUNTER — TELEMEDICINE (OUTPATIENT)
Age: 43
End: 2023-06-26
Payer: MEDICAID

## 2023-06-26 DIAGNOSIS — J45.50 SEVERE PERSISTENT ASTHMA, UNCOMPLICATED: ICD-10-CM

## 2023-06-26 DIAGNOSIS — R29.818 SUSPECTED SLEEP APNEA: ICD-10-CM

## 2023-06-26 DIAGNOSIS — Z11.4 SCREENING FOR HIV WITHOUT PRESENCE OF RISK FACTORS: ICD-10-CM

## 2023-06-26 DIAGNOSIS — F33.2 SEVERE EPISODE OF RECURRENT MAJOR DEPRESSIVE DISORDER, WITHOUT PSYCHOTIC FEATURES (HCC): Primary | ICD-10-CM

## 2023-06-26 DIAGNOSIS — Z79.4 TYPE 2 DIABETES MELLITUS WITH HYPOGLYCEMIA WITHOUT COMA, WITH LONG-TERM CURRENT USE OF INSULIN (HCC): ICD-10-CM

## 2023-06-26 DIAGNOSIS — E11.65 TYPE 2 DIABETES MELLITUS WITH HYPERGLYCEMIA, WITH LONG-TERM CURRENT USE OF INSULIN (HCC): ICD-10-CM

## 2023-06-26 DIAGNOSIS — F43.21 ADJUSTMENT DISORDER WITH DEPRESSED MOOD: ICD-10-CM

## 2023-06-26 DIAGNOSIS — Z79.899 ENCOUNTER FOR LONG-TERM (CURRENT) USE OF MEDICATIONS: ICD-10-CM

## 2023-06-26 DIAGNOSIS — F43.10 PTSD (POST-TRAUMATIC STRESS DISORDER): ICD-10-CM

## 2023-06-26 DIAGNOSIS — Z79.4 TYPE 2 DIABETES MELLITUS WITH HYPERGLYCEMIA, WITH LONG-TERM CURRENT USE OF INSULIN (HCC): ICD-10-CM

## 2023-06-26 DIAGNOSIS — E11.649 TYPE 2 DIABETES MELLITUS WITH HYPOGLYCEMIA WITHOUT COMA, WITH LONG-TERM CURRENT USE OF INSULIN (HCC): ICD-10-CM

## 2023-06-26 PROCEDURE — 99214 OFFICE O/P EST MOD 30 MIN: CPT | Performed by: FAMILY MEDICINE

## 2023-06-26 RX ORDER — INSULIN HUMAN 100 [IU]/ML
INJECTION, SUSPENSION SUBCUTANEOUS
Qty: 5 ADJUSTABLE DOSE PRE-FILLED PEN SYRINGE | Refills: 0
Start: 2023-06-26

## 2023-06-26 RX ORDER — AMLODIPINE BESYLATE AND ATORVASTATIN CALCIUM 10; 10 MG/1; MG/1
1 TABLET, FILM COATED ORAL
COMMUNITY
End: 2023-06-26 | Stop reason: SDUPTHER

## 2023-06-26 RX ORDER — RISPERIDONE 1 MG/1
1 TABLET ORAL 2 TIMES DAILY
Qty: 180 TABLET | Refills: 0 | Status: SHIPPED | OUTPATIENT
Start: 2023-06-26

## 2023-06-26 RX ORDER — CALCIUM CARBONATE 300MG(750)
TABLET,CHEWABLE ORAL
COMMUNITY
Start: 2023-06-21 | End: 2023-06-26 | Stop reason: SDUPTHER

## 2023-06-26 RX ORDER — BLOOD-GLUCOSE SENSOR
EACH MISCELLANEOUS
Qty: 3 EACH | Refills: 5 | Status: SHIPPED | OUTPATIENT
Start: 2023-06-26

## 2023-06-26 RX ORDER — DULAGLUTIDE 1.5 MG/.5ML
1.5 INJECTION, SOLUTION SUBCUTANEOUS WEEKLY
Qty: 6 ML | Refills: 1 | Status: SHIPPED | OUTPATIENT
Start: 2023-06-26

## 2023-06-26 SDOH — ECONOMIC STABILITY: HOUSING INSECURITY
IN THE LAST 12 MONTHS, WAS THERE A TIME WHEN YOU DID NOT HAVE A STEADY PLACE TO SLEEP OR SLEPT IN A SHELTER (INCLUDING NOW)?: NO

## 2023-06-26 SDOH — ECONOMIC STABILITY: INCOME INSECURITY: HOW HARD IS IT FOR YOU TO PAY FOR THE VERY BASICS LIKE FOOD, HOUSING, MEDICAL CARE, AND HEATING?: SOMEWHAT HARD

## 2023-06-26 SDOH — ECONOMIC STABILITY: FOOD INSECURITY: WITHIN THE PAST 12 MONTHS, YOU WORRIED THAT YOUR FOOD WOULD RUN OUT BEFORE YOU GOT MONEY TO BUY MORE.: SOMETIMES TRUE

## 2023-06-26 SDOH — ECONOMIC STABILITY: FOOD INSECURITY: WITHIN THE PAST 12 MONTHS, THE FOOD YOU BOUGHT JUST DIDN'T LAST AND YOU DIDN'T HAVE MONEY TO GET MORE.: SOMETIMES TRUE

## 2023-06-26 ASSESSMENT — PATIENT HEALTH QUESTIONNAIRE - PHQ9
5. POOR APPETITE OR OVEREATING: 0
SUM OF ALL RESPONSES TO PHQ9 QUESTIONS 1 & 2: 2
6. FEELING BAD ABOUT YOURSELF - OR THAT YOU ARE A FAILURE OR HAVE LET YOURSELF OR YOUR FAMILY DOWN: 1
SUM OF ALL RESPONSES TO PHQ9 QUESTIONS 1 & 2: 2
1. LITTLE INTEREST OR PLEASURE IN DOING THINGS: 1
SUM OF ALL RESPONSES TO PHQ QUESTIONS 1-9: 5
9. THOUGHTS THAT YOU WOULD BE BETTER OFF DEAD, OR OF HURTING YOURSELF: 0
SUM OF ALL RESPONSES TO PHQ QUESTIONS 1-9: 5
SUM OF ALL RESPONSES TO PHQ QUESTIONS 1-9: 5
1. LITTLE INTEREST OR PLEASURE IN DOING THINGS: 1
SUM OF ALL RESPONSES TO PHQ QUESTIONS 1-9: 5
8. MOVING OR SPEAKING SO SLOWLY THAT OTHER PEOPLE COULD HAVE NOTICED. OR THE OPPOSITE, BEING SO FIGETY OR RESTLESS THAT YOU HAVE BEEN MOVING AROUND A LOT MORE THAN USUAL: 0
SUM OF ALL RESPONSES TO PHQ QUESTIONS 1-9: 5
10. IF YOU CHECKED OFF ANY PROBLEMS, HOW DIFFICULT HAVE THESE PROBLEMS MADE IT FOR YOU TO DO YOUR WORK, TAKE CARE OF THINGS AT HOME, OR GET ALONG WITH OTHER PEOPLE: 1
2. FEELING DOWN, DEPRESSED OR HOPELESS: 1
8. MOVING OR SPEAKING SO SLOWLY THAT OTHER PEOPLE COULD HAVE NOTICED. OR THE OPPOSITE, BEING SO FIGETY OR RESTLESS THAT YOU HAVE BEEN MOVING AROUND A LOT MORE THAN USUAL: 0
2. FEELING DOWN, DEPRESSED OR HOPELESS: 1
4. FEELING TIRED OR HAVING LITTLE ENERGY: 1
SUM OF ALL RESPONSES TO PHQ QUESTIONS 1-9: 5
5. POOR APPETITE OR OVEREATING: 0
9. THOUGHTS THAT YOU WOULD BE BETTER OFF DEAD, OR OF HURTING YOURSELF: 0
10. IF YOU CHECKED OFF ANY PROBLEMS, HOW DIFFICULT HAVE THESE PROBLEMS MADE IT FOR YOU TO DO YOUR WORK, TAKE CARE OF THINGS AT HOME, OR GET ALONG WITH OTHER PEOPLE: 1
7. TROUBLE CONCENTRATING ON THINGS, SUCH AS READING THE NEWSPAPER OR WATCHING TELEVISION: 1
4. FEELING TIRED OR HAVING LITTLE ENERGY: 1
3. TROUBLE FALLING OR STAYING ASLEEP: 0
7. TROUBLE CONCENTRATING ON THINGS, SUCH AS READING THE NEWSPAPER OR WATCHING TELEVISION: 1
SUM OF ALL RESPONSES TO PHQ QUESTIONS 1-9: 5
3. TROUBLE FALLING OR STAYING ASLEEP: 0
SUM OF ALL RESPONSES TO PHQ QUESTIONS 1-9: 5
6. FEELING BAD ABOUT YOURSELF - OR THAT YOU ARE A FAILURE OR HAVE LET YOURSELF OR YOUR FAMILY DOWN: 1

## 2023-07-06 DIAGNOSIS — Z79.4 TYPE 2 DIABETES MELLITUS WITH HYPERGLYCEMIA, WITH LONG-TERM CURRENT USE OF INSULIN (HCC): ICD-10-CM

## 2023-07-06 DIAGNOSIS — E11.65 TYPE 2 DIABETES MELLITUS WITH HYPERGLYCEMIA, WITH LONG-TERM CURRENT USE OF INSULIN (HCC): ICD-10-CM

## 2023-07-06 RX ORDER — BLOOD-GLUCOSE SENSOR
EACH MISCELLANEOUS
Qty: 3 EACH | Refills: 5 | Status: SHIPPED | OUTPATIENT
Start: 2023-07-06

## 2023-07-21 ENCOUNTER — TELEPHONE (OUTPATIENT)
Age: 43
End: 2023-07-21

## 2023-07-21 NOTE — TELEPHONE ENCOUNTER
Phoned the patient to schedule a sleep consult per Sherie Beltre MD .  VM full, unable to leave a message.

## 2023-07-24 ENCOUNTER — OFFICE VISIT (OUTPATIENT)
Age: 43
End: 2023-07-24
Payer: MEDICAID

## 2023-07-24 VITALS
DIASTOLIC BLOOD PRESSURE: 90 MMHG | WEIGHT: 293 LBS | BODY MASS INDEX: 50.02 KG/M2 | OXYGEN SATURATION: 98 % | TEMPERATURE: 97.6 F | SYSTOLIC BLOOD PRESSURE: 145 MMHG | HEART RATE: 117 BPM | HEIGHT: 64 IN | RESPIRATION RATE: 20 BRPM

## 2023-07-24 DIAGNOSIS — E11.65 TYPE 2 DIABETES MELLITUS WITH HYPERGLYCEMIA, WITH LONG-TERM CURRENT USE OF INSULIN (HCC): Primary | ICD-10-CM

## 2023-07-24 DIAGNOSIS — J45.50 SEVERE PERSISTENT ASTHMA, UNCOMPLICATED: ICD-10-CM

## 2023-07-24 DIAGNOSIS — Z12.31 ENCOUNTER FOR SCREENING MAMMOGRAM FOR BREAST CANCER: ICD-10-CM

## 2023-07-24 DIAGNOSIS — H18.623: ICD-10-CM

## 2023-07-24 DIAGNOSIS — E66.01 OBESITY, MORBID, BMI 50 OR HIGHER (HCC): ICD-10-CM

## 2023-07-24 DIAGNOSIS — H18.603 KERATOCONUS, UNSPECIFIED, BILATERAL: ICD-10-CM

## 2023-07-24 DIAGNOSIS — I10 ESSENTIAL (PRIMARY) HYPERTENSION: ICD-10-CM

## 2023-07-24 DIAGNOSIS — Z01.419 ENCOUNTER FOR CERVICAL PAP SMEAR WITH PELVIC EXAM: ICD-10-CM

## 2023-07-24 DIAGNOSIS — F33.2 SEVERE EPISODE OF RECURRENT MAJOR DEPRESSIVE DISORDER, WITHOUT PSYCHOTIC FEATURES (HCC): ICD-10-CM

## 2023-07-24 DIAGNOSIS — Z79.4 TYPE 2 DIABETES MELLITUS WITH HYPERGLYCEMIA, WITH LONG-TERM CURRENT USE OF INSULIN (HCC): Primary | ICD-10-CM

## 2023-07-24 DIAGNOSIS — Z79.899 ENCOUNTER FOR LONG-TERM (CURRENT) USE OF MEDICATIONS: ICD-10-CM

## 2023-07-24 DIAGNOSIS — F43.10 PTSD (POST-TRAUMATIC STRESS DISORDER): ICD-10-CM

## 2023-07-24 DIAGNOSIS — F43.21 ADJUSTMENT DISORDER WITH DEPRESSED MOOD: ICD-10-CM

## 2023-07-24 PROCEDURE — 3078F DIAST BP <80 MM HG: CPT | Performed by: FAMILY MEDICINE

## 2023-07-24 PROCEDURE — 3046F HEMOGLOBIN A1C LEVEL >9.0%: CPT | Performed by: FAMILY MEDICINE

## 2023-07-24 PROCEDURE — 99214 OFFICE O/P EST MOD 30 MIN: CPT | Performed by: FAMILY MEDICINE

## 2023-07-24 PROCEDURE — 3074F SYST BP LT 130 MM HG: CPT | Performed by: FAMILY MEDICINE

## 2023-07-24 RX ORDER — MONTELUKAST SODIUM 10 MG/1
10 TABLET ORAL DAILY
Qty: 90 TABLET | Refills: 3 | Status: SHIPPED | OUTPATIENT
Start: 2023-07-24

## 2023-07-24 RX ORDER — RISPERIDONE 3 MG/1
3 TABLET ORAL DAILY
Qty: 90 TABLET | Refills: 0 | Status: SHIPPED | OUTPATIENT
Start: 2023-07-24

## 2023-07-24 RX ORDER — DULAGLUTIDE 0.75 MG/.5ML
0.75 INJECTION, SOLUTION SUBCUTANEOUS WEEKLY
Qty: 6 ML | Refills: 0 | Status: SHIPPED | OUTPATIENT
Start: 2023-07-24

## 2023-07-24 RX ORDER — LOSARTAN POTASSIUM AND HYDROCHLOROTHIAZIDE 12.5; 1 MG/1; MG/1
1 TABLET ORAL EVERY MORNING
Qty: 90 TABLET | Refills: 1 | Status: SHIPPED | OUTPATIENT
Start: 2023-07-24

## 2023-07-24 RX ORDER — BUPROPION HYDROCHLORIDE 300 MG/1
300 TABLET ORAL DAILY
Qty: 90 TABLET | Refills: 0 | Status: SHIPPED | OUTPATIENT
Start: 2023-07-24

## 2023-07-24 NOTE — PROGRESS NOTES
[facility] (: 1980) is a 41 y.o. female, established patient, here for evaluation of the following chief complaint(s):  Diabetes (Routine check up/)       ASSESSMENT/PLAN:  Wendy was seen today for diabetes. Diagnoses and all orders for this visit:    Type 2 diabetes mellitus with hyperglycemia, with long-term current use of insulin (HCC)  -     Dulaglutide (TRULICITY) 8.81 GD/4.1LL SOPN; Inject 0.75 mg into the skin once a week Replaces 1.5 mg dose due to significant GI side effects    Severe episode of recurrent major depressive disorder, without psychotic features (HCC)  -     risperiDONE (RISPERDAL) 3 MG tablet; Take 1 tablet by mouth daily Increase dose, decreased frequency  -     buPROPion (WELLBUTRIN XL) 300 MG extended release tablet; Take 1 tablet by mouth daily    PTSD (post-traumatic stress disorder)  -     risperiDONE (RISPERDAL) 3 MG tablet; Take 1 tablet by mouth daily Increase dose, decreased frequency  -     buPROPion (WELLBUTRIN XL) 300 MG extended release tablet; Take 1 tablet by mouth daily    Essential (primary) hypertension  -     losartan-hydroCHLOROthiazide (HYZAAR) 100-12.5 MG per tablet; Take 1 tablet by mouth every morning Replaces separate pills    Severe persistent asthma, uncomplicated    Adjustment disorder with depressed mood  -     risperiDONE (RISPERDAL) 3 MG tablet; Take 1 tablet by mouth daily Increase dose, decreased frequency  -     buPROPion (WELLBUTRIN XL) 300 MG extended release tablet; Take 1 tablet by mouth daily    Keratoconus, unspecified, bilateral    Acute hydrops keratoconus of both eyes    Encounter for screening mammogram for breast cancer  -     [facility] PETRA DIGITAL DIAGNOSTIC BILATERAL;  Future    Encounter for cervical Pap smear with pelvic exam  -     [facility] - Christopher Gonsales MD, Ob-Gyn, [place]    Obesity, morbid, BMI 50 or higher ([address])  -     Ed Carolina MD, Bariatric Surgery, [place] (Cathryn June)    Other orders  -     montelukast

## 2023-07-24 NOTE — PATIENT INSTRUCTIONS
Try this link/guide for a sample 7 day plant based diet:    https://lifestylemedicine.org/wp-content/uploads/2022/07/Eating-on-Budget. pdf

## 2023-07-24 NOTE — PROGRESS NOTES
Chief Complaint   Patient presents with    Diabetes     Routine check up       1. Have you been to the ER, urgent care clinic since your last visit? Hospitalized since your last visit? No    2. Have you seen or consulted any other health care providers outside of the [address] [place] [address] Avenue since your last visit? Include any pap smears or colon screening.  Yes Where: Pulmonary

## 2023-07-25 DIAGNOSIS — E11.65 TYPE 2 DIABETES MELLITUS WITH HYPERGLYCEMIA, WITH LONG-TERM CURRENT USE OF INSULIN (HCC): ICD-10-CM

## 2023-07-25 DIAGNOSIS — E11.649 TYPE 2 DIABETES MELLITUS WITH HYPOGLYCEMIA WITHOUT COMA, WITH LONG-TERM CURRENT USE OF INSULIN (HCC): ICD-10-CM

## 2023-07-25 DIAGNOSIS — Z79.4 TYPE 2 DIABETES MELLITUS WITH HYPERGLYCEMIA, WITH LONG-TERM CURRENT USE OF INSULIN (HCC): ICD-10-CM

## 2023-07-25 DIAGNOSIS — Z11.4 SCREENING FOR HIV WITHOUT PRESENCE OF RISK FACTORS: ICD-10-CM

## 2023-07-25 DIAGNOSIS — I10 ESSENTIAL (PRIMARY) HYPERTENSION: ICD-10-CM

## 2023-07-25 DIAGNOSIS — Z79.4 TYPE 2 DIABETES MELLITUS WITH HYPOGLYCEMIA WITHOUT COMA, WITH LONG-TERM CURRENT USE OF INSULIN (HCC): ICD-10-CM

## 2023-07-25 DIAGNOSIS — Z79.899 ENCOUNTER FOR LONG-TERM (CURRENT) USE OF MEDICATIONS: ICD-10-CM

## 2023-07-26 LAB
ALBUMIN SERPL-MCNC: 3.8 G/DL (ref 3.5–5)
ALBUMIN/GLOB SERPL: 1.1 (ref 1.1–2.2)
ALP SERPL-CCNC: 140 U/L (ref 45–117)
ALT SERPL-CCNC: 34 U/L (ref 12–78)
ANION GAP SERPL CALC-SCNC: 8 MMOL/L (ref 5–15)
AST SERPL-CCNC: 32 U/L (ref 15–37)
BILIRUB SERPL-MCNC: 0.3 MG/DL (ref 0.2–1)
BUN SERPL-MCNC: 20 MG/DL (ref 6–20)
BUN/CREAT SERPL: 19 (ref 12–20)
CALCIUM SERPL-MCNC: 10.2 MG/DL (ref 8.5–10.1)
CHLORIDE SERPL-SCNC: 102 MMOL/L (ref 97–108)
CHOLEST SERPL-MCNC: 106 MG/DL
CO2 SERPL-SCNC: 27 MMOL/L (ref 21–32)
CREAT SERPL-MCNC: 1.08 MG/DL (ref 0.55–1.02)
CREAT UR-MCNC: 183 MG/DL
EST. AVERAGE GLUCOSE BLD GHB EST-MCNC: 243 MG/DL
GLOBULIN SER CALC-MCNC: 3.6 G/DL (ref 2–4)
GLUCOSE SERPL-MCNC: 218 MG/DL (ref 65–100)
HBA1C MFR BLD: 10.1 % (ref 4–5.6)
HDLC SERPL-MCNC: 49 MG/DL
HDLC SERPL: 2.2 (ref 0–5)
HIV 1+2 AB+HIV1 P24 AG SERPL QL IA: NONREACTIVE
HIV 1/2 RESULT COMMENT: NORMAL
LDLC SERPL CALC-MCNC: 37.8 MG/DL (ref 0–100)
MICROALBUMIN UR-MCNC: 1.68 MG/DL
MICROALBUMIN/CREAT UR-RTO: 9 MG/G (ref 0–30)
POTASSIUM SERPL-SCNC: 4.5 MMOL/L (ref 3.5–5.1)
PROT SERPL-MCNC: 7.4 G/DL (ref 6.4–8.2)
SODIUM SERPL-SCNC: 137 MMOL/L (ref 136–145)
TRIGL SERPL-MCNC: 96 MG/DL
TSH SERPL DL<=0.05 MIU/L-ACNC: 3.61 UIU/ML (ref 0.36–3.74)
VLDLC SERPL CALC-MCNC: 19.2 MG/DL

## 2023-08-15 ENCOUNTER — NURSE TRIAGE (OUTPATIENT)
Dept: OTHER | Facility: CLINIC | Age: 43
End: 2023-08-15

## 2023-08-15 ENCOUNTER — APPOINTMENT (OUTPATIENT)
Facility: HOSPITAL | Age: 43
End: 2023-08-15
Payer: MEDICAID

## 2023-08-15 ENCOUNTER — HOSPITAL ENCOUNTER (EMERGENCY)
Facility: HOSPITAL | Age: 43
Discharge: HOME | End: 2023-08-15
Attending: STUDENT IN AN ORGANIZED HEALTH CARE EDUCATION/TRAINING PROGRAM
Payer: MEDICAID

## 2023-08-15 VITALS
RESPIRATION RATE: 24 BRPM | HEIGHT: 64 IN | BODY MASS INDEX: 50.02 KG/M2 | HEART RATE: 97 BPM | DIASTOLIC BLOOD PRESSURE: 69 MMHG | WEIGHT: 293 LBS | OXYGEN SATURATION: 95 % | TEMPERATURE: 98.1 F | SYSTOLIC BLOOD PRESSURE: 114 MMHG

## 2023-08-15 DIAGNOSIS — J45.21 MILD INTERMITTENT ASTHMA WITH EXACERBATION: Primary | ICD-10-CM

## 2023-08-15 LAB
ALBUMIN SERPL-MCNC: 3.6 G/DL (ref 3.5–5)
ALBUMIN/GLOB SERPL: 0.9 (ref 1.1–2.2)
ALP SERPL-CCNC: 132 U/L (ref 45–117)
ALT SERPL-CCNC: 33 U/L (ref 12–78)
ANION GAP SERPL CALC-SCNC: 4 MMOL/L (ref 5–15)
AST SERPL-CCNC: 19 U/L (ref 15–37)
BASOPHILS # BLD: 0 K/UL (ref 0–0.1)
BASOPHILS NFR BLD: 0 % (ref 0–1)
BILIRUB SERPL-MCNC: 0.4 MG/DL (ref 0.2–1)
BUN SERPL-MCNC: 16 MG/DL (ref 6–20)
BUN/CREAT SERPL: 15 (ref 12–20)
CALCIUM SERPL-MCNC: 9.8 MG/DL (ref 8.5–10.1)
CHLORIDE SERPL-SCNC: 101 MMOL/L (ref 97–108)
CO2 SERPL-SCNC: 28 MMOL/L (ref 21–32)
COMMENT:: NORMAL
CREAT SERPL-MCNC: 1.06 MG/DL (ref 0.55–1.02)
DIFFERENTIAL METHOD BLD: NORMAL
EKG ATRIAL RATE: 97 BPM
EKG DIAGNOSIS: NORMAL
EKG P AXIS: 49 DEGREES
EKG P-R INTERVAL: 162 MS
EKG Q-T INTERVAL: 376 MS
EKG QRS DURATION: 74 MS
EKG QTC CALCULATION (BAZETT): 477 MS
EKG R AXIS: 66 DEGREES
EKG T AXIS: 28 DEGREES
EKG VENTRICULAR RATE: 97 BPM
EOSINOPHIL # BLD: 0.3 K/UL (ref 0–0.4)
EOSINOPHIL NFR BLD: 3 % (ref 0–7)
ERYTHROCYTE [DISTWIDTH] IN BLOOD BY AUTOMATED COUNT: 12.8 % (ref 11.5–14.5)
GLOBULIN SER CALC-MCNC: 4 G/DL (ref 2–4)
GLUCOSE BLD STRIP.AUTO-MCNC: 324 MG/DL (ref 65–117)
GLUCOSE SERPL-MCNC: 340 MG/DL (ref 65–100)
HCT VFR BLD AUTO: 39.1 % (ref 35–47)
HGB BLD-MCNC: 12.6 G/DL (ref 11.5–16)
IMM GRANULOCYTES # BLD AUTO: 0 K/UL (ref 0–0.04)
IMM GRANULOCYTES NFR BLD AUTO: 0 % (ref 0–0.5)
LYMPHOCYTES # BLD: 2.7 K/UL (ref 0.8–3.5)
LYMPHOCYTES NFR BLD: 27 % (ref 12–49)
MCH RBC QN AUTO: 28.4 PG (ref 26–34)
MCHC RBC AUTO-ENTMCNC: 32.2 G/DL (ref 30–36.5)
MCV RBC AUTO: 88.1 FL (ref 80–99)
MONOCYTES # BLD: 0.6 K/UL (ref 0–1)
MONOCYTES NFR BLD: 6 % (ref 5–13)
NEUTS SEG # BLD: 6.3 K/UL (ref 1.8–8)
NEUTS SEG NFR BLD: 64 % (ref 32–75)
NRBC # BLD: 0 K/UL (ref 0–0.01)
NRBC BLD-RTO: 0 PER 100 WBC
PLATELET # BLD AUTO: 353 K/UL (ref 150–400)
PMV BLD AUTO: 9.7 FL (ref 8.9–12.9)
POTASSIUM SERPL-SCNC: 4 MMOL/L (ref 3.5–5.1)
PROT SERPL-MCNC: 7.6 G/DL (ref 6.4–8.2)
RBC # BLD AUTO: 4.44 M/UL (ref 3.8–5.2)
SERVICE CMNT-IMP: ABNORMAL
SODIUM SERPL-SCNC: 133 MMOL/L (ref 136–145)
SPECIMEN HOLD: NORMAL
WBC # BLD AUTO: 10.1 K/UL (ref 3.6–11)

## 2023-08-15 PROCEDURE — 36415 COLL VENOUS BLD VENIPUNCTURE: CPT

## 2023-08-15 PROCEDURE — 85025 COMPLETE CBC W/AUTO DIFF WBC: CPT

## 2023-08-15 PROCEDURE — 80053 COMPREHEN METABOLIC PANEL: CPT

## 2023-08-15 PROCEDURE — 2580000003 HC RX 258: Performed by: EMERGENCY MEDICINE

## 2023-08-15 PROCEDURE — 99285 EMERGENCY DEPT VISIT HI MDM: CPT

## 2023-08-15 PROCEDURE — 93005 ELECTROCARDIOGRAM TRACING: CPT | Performed by: EMERGENCY MEDICINE

## 2023-08-15 PROCEDURE — 71046 X-RAY EXAM CHEST 2 VIEWS: CPT

## 2023-08-15 PROCEDURE — 82962 GLUCOSE BLOOD TEST: CPT

## 2023-08-15 PROCEDURE — 6370000000 HC RX 637 (ALT 250 FOR IP): Performed by: EMERGENCY MEDICINE

## 2023-08-15 RX ORDER — PREDNISONE 20 MG/1
40 TABLET ORAL
Status: DISCONTINUED | OUTPATIENT
Start: 2023-08-15 | End: 2023-08-15

## 2023-08-15 RX ORDER — PREDNISONE 20 MG/1
60 TABLET ORAL
Status: COMPLETED | OUTPATIENT
Start: 2023-08-15 | End: 2023-08-15

## 2023-08-15 RX ORDER — MAGNESIUM SULFATE IN WATER 40 MG/ML
2000 INJECTION, SOLUTION INTRAVENOUS
Status: DISCONTINUED | OUTPATIENT
Start: 2023-08-15 | End: 2023-08-15

## 2023-08-15 RX ORDER — PREDNISONE 20 MG/1
40 TABLET ORAL DAILY
Qty: 10 TABLET | Refills: 0 | Status: SHIPPED | OUTPATIENT
Start: 2023-08-15 | End: 2023-08-20

## 2023-08-15 RX ORDER — 0.9 % SODIUM CHLORIDE 0.9 %
1000 INTRAVENOUS SOLUTION INTRAVENOUS ONCE
Status: COMPLETED | OUTPATIENT
Start: 2023-08-15 | End: 2023-08-15

## 2023-08-15 RX ORDER — IPRATROPIUM BROMIDE AND ALBUTEROL SULFATE 2.5; .5 MG/3ML; MG/3ML
1 SOLUTION RESPIRATORY (INHALATION) EVERY 30 MIN PRN
Status: DISCONTINUED | OUTPATIENT
Start: 2023-08-15 | End: 2023-08-15 | Stop reason: HOSPADM

## 2023-08-15 RX ADMIN — PREDNISONE 60 MG: 20 TABLET ORAL at 10:21

## 2023-08-15 RX ADMIN — IPRATROPIUM BROMIDE AND ALBUTEROL SULFATE 1 DOSE: .5; 3 SOLUTION RESPIRATORY (INHALATION) at 10:21

## 2023-08-15 RX ADMIN — SODIUM CHLORIDE 1000 ML: 9 INJECTION, SOLUTION INTRAVENOUS at 10:19

## 2023-08-15 ASSESSMENT — PAIN DESCRIPTION - PAIN TYPE: TYPE: ACUTE PAIN

## 2023-08-15 ASSESSMENT — PAIN - FUNCTIONAL ASSESSMENT
PAIN_FUNCTIONAL_ASSESSMENT: 0-10
PAIN_FUNCTIONAL_ASSESSMENT: ACTIVITIES ARE NOT PREVENTED

## 2023-08-15 ASSESSMENT — ENCOUNTER SYMPTOMS
SORE THROAT: 0
VOMITING: 0
WHEEZING: 1
BACK PAIN: 0
CHEST TIGHTNESS: 1
COLOR CHANGE: 0
DIARRHEA: 0
SHORTNESS OF BREATH: 1
ABDOMINAL PAIN: 0
COUGH: 0

## 2023-08-15 ASSESSMENT — PAIN DESCRIPTION - DESCRIPTORS: DESCRIPTORS: TIGHTNESS

## 2023-08-15 ASSESSMENT — PAIN DESCRIPTION - LOCATION: LOCATION: CHEST

## 2023-08-15 ASSESSMENT — PAIN DESCRIPTION - ONSET: ONSET: ON-GOING

## 2023-08-15 ASSESSMENT — PAIN SCALES - GENERAL: PAINLEVEL_OUTOF10: 8

## 2023-08-15 ASSESSMENT — PAIN DESCRIPTION - ORIENTATION: ORIENTATION: MID

## 2023-08-15 ASSESSMENT — PAIN DESCRIPTION - FREQUENCY: FREQUENCY: CONTINUOUS

## 2023-08-15 NOTE — ED PROVIDER NOTES
Called placed to pt regarding appointment for former [facility] pt who has medicaid. Left message for pt to call office to discuss concerns.     obesity ([address]) 10/28/2014    Stroke ([address])        SURGICAL HISTORY       Past Surgical History:   Procedure Laterality Date    GYN      3 C-sections    GYN      Miscarriage       CURRENT MEDICATIONS       Previous Medications    ACETAMINOPHEN-CODEINE (TYLENOL #3) 300-30 MG PER TABLET    TAKE 1 TABLET BY MOUTH EVERY 6 HOURS AS NEEDED FOR PAIN FOR UP TO 7 DAYS.  DO NOT EXCEED 4 TABLETS PER DAY    ALBUTEROL (PROVENTIL) (2.5 MG/3ML) 0.083% NEBULIZER SOLUTION    ceived the following from [facility]: Outside name: albuterol (PROVENTIL VENTOLIN) 2.5 mg /3 mL (0.083 %) nebu    ALBUTEROL SULFATE HFA (PROVENTIL;VENTOLIN;PROAIR) 108 (90 BASE) MCG/ACT INHALER    Inhale 2 puffs into the lungs every 4 hours as needed for Wheezing    AMLODIPINE (NORVASC) 10 MG TABLET    Take 1 tablet by mouth daily    ASCORBIC ACID (VITAMIN C) 500 MG TABLET    Take 1 tablet by mouth daily    ASPIRIN 81 MG EC TABLET    Take 1 tablet by mouth daily    ATENOLOL (TENORMIN) 50 MG TABLET    TAKE 1 TABLET DAILY- REPLACES LABETOLOL    ATORVASTATIN (LIPITOR) 20 MG TABLET    Take 1 tablet by mouth daily    BUDESON-GLYCOPYRROL-FORMOTEROL (BREZTRI AEROSPHERE) 160-9-4.8 MCG/ACT AERO    Inhale 1 spray into the lungs 2 times daily    BUDESONIDE (PULMICORT) 1 MG/2ML NEBULIZER SUSPENSION    Inhale 2 mLs into the lungs 2 times daily as needed    BUPROPION (WELLBUTRIN XL) 300 MG EXTENDED RELEASE TABLET    Take 1 tablet by mouth daily    CETIRIZINE (ZYRTEC) 10 MG TABLET    Take 1 tablet by mouth daily as needed    CONTINUOUS BLOOD GLUC SENSOR (DEXCOM G7 SENSOR) MISC    Check sugars 4-5 x per day    DULAGLUTIDE (TRULICITY) 5.88 EI/0.9RB SOPN    Inject 0.75 mg into the skin once a week Replaces 1.5 mg dose due to significant GI side effects    DUPILUMAB (DUPIXENT) 300 MG/2ML SOSY INJECTION    Inject 2 mLs into the skin every 14 days    ESCITALOPRAM (LEXAPRO) 20 MG TABLET    Take 1 tablet by mouth daily    GLIPIZIDE (GLUCOTROL) 5 MG TABLET    Take 1 tablet

## 2023-08-15 NOTE — ED TRIAGE NOTES
Patient is coming in via EMS for SOB that started this morning. History of asthma. Patient is on Prednisone at this time.

## 2023-08-15 NOTE — TELEPHONE ENCOUNTER
Location of patient: unsure, didn't get a chance to ask due to pressing symptoms/situation. Received call from [facility] with [employer]. Caller is having difficulty breathing, having trouble finishing sentences. 911 is on the way. Called to get a hold of mother who works at the office. Attempted laburnum but they stated she works at [employer]. Mother's name is Jody. Caller stated her son got a hold of \"Karol\" and the ambulance as arrived. [facility] office mother works at. Recommended disposition: Call  Now    Care advice provided, patient verbalizes understanding; denies any other questions or concerns; instructed to call back for any new or worsening symptoms. Patient/caller agrees to calling 911    Attention Provider: Thank you for allowing me to participate in the care of your patient. The patient was connected to triage in response to information provided to the ECC/PSC. Please do not respond through this encounter as the response is not directed to a shared pool.           Reason for Disposition   SEVERE difficulty breathing (e.g., struggling for each breath, speaks in single words, pulse > 120)    Protocols used: Breathing Difficulty-ADULT-[place]

## 2023-09-01 ENCOUNTER — APPOINTMENT (OUTPATIENT)
Facility: HOSPITAL | Age: 43
End: 2023-09-01

## 2023-09-01 ENCOUNTER — HOSPITAL ENCOUNTER (OUTPATIENT)
Facility: HOSPITAL | Age: 43
Setting detail: OBSERVATION
Discharge: HOME | End: 2023-09-03
Attending: EMERGENCY MEDICINE | Admitting: HOSPITALIST

## 2023-09-01 DIAGNOSIS — I63.9 CEREBROVASCULAR ACCIDENT (CVA), UNSPECIFIED MECHANISM (HCC): Primary | ICD-10-CM

## 2023-09-01 LAB
ALBUMIN SERPL-MCNC: 4 G/DL (ref 3.5–5)
ALBUMIN/GLOB SERPL: 1 (ref 1.1–2.2)
ALP SERPL-CCNC: 123 U/L (ref 45–117)
ALT SERPL-CCNC: 39 U/L (ref 12–78)
AMMONIA PLAS-SCNC: 31 UMOL/L
ANION GAP SERPL CALC-SCNC: 7 MMOL/L (ref 5–15)
AST SERPL-CCNC: 29 U/L (ref 15–37)
BASOPHILS # BLD: 0 K/UL (ref 0–0.1)
BASOPHILS NFR BLD: 0 % (ref 0–1)
BILIRUB SERPL-MCNC: 0.5 MG/DL (ref 0.2–1)
BUN SERPL-MCNC: 20 MG/DL (ref 6–20)
BUN/CREAT SERPL: 17 (ref 12–20)
CALCIUM SERPL-MCNC: 9.3 MG/DL (ref 8.5–10.1)
CHLORIDE SERPL-SCNC: 101 MMOL/L (ref 97–108)
CHP ED QC CHECK: 290
CO2 SERPL-SCNC: 28 MMOL/L (ref 21–32)
COMMENT:: NORMAL
CREAT SERPL-MCNC: 1.15 MG/DL (ref 0.55–1.02)
DIFFERENTIAL METHOD BLD: ABNORMAL
EOSINOPHIL # BLD: 0 K/UL (ref 0–0.4)
EOSINOPHIL NFR BLD: 0 % (ref 0–7)
ERYTHROCYTE [DISTWIDTH] IN BLOOD BY AUTOMATED COUNT: 13.1 % (ref 11.5–14.5)
EST. AVERAGE GLUCOSE BLD GHB EST-MCNC: 249 MG/DL
ETHANOL SERPL-MCNC: <10 MG/DL (ref 0–0.08)
GLOBULIN SER CALC-MCNC: 4.1 G/DL (ref 2–4)
GLUCOSE BLD STRIP.AUTO-MCNC: 130 MG/DL (ref 65–117)
GLUCOSE BLD STRIP.AUTO-MCNC: 91 MG/DL (ref 65–117)
GLUCOSE SERPL-MCNC: 239 MG/DL (ref 65–100)
HBA1C MFR BLD: 10.3 % (ref 4–5.6)
HCT VFR BLD AUTO: 39.2 % (ref 35–47)
HGB BLD-MCNC: 12.9 G/DL (ref 11.5–16)
IMM GRANULOCYTES # BLD AUTO: 0.1 K/UL (ref 0–0.04)
IMM GRANULOCYTES NFR BLD AUTO: 1 % (ref 0–0.5)
INR PPP: 1.1 (ref 0.9–1.1)
LYMPHOCYTES # BLD: 1.5 K/UL (ref 0.8–3.5)
LYMPHOCYTES NFR BLD: 12 % (ref 12–49)
MCH RBC QN AUTO: 28.5 PG (ref 26–34)
MCHC RBC AUTO-ENTMCNC: 32.9 G/DL (ref 30–36.5)
MCV RBC AUTO: 86.7 FL (ref 80–99)
MONOCYTES # BLD: 0.3 K/UL (ref 0–1)
MONOCYTES NFR BLD: 2 % (ref 5–13)
NEUTS SEG # BLD: 10.1 K/UL (ref 1.8–8)
NEUTS SEG NFR BLD: 85 % (ref 32–75)
NRBC # BLD: 0 K/UL (ref 0–0.01)
NRBC BLD-RTO: 0 PER 100 WBC
PLATELET # BLD AUTO: 366 K/UL (ref 150–400)
PMV BLD AUTO: 9.7 FL (ref 8.9–12.9)
POTASSIUM SERPL-SCNC: 4.4 MMOL/L (ref 3.5–5.1)
PROT SERPL-MCNC: 8.1 G/DL (ref 6.4–8.2)
PROTHROMBIN TIME: 11.1 SEC (ref 9–11.1)
RBC # BLD AUTO: 4.52 M/UL (ref 3.8–5.2)
SERVICE CMNT-IMP: ABNORMAL
SERVICE CMNT-IMP: NORMAL
SODIUM SERPL-SCNC: 136 MMOL/L (ref 136–145)
SPECIMEN HOLD: NORMAL
TROPONIN I SERPL HS-MCNC: <4 NG/L (ref 0–51)
WBC # BLD AUTO: 12 K/UL (ref 3.6–11)

## 2023-09-01 PROCEDURE — 85610 PROTHROMBIN TIME: CPT

## 2023-09-01 PROCEDURE — 36415 COLL VENOUS BLD VENIPUNCTURE: CPT

## 2023-09-01 PROCEDURE — 93005 ELECTROCARDIOGRAM TRACING: CPT | Performed by: EMERGENCY MEDICINE

## 2023-09-01 PROCEDURE — 2580000003 HC RX 258: Performed by: HOSPITALIST

## 2023-09-01 PROCEDURE — 70450 CT HEAD/BRAIN W/O DYE: CPT

## 2023-09-01 PROCEDURE — APPNB30 APP NON BILLABLE TIME 0-30 MINS

## 2023-09-01 PROCEDURE — 95720 EEG PHY/QHP EA INCR W/VEEG: CPT | Performed by: PSYCHIATRY & NEUROLOGY

## 2023-09-01 PROCEDURE — 82077 ASSAY SPEC XCP UR&BREATH IA: CPT

## 2023-09-01 PROCEDURE — G0378 HOSPITAL OBSERVATION PER HR: HCPCS

## 2023-09-01 PROCEDURE — 82140 ASSAY OF AMMONIA: CPT

## 2023-09-01 PROCEDURE — 84484 ASSAY OF TROPONIN QUANT: CPT

## 2023-09-01 PROCEDURE — 85025 COMPLETE CBC W/AUTO DIFF WBC: CPT

## 2023-09-01 PROCEDURE — 80053 COMPREHEN METABOLIC PANEL: CPT

## 2023-09-01 PROCEDURE — 6370000000 HC RX 637 (ALT 250 FOR IP)

## 2023-09-01 PROCEDURE — 83036 HEMOGLOBIN GLYCOSYLATED A1C: CPT

## 2023-09-01 PROCEDURE — 6370000000 HC RX 637 (ALT 250 FOR IP): Performed by: HOSPITALIST

## 2023-09-01 PROCEDURE — 6360000004 HC RX CONTRAST MEDICATION: Performed by: EMERGENCY MEDICINE

## 2023-09-01 PROCEDURE — 71045 X-RAY EXAM CHEST 1 VIEW: CPT

## 2023-09-01 PROCEDURE — 70498 CT ANGIOGRAPHY NECK: CPT

## 2023-09-01 PROCEDURE — 99285 EMERGENCY DEPT VISIT HI MDM: CPT

## 2023-09-01 PROCEDURE — 82962 GLUCOSE BLOOD TEST: CPT

## 2023-09-01 PROCEDURE — 0042T CT BRAIN PERFUSION: CPT

## 2023-09-01 PROCEDURE — 70551 MRI BRAIN STEM W/O DYE: CPT

## 2023-09-01 RX ORDER — SODIUM CHLORIDE 0.9 % (FLUSH) 0.9 %
5-40 SYRINGE (ML) INJECTION EVERY 12 HOURS SCHEDULED
Status: DISCONTINUED | OUTPATIENT
Start: 2023-09-01 | End: 2023-09-03 | Stop reason: HOSPADM

## 2023-09-01 RX ORDER — DEXTROSE MONOHYDRATE 100 MG/ML
INJECTION, SOLUTION INTRAVENOUS CONTINUOUS PRN
Status: DISCONTINUED | OUTPATIENT
Start: 2023-09-01 | End: 2023-09-03 | Stop reason: HOSPADM

## 2023-09-01 RX ORDER — BUPROPION HYDROCHLORIDE 300 MG/1
300 TABLET ORAL DAILY
Status: DISCONTINUED | OUTPATIENT
Start: 2023-09-01 | End: 2023-09-01 | Stop reason: CLARIF

## 2023-09-01 RX ORDER — IPRATROPIUM BROMIDE AND ALBUTEROL SULFATE 2.5; .5 MG/3ML; MG/3ML
1 SOLUTION RESPIRATORY (INHALATION) EVERY 4 HOURS PRN
Status: DISCONTINUED | OUTPATIENT
Start: 2023-09-01 | End: 2023-09-03 | Stop reason: HOSPADM

## 2023-09-01 RX ORDER — ONDANSETRON 2 MG/ML
4 INJECTION INTRAMUSCULAR; INTRAVENOUS EVERY 6 HOURS PRN
Status: DISCONTINUED | OUTPATIENT
Start: 2023-09-01 | End: 2023-09-03 | Stop reason: HOSPADM

## 2023-09-01 RX ORDER — ENOXAPARIN SODIUM 100 MG/ML
30 INJECTION SUBCUTANEOUS 2 TIMES DAILY
Status: DISCONTINUED | OUTPATIENT
Start: 2023-09-02 | End: 2023-09-03 | Stop reason: HOSPADM

## 2023-09-01 RX ORDER — ONDANSETRON 4 MG/1
4 TABLET, ORALLY DISINTEGRATING ORAL EVERY 8 HOURS PRN
Status: DISCONTINUED | OUTPATIENT
Start: 2023-09-01 | End: 2023-09-03 | Stop reason: HOSPADM

## 2023-09-01 RX ORDER — HYDROXYZINE HYDROCHLORIDE 25 MG/1
25 TABLET, FILM COATED ORAL ONCE
Status: COMPLETED | OUTPATIENT
Start: 2023-09-02 | End: 2023-09-02

## 2023-09-01 RX ORDER — SODIUM CHLORIDE 9 MG/ML
INJECTION, SOLUTION INTRAVENOUS PRN
Status: DISCONTINUED | OUTPATIENT
Start: 2023-09-01 | End: 2023-09-03 | Stop reason: HOSPADM

## 2023-09-01 RX ORDER — ATENOLOL 50 MG/1
50 TABLET ORAL DAILY
Status: DISCONTINUED | OUTPATIENT
Start: 2023-09-01 | End: 2023-09-03 | Stop reason: HOSPADM

## 2023-09-01 RX ORDER — ASPIRIN 81 MG/1
81 TABLET, CHEWABLE ORAL DAILY
Status: DISCONTINUED | OUTPATIENT
Start: 2023-09-02 | End: 2023-09-01

## 2023-09-01 RX ORDER — ESCITALOPRAM OXALATE 10 MG/1
20 TABLET ORAL DAILY
Status: DISCONTINUED | OUTPATIENT
Start: 2023-09-02 | End: 2023-09-03 | Stop reason: HOSPADM

## 2023-09-01 RX ORDER — BUPROPION HYDROCHLORIDE 150 MG/1
150 TABLET, EXTENDED RELEASE ORAL 2 TIMES DAILY
Status: DISCONTINUED | OUTPATIENT
Start: 2023-09-01 | End: 2023-09-03 | Stop reason: HOSPADM

## 2023-09-01 RX ORDER — POLYETHYLENE GLYCOL 3350 17 G/17G
17 POWDER, FOR SOLUTION ORAL DAILY PRN
Status: DISCONTINUED | OUTPATIENT
Start: 2023-09-01 | End: 2023-09-03 | Stop reason: HOSPADM

## 2023-09-01 RX ORDER — ASPIRIN 81 MG/1
81 TABLET ORAL DAILY
Status: DISCONTINUED | OUTPATIENT
Start: 2023-09-01 | End: 2023-09-03 | Stop reason: HOSPADM

## 2023-09-01 RX ORDER — ASPIRIN 300 MG/1
300 SUPPOSITORY RECTAL DAILY
Status: DISCONTINUED | OUTPATIENT
Start: 2023-09-02 | End: 2023-09-01

## 2023-09-01 RX ORDER — ACETAMINOPHEN 325 MG/1
650 TABLET ORAL EVERY 6 HOURS PRN
Status: DISCONTINUED | OUTPATIENT
Start: 2023-09-01 | End: 2023-09-03 | Stop reason: HOSPADM

## 2023-09-01 RX ORDER — PANTOPRAZOLE SODIUM 40 MG/1
40 TABLET, DELAYED RELEASE ORAL DAILY
Status: DISCONTINUED | OUTPATIENT
Start: 2023-09-01 | End: 2023-09-03 | Stop reason: HOSPADM

## 2023-09-01 RX ORDER — MONTELUKAST SODIUM 10 MG/1
10 TABLET ORAL DAILY
Status: DISCONTINUED | OUTPATIENT
Start: 2023-09-02 | End: 2023-09-03 | Stop reason: HOSPADM

## 2023-09-01 RX ORDER — INSULIN GLARGINE 100 [IU]/ML
40 INJECTION, SOLUTION SUBCUTANEOUS NIGHTLY
Status: DISCONTINUED | OUTPATIENT
Start: 2023-09-01 | End: 2023-09-03 | Stop reason: HOSPADM

## 2023-09-01 RX ORDER — LANOLIN ALCOHOL/MO/W.PET/CERES
400 CREAM (GRAM) TOPICAL DAILY
Status: DISCONTINUED | OUTPATIENT
Start: 2023-09-01 | End: 2023-09-03 | Stop reason: HOSPADM

## 2023-09-01 RX ORDER — ATORVASTATIN CALCIUM 40 MG/1
80 TABLET, FILM COATED ORAL NIGHTLY
Status: DISCONTINUED | OUTPATIENT
Start: 2023-09-01 | End: 2023-09-03 | Stop reason: HOSPADM

## 2023-09-01 RX ORDER — RISPERIDONE 3 MG/1
3 TABLET ORAL DAILY
Status: DISCONTINUED | OUTPATIENT
Start: 2023-09-01 | End: 2023-09-02

## 2023-09-01 RX ORDER — SODIUM CHLORIDE 0.9 % (FLUSH) 0.9 %
5-40 SYRINGE (ML) INJECTION PRN
Status: DISCONTINUED | OUTPATIENT
Start: 2023-09-01 | End: 2023-09-03 | Stop reason: HOSPADM

## 2023-09-01 RX ORDER — INSULIN LISPRO 100 [IU]/ML
0-8 INJECTION, SOLUTION INTRAVENOUS; SUBCUTANEOUS
Status: DISCONTINUED | OUTPATIENT
Start: 2023-09-01 | End: 2023-09-03 | Stop reason: HOSPADM

## 2023-09-01 RX ORDER — INSULIN LISPRO 100 [IU]/ML
0-4 INJECTION, SOLUTION INTRAVENOUS; SUBCUTANEOUS NIGHTLY
Status: DISCONTINUED | OUTPATIENT
Start: 2023-09-01 | End: 2023-09-03 | Stop reason: HOSPADM

## 2023-09-01 RX ADMIN — INSULIN GLARGINE 40 UNITS: 100 INJECTION, SOLUTION SUBCUTANEOUS at 21:30

## 2023-09-01 RX ADMIN — ASPIRIN 81 MG: 81 TABLET, COATED ORAL at 19:22

## 2023-09-01 RX ADMIN — IPRATROPIUM BROMIDE AND ALBUTEROL SULFATE 1 DOSE: .5; 3 SOLUTION RESPIRATORY (INHALATION) at 22:12

## 2023-09-01 RX ADMIN — IOPAMIDOL 20 ML: 755 INJECTION, SOLUTION INTRAVENOUS at 13:24

## 2023-09-01 RX ADMIN — PANTOPRAZOLE SODIUM 40 MG: 40 TABLET, DELAYED RELEASE ORAL at 19:22

## 2023-09-01 RX ADMIN — ACETAMINOPHEN 650 MG: 325 TABLET ORAL at 22:12

## 2023-09-01 RX ADMIN — RISPERIDONE 3 MG: 3 TABLET ORAL at 22:12

## 2023-09-01 RX ADMIN — SODIUM CHLORIDE, PRESERVATIVE FREE 10 ML: 5 INJECTION INTRAVENOUS at 21:21

## 2023-09-01 RX ADMIN — ATENOLOL 50 MG: 50 TABLET ORAL at 19:22

## 2023-09-01 RX ADMIN — IOPAMIDOL 100 ML: 755 INJECTION, SOLUTION INTRAVENOUS at 13:24

## 2023-09-01 NOTE — ED NOTES
TRANSFER - OUT REPORT:    Verbal report given to [facility] and the [facility]  being transferred to [facility] for routine progression of patient care       Report consisted of patient's Situation, Background, Assessment and   Recommendations(SBAR). Information from the following report(s) Nurse Handoff Report, Index, ED Encounter Summary, ED SBAR, Adult Overview, Intake/Output, Recent Results, Med Rec Status, and Cardiac Rhythm SR  was reviewed with the receiving nurse. Berkley Fall Assessment:    Presents to emergency department  because of falls (Syncope, seizure, or loss of consciousness): No  Age > 70: No  Altered Mental Status, Intoxication with alcohol or substance confusion (Disorientation, impaired judgment, poor safety awaremess, or inability to follow instructions): No  Impaired Mobility: Ambulates or transfers with assistive devices or assistance; Unable to ambulate or transer.: No  Nursing Judgement: No          Lines:   Peripheral IV 09/01/23 Left Antecubital (Active)   Site Assessment Clean, dry & intact 09/01/23 1316        Opportunity for questions and clarification was provided.       Patient transported with:  Monitor and Registered Nurse          Jared Sargent RN  09/01/23 [id number]

## 2023-09-01 NOTE — ED NOTES
Hospitalist at bedside    Report given to [facility] RN using [address], RN  09/01/23 9797

## 2023-09-01 NOTE — ED PROVIDER NOTES
[facility] EMERGENCY DEP  EMERGENCY DEPARTMENT ENCOUNTER      Pt Name: Julia Cardona  MRN: [id number]  [address] [facility] 1980  Date of evaluation: 9/1/2023  Provider: Tyson Caldwell       Chief Complaint   Patient presents with    Extremity Weakness         HISTORY OF PRESENT ILLNESS   (Location/Symptom, Timing/Onset, Context/Setting, Quality, Duration, Modifying Factors, Severity)  Note limiting factors. 53-year-old female with a past medical history of TIAs presents emergency department with a report of sudden onset of facial weakness and difficulty with her speech approximately 30 minutes prior to arrival.  EMS reports that she had elevated blood sugar in the 290s, patient had difficulty with her speech however she was able to ambulate down her stairs out to the EMS vehicle. Patient arrives she is unable to provide further information. Review of External Medical Records:     Nursing Notes were reviewed. REVIEW OF SYSTEMS    (2-9 systems for level 4, 10 or more for level 5)     Review of Systems   Reason unable to perform ROS: difficulty with speech. Except as noted above the remainder of the review of systems was reviewed and negative. PAST MEDICAL HISTORY     Past Medical History:   Diagnosis Date    Alcohol abuse     Anxiety and depression     Asthma     DM (diabetes mellitus), type 2 ([address])     Morbid obesity ([address]) 10/28/2014    Stroke ([address])          SURGICAL HISTORY       Past Surgical History:   Procedure Laterality Date    GYN      3 C-sections    GYN      Miscarriage         CURRENT MEDICATIONS       Previous Medications    ACETAMINOPHEN-CODEINE (TYLENOL #3) 300-30 MG PER TABLET    TAKE 1 TABLET BY MOUTH EVERY 6 HOURS AS NEEDED FOR PAIN FOR UP TO 7 DAYS.  DO NOT EXCEED 4 TABLETS PER DAY    ALBUTEROL (PROVENTIL) (2.5 MG/3ML) 0.083% NEBULIZER SOLUTION    ceived the following from [facility]: Outside name: albuterol (PROVENTIL VENTOLIN) 2.5 mg /3 mL

## 2023-09-01 NOTE — ED NOTES
1253: pt arrives via ems from home  Per EMS: called to scene by family for slurred speech, confusion,numbness to left side of face and slow to get words out. Pt has hx of previous TIA. Blood glucose 290. Last known well was 30 minutes PTA    Dr Ana M Iraheta and Neuro NP Demar Arellano [place] [address] [place]. Pt reporting to RN and NP hx of previous TIA with residual left sided numbness to her face, arm and leg and \"it feels weird on my left side\" Pt is answering question appropriately, speech is clear but pt is speaking slowly when answering staff. 1305: Dr Matt Carrizales with tele-neuro assessing pt. Pt noted to complain of decreased sensation to left sided face,arm and leg. Pt also reported hx of right eye visual defect at baseline. Pt states her children called 911 \"because my speech wasn't normal and I was confused\"    1330: pt taken to ED room, placed on monitor x 3. Purwick placed by Chin Roth RN    1171: Pt rang call bell stating that the purwick leaked. Pt was noted to be speaking clearly and without the initial slow speech like when she arrived to the ED. Pt able to stand up and pivot to a chair for RN to change bed linens. Pt reporting she \"doesn't feel as wobbly as I did when I got here\" Pt again reported that she does have mild numbness to left side body at baseline. Pt provided with baby wipes, mesh panties, gown. Purwick d/c. Pt back to bed.  All needs addressed    1545: Report given to [facility] RN using [address], RN  09/01/23 0046

## 2023-09-01 NOTE — H&P
History and Physical    Date of Service:  9/1/2023  Primary Care Provider: Mae Person MD  Source of information: The patient and Chart review    Chief Complaint: Extremity Weakness      History of Presenting Illness:   Paty Lam is a 41 y.o. female who presented with 3 of TIA, anxiety depression, type 2 diabetes, alcohol abuse presented to the emergency room with acute onset of left facial weakness, speech difficulty and left upper extremity weakness. She was found to be hyperglycemic with BG of 290 for EMS. Evaluated in the ED for code stroke, CT head, CT perfusion, CTA head and neck were negative for acute findings. Teleneurologist were consulted, patient was felt to be not a candidate for tPA or mechanical thrombectomy, recommended admission for observation and stroke work-up. During my evaluation patient was alert oriented, able to speak clearly, no facial droop, except some questionable drift on the left upper extremity         REVIEW OF SYSTEMS:  A comprehensive review of systems was negative except for that written in the History of Present Illness. Past Medical History:   Diagnosis Date    Alcohol abuse     Anxiety and depression     Asthma     DM (diabetes mellitus), type 2 ([address])     Morbid obesity ([address]) 10/28/2014    Stroke [facility])       Past Surgical History:   Procedure Laterality Date    GYN      3 C-sections    GYN      Miscarriage     Prior to Admission medications    Medication Sig Start Date End Date Taking?  Authorizing Provider   montelukast (SINGULAIR) 10 MG tablet Take 1 tablet by mouth daily 7/24/23   Gavino Colunga MD   Dulaglutide (TRULICITY) 5.77 BM/1.7EK SOPN Inject 0.75 mg into the skin once a week Replaces 1.5 mg dose due to significant GI side effects 7/24/23   Gavino Colunga MD   risperiDONE (RISPERDAL) 3 MG tablet Take 1 tablet by mouth daily Increase dose, decreased frequency 7/24/23   Gavino Colunga MD   buPROPion (WELLBUTRIN XL) 300 MG extended

## 2023-09-02 ENCOUNTER — APPOINTMENT (OUTPATIENT)
Facility: HOSPITAL | Age: 43
End: 2023-09-02
Attending: HOSPITALIST

## 2023-09-02 LAB
CHOLEST SERPL-MCNC: 107 MG/DL
COMMENT:: NORMAL
EKG ATRIAL RATE: 72 BPM
EKG ATRIAL RATE: 76 BPM
EKG DIAGNOSIS: NORMAL
EKG DIAGNOSIS: NORMAL
EKG P AXIS: 50 DEGREES
EKG P AXIS: 56 DEGREES
EKG P-R INTERVAL: 148 MS
EKG P-R INTERVAL: 162 MS
EKG Q-T INTERVAL: 410 MS
EKG Q-T INTERVAL: 412 MS
EKG QRS DURATION: 76 MS
EKG QRS DURATION: 82 MS
EKG QTC CALCULATION (BAZETT): 448 MS
EKG QTC CALCULATION (BAZETT): 463 MS
EKG R AXIS: 30 DEGREES
EKG R AXIS: 31 DEGREES
EKG T AXIS: 12 DEGREES
EKG T AXIS: 5 DEGREES
EKG VENTRICULAR RATE: 72 BPM
EKG VENTRICULAR RATE: 76 BPM
ERYTHROCYTE [DISTWIDTH] IN BLOOD BY AUTOMATED COUNT: 12.9 % (ref 11.5–14.5)
EST. AVERAGE GLUCOSE BLD GHB EST-MCNC: 252 MG/DL
GLUCOSE BLD STRIP.AUTO-MCNC: 137 MG/DL (ref 65–117)
GLUCOSE BLD STRIP.AUTO-MCNC: 147 MG/DL (ref 65–117)
GLUCOSE BLD STRIP.AUTO-MCNC: 161 MG/DL (ref 65–117)
GLUCOSE BLD STRIP.AUTO-MCNC: 174 MG/DL (ref 65–117)
HBA1C MFR BLD: 10.4 % (ref 4–5.6)
HCT VFR BLD AUTO: 37.9 % (ref 35–47)
HDLC SERPL-MCNC: 55 MG/DL
HDLC SERPL: 1.9 (ref 0–5)
HGB BLD-MCNC: 12.6 G/DL (ref 11.5–16)
LDLC SERPL CALC-MCNC: 28.8 MG/DL (ref 0–100)
MCH RBC QN AUTO: 28.5 PG (ref 26–34)
MCHC RBC AUTO-ENTMCNC: 33.2 G/DL (ref 30–36.5)
MCV RBC AUTO: 85.7 FL (ref 80–99)
NRBC # BLD: 0 K/UL (ref 0–0.01)
NRBC BLD-RTO: 0 PER 100 WBC
PLATELET # BLD AUTO: 341 K/UL (ref 150–400)
PMV BLD AUTO: 9.1 FL (ref 8.9–12.9)
RBC # BLD AUTO: 4.42 M/UL (ref 3.8–5.2)
SERVICE CMNT-IMP: ABNORMAL
SPECIMEN HOLD: NORMAL
TRIGL SERPL-MCNC: 116 MG/DL
VLDLC SERPL CALC-MCNC: 23.2 MG/DL
WBC # BLD AUTO: 13.9 K/UL (ref 3.6–11)

## 2023-09-02 PROCEDURE — G0378 HOSPITAL OBSERVATION PER HR: HCPCS

## 2023-09-02 PROCEDURE — 36415 COLL VENOUS BLD VENIPUNCTURE: CPT

## 2023-09-02 PROCEDURE — C8929 TTE W OR WO FOL WCON,DOPPLER: HCPCS

## 2023-09-02 PROCEDURE — 80061 LIPID PANEL: CPT

## 2023-09-02 PROCEDURE — A4216 STERILE WATER/SALINE, 10 ML: HCPCS | Performed by: HOSPITALIST

## 2023-09-02 PROCEDURE — 6360000002 HC RX W HCPCS: Performed by: HOSPITALIST

## 2023-09-02 PROCEDURE — 6370000000 HC RX 637 (ALT 250 FOR IP)

## 2023-09-02 PROCEDURE — 93005 ELECTROCARDIOGRAM TRACING: CPT | Performed by: HOSPITALIST

## 2023-09-02 PROCEDURE — 6360000004 HC RX CONTRAST MEDICATION: Performed by: HOSPITALIST

## 2023-09-02 PROCEDURE — 96372 THER/PROPH/DIAG INJ SC/IM: CPT

## 2023-09-02 PROCEDURE — 6370000000 HC RX 637 (ALT 250 FOR IP): Performed by: HOSPITALIST

## 2023-09-02 PROCEDURE — 2580000003 HC RX 258: Performed by: HOSPITALIST

## 2023-09-02 PROCEDURE — 85027 COMPLETE CBC AUTOMATED: CPT

## 2023-09-02 PROCEDURE — 99204 OFFICE O/P NEW MOD 45 MIN: CPT | Performed by: PSYCHIATRY & NEUROLOGY

## 2023-09-02 PROCEDURE — 83036 HEMOGLOBIN GLYCOSYLATED A1C: CPT

## 2023-09-02 PROCEDURE — 82962 GLUCOSE BLOOD TEST: CPT

## 2023-09-02 RX ORDER — RISPERIDONE 3 MG/1
3 TABLET ORAL NIGHTLY
Status: DISCONTINUED | OUTPATIENT
Start: 2023-09-02 | End: 2023-09-03 | Stop reason: HOSPADM

## 2023-09-02 RX ORDER — ALPRAZOLAM 0.25 MG/1
0.25 TABLET ORAL 3 TIMES DAILY PRN
Status: DISCONTINUED | OUTPATIENT
Start: 2023-09-02 | End: 2023-09-03 | Stop reason: HOSPADM

## 2023-09-02 RX ADMIN — INSULIN GLARGINE 40 UNITS: 100 INJECTION, SOLUTION SUBCUTANEOUS at 20:36

## 2023-09-02 RX ADMIN — MONTELUKAST 10 MG: 10 TABLET, FILM COATED ORAL at 09:03

## 2023-09-02 RX ADMIN — SODIUM CHLORIDE, PRESERVATIVE FREE 10 ML: 5 INJECTION INTRAVENOUS at 20:45

## 2023-09-02 RX ADMIN — SODIUM CHLORIDE, PRESERVATIVE FREE 10 ML: 5 INJECTION INTRAVENOUS at 09:05

## 2023-09-02 RX ADMIN — PANTOPRAZOLE SODIUM 40 MG: 40 TABLET, DELAYED RELEASE ORAL at 09:03

## 2023-09-02 RX ADMIN — ESCITALOPRAM OXALATE 20 MG: 10 TABLET ORAL at 09:02

## 2023-09-02 RX ADMIN — BUPROPION HYDROCHLORIDE 150 MG: 150 TABLET, EXTENDED RELEASE ORAL at 20:38

## 2023-09-02 RX ADMIN — ASPIRIN 81 MG: 81 TABLET, COATED ORAL at 09:02

## 2023-09-02 RX ADMIN — ALPRAZOLAM 0.25 MG: 0.25 TABLET ORAL at 20:38

## 2023-09-02 RX ADMIN — ENOXAPARIN SODIUM 30 MG: 100 INJECTION SUBCUTANEOUS at 09:02

## 2023-09-02 RX ADMIN — HYDROXYZINE HYDROCHLORIDE 25 MG: 25 TABLET, FILM COATED ORAL at 00:00

## 2023-09-02 RX ADMIN — ALPRAZOLAM 0.25 MG: 0.25 TABLET ORAL at 12:28

## 2023-09-02 RX ADMIN — Medication 400 MG: at 09:02

## 2023-09-02 RX ADMIN — ENOXAPARIN SODIUM 30 MG: 100 INJECTION SUBCUTANEOUS at 20:35

## 2023-09-02 RX ADMIN — ATENOLOL 50 MG: 50 TABLET ORAL at 09:03

## 2023-09-02 RX ADMIN — Medication 40 ML: at 00:03

## 2023-09-02 RX ADMIN — PERFLUTREN 1 ML: 6.52 INJECTION, SUSPENSION INTRAVENOUS at 15:25

## 2023-09-02 RX ADMIN — RISPERIDONE 3 MG: 3 TABLET ORAL at 20:38

## 2023-09-02 RX ADMIN — ATORVASTATIN CALCIUM 80 MG: 40 TABLET, FILM COATED ORAL at 20:38

## 2023-09-02 RX ADMIN — BUPROPION HYDROCHLORIDE 150 MG: 150 TABLET, EXTENDED RELEASE ORAL at 09:03

## 2023-09-02 ASSESSMENT — PAIN SCALES - GENERAL
PAINLEVEL_OUTOF10: 0
PAINLEVEL_OUTOF10: 0

## 2023-09-02 NOTE — CONSULTS
Consult dictated. 46-year-old female with reported history of TIA in 2016, episode of slurred speech/staring in June of last year also suspected to be possible TIA given her vascular risk factors of diabetes, dyslipidemia. She had an episode of slurring of speech again yesterday lasting several minutes and she was not responsive. Earlier today she had an episode where she just started to stare blankly for a few minutes and then came back to normal.   MRI brain/CTA head and neck negative. Recommend connecting to 24-hour video EEG to rule out seizure as a cause. If this is negative as well, a psychogenic etiology may need to be considered.   Leslie Downey MD

## 2023-09-03 VITALS
HEART RATE: 85 BPM | BODY MASS INDEX: 48.82 KG/M2 | SYSTOLIC BLOOD PRESSURE: 123 MMHG | RESPIRATION RATE: 15 BRPM | DIASTOLIC BLOOD PRESSURE: 71 MMHG | HEIGHT: 65 IN | TEMPERATURE: 98.9 F | OXYGEN SATURATION: 96 % | WEIGHT: 293 LBS

## 2023-09-03 PROBLEM — R40.4 TRANSIENT ALTERATION OF AWARENESS: Status: ACTIVE | Noted: 2023-09-03

## 2023-09-03 PROBLEM — G45.9 TIA (TRANSIENT ISCHEMIC ATTACK): Status: RESOLVED | Noted: 2022-06-16 | Resolved: 2023-09-03

## 2023-09-03 LAB
ALBUMIN SERPL-MCNC: 3.4 G/DL (ref 3.5–5)
ALBUMIN/GLOB SERPL: 1.1 (ref 1.1–2.2)
ALP SERPL-CCNC: 114 U/L (ref 45–117)
ALT SERPL-CCNC: 32 U/L (ref 12–78)
ANION GAP SERPL CALC-SCNC: 4 MMOL/L (ref 5–15)
AST SERPL-CCNC: 16 U/L (ref 15–37)
BILIRUB SERPL-MCNC: 0.4 MG/DL (ref 0.2–1)
BUN SERPL-MCNC: 24 MG/DL (ref 6–20)
BUN/CREAT SERPL: 21 (ref 12–20)
CALCIUM SERPL-MCNC: 8.9 MG/DL (ref 8.5–10.1)
CHLORIDE SERPL-SCNC: 104 MMOL/L (ref 97–108)
CO2 SERPL-SCNC: 30 MMOL/L (ref 21–32)
COMMENT:: NORMAL
CREAT SERPL-MCNC: 1.12 MG/DL (ref 0.55–1.02)
ECHO AO ASC DIAM: 3.1 CM
ECHO AO ASCENDING AORTA INDEX: 1.27 CM/M2
ECHO AO ROOT DIAM: 2.6 CM
ECHO AO ROOT INDEX: 1.07 CM/M2
ECHO AV AREA PEAK VELOCITY: 2.4 CM2
ECHO AV AREA/BSA PEAK VELOCITY: 1 CM2/M2
ECHO AV PEAK GRADIENT: 10 MMHG
ECHO AV PEAK VELOCITY: 1.6 M/S
ECHO AV VELOCITY RATIO: 0.63
ECHO BSA: 2.61 M2
ECHO LA VOL 2C: 57 ML (ref 22–52)
ECHO LA VOL 2C: 57 ML (ref 22–52)
ECHO LA VOL 4C: 60 ML (ref 22–52)
ECHO LA VOL 4C: 64 ML (ref 22–52)
ECHO LA VOLUME AREA LENGTH: 62 ML
ECHO LA VOLUME INDEX AREA LENGTH: 25 ML/M2 (ref 16–34)
ECHO LV E' LATERAL VELOCITY: 13 CM/S
ECHO LV E' SEPTAL VELOCITY: 8 CM/S
ECHO LV EDV A2C: 114 ML
ECHO LV EDV A4C: 96 ML
ECHO LV EDV BP: 105 ML (ref 56–104)
ECHO LV EDV INDEX A4C: 39 ML/M2
ECHO LV EDV INDEX BP: 43 ML/M2
ECHO LV EDV NDEX A2C: 47 ML/M2
ECHO LV EJECTION FRACTION A2C: 52 %
ECHO LV EJECTION FRACTION A4C: 66 %
ECHO LV EJECTION FRACTION BIPLANE: 60 % (ref 55–100)
ECHO LV ESV A2C: 55 ML
ECHO LV ESV A4C: 32 ML
ECHO LV ESV BP: 42 ML (ref 19–49)
ECHO LV ESV INDEX A2C: 23 ML/M2
ECHO LV ESV INDEX A4C: 13 ML/M2
ECHO LV ESV INDEX BP: 17 ML/M2
ECHO LV FRACTIONAL SHORTENING: 26 % (ref 28–44)
ECHO LV INTERNAL DIMENSION DIASTOLE INDEX: 1.93 CM/M2
ECHO LV INTERNAL DIMENSION DIASTOLIC: 4.7 CM (ref 3.9–5.3)
ECHO LV INTERNAL DIMENSION SYSTOLIC INDEX: 1.43 CM/M2
ECHO LV INTERNAL DIMENSION SYSTOLIC: 3.5 CM
ECHO LV IVSD: 1.3 CM (ref 0.6–0.9)
ECHO LV MASS 2D: 199.6 G (ref 67–162)
ECHO LV MASS INDEX 2D: 81.8 G/M2 (ref 43–95)
ECHO LV POSTERIOR WALL DIASTOLIC: 1 CM (ref 0.6–0.9)
ECHO LV RELATIVE WALL THICKNESS RATIO: 0.43
ECHO LVOT AREA: 3.8 CM2
ECHO LVOT DIAM: 2.2 CM
ECHO LVOT PEAK GRADIENT: 4 MMHG
ECHO LVOT PEAK VELOCITY: 1 M/S
ECHO MV A VELOCITY: 0.67 M/S
ECHO MV AREA PHT: 3.5 CM2
ECHO MV E DECELERATION TIME (DT): 216 MS
ECHO MV E VELOCITY: 0.65 M/S
ECHO MV E/A RATIO: 0.97
ECHO MV E/E' LATERAL: 5
ECHO MV E/E' RATIO (AVERAGED): 6.56
ECHO MV E/E' SEPTAL: 8.13
ECHO MV PRESSURE HALF TIME (PHT): 62.6 MS
ECHO PULMONARY ARTERY END DIASTOLIC PRESSURE: 3 MMHG
ECHO PV MAX VELOCITY: 1.1 M/S
ECHO PV PEAK GRADIENT: 5 MMHG
ECHO PV REGURGITANT MAX VELOCITY: 0.9 M/S
ECHO RV TAPSE: 2.3 CM (ref 1.7–?)
ECHO TV REGURGITANT MAX VELOCITY: 2.24 M/S
ECHO TV REGURGITANT PEAK GRADIENT: 20 MMHG
ERYTHROCYTE [DISTWIDTH] IN BLOOD BY AUTOMATED COUNT: 12.9 % (ref 11.5–14.5)
GLOBULIN SER CALC-MCNC: 3.2 G/DL (ref 2–4)
GLUCOSE BLD STRIP.AUTO-MCNC: 147 MG/DL (ref 65–117)
GLUCOSE BLD STRIP.AUTO-MCNC: 211 MG/DL (ref 65–117)
GLUCOSE SERPL-MCNC: 161 MG/DL (ref 65–100)
HCT VFR BLD AUTO: 37.1 % (ref 35–47)
HGB BLD-MCNC: 12 G/DL (ref 11.5–16)
MCH RBC QN AUTO: 28.3 PG (ref 26–34)
MCHC RBC AUTO-ENTMCNC: 32.3 G/DL (ref 30–36.5)
MCV RBC AUTO: 87.5 FL (ref 80–99)
NRBC # BLD: 0 K/UL (ref 0–0.01)
NRBC BLD-RTO: 0 PER 100 WBC
PLATELET # BLD AUTO: 329 K/UL (ref 150–400)
PMV BLD AUTO: 9.4 FL (ref 8.9–12.9)
POTASSIUM SERPL-SCNC: 3.9 MMOL/L (ref 3.5–5.1)
PROT SERPL-MCNC: 6.6 G/DL (ref 6.4–8.2)
RBC # BLD AUTO: 4.24 M/UL (ref 3.8–5.2)
SERVICE CMNT-IMP: ABNORMAL
SERVICE CMNT-IMP: ABNORMAL
SODIUM SERPL-SCNC: 138 MMOL/L (ref 136–145)
SPECIMEN HOLD: NORMAL
WBC # BLD AUTO: 10 K/UL (ref 3.6–11)

## 2023-09-03 PROCEDURE — 2580000003 HC RX 258: Performed by: HOSPITALIST

## 2023-09-03 PROCEDURE — 80053 COMPREHEN METABOLIC PANEL: CPT

## 2023-09-03 PROCEDURE — G0378 HOSPITAL OBSERVATION PER HR: HCPCS

## 2023-09-03 PROCEDURE — 6370000000 HC RX 637 (ALT 250 FOR IP): Performed by: HOSPITALIST

## 2023-09-03 PROCEDURE — 99232 SBSQ HOSP IP/OBS MODERATE 35: CPT | Performed by: PSYCHIATRY & NEUROLOGY

## 2023-09-03 PROCEDURE — 82962 GLUCOSE BLOOD TEST: CPT

## 2023-09-03 PROCEDURE — 36415 COLL VENOUS BLD VENIPUNCTURE: CPT

## 2023-09-03 PROCEDURE — 85027 COMPLETE CBC AUTOMATED: CPT

## 2023-09-03 PROCEDURE — 95708 EEG WO VID EA 12-26HR UNMNTR: CPT

## 2023-09-03 PROCEDURE — 96372 THER/PROPH/DIAG INJ SC/IM: CPT

## 2023-09-03 PROCEDURE — 6360000002 HC RX W HCPCS: Performed by: HOSPITALIST

## 2023-09-03 RX ORDER — ATORVASTATIN CALCIUM 80 MG/1
80 TABLET, FILM COATED ORAL NIGHTLY
Qty: 30 TABLET | Refills: 3 | Status: SHIPPED | OUTPATIENT
Start: 2023-09-03

## 2023-09-03 RX ADMIN — ATENOLOL 50 MG: 50 TABLET ORAL at 08:47

## 2023-09-03 RX ADMIN — BUPROPION HYDROCHLORIDE 150 MG: 150 TABLET, EXTENDED RELEASE ORAL at 08:46

## 2023-09-03 RX ADMIN — PANTOPRAZOLE SODIUM 40 MG: 40 TABLET, DELAYED RELEASE ORAL at 08:46

## 2023-09-03 RX ADMIN — SODIUM CHLORIDE, PRESERVATIVE FREE 10 ML: 5 INJECTION INTRAVENOUS at 08:47

## 2023-09-03 RX ADMIN — MONTELUKAST 10 MG: 10 TABLET, FILM COATED ORAL at 08:46

## 2023-09-03 RX ADMIN — ESCITALOPRAM OXALATE 20 MG: 10 TABLET ORAL at 08:46

## 2023-09-03 RX ADMIN — Medication 400 MG: at 08:47

## 2023-09-03 RX ADMIN — ENOXAPARIN SODIUM 30 MG: 100 INJECTION SUBCUTANEOUS at 08:47

## 2023-09-03 RX ADMIN — Medication 2 UNITS: at 12:17

## 2023-09-03 RX ADMIN — ASPIRIN 81 MG: 81 TABLET, COATED ORAL at 08:46

## 2023-09-03 ASSESSMENT — PAIN SCALES - GENERAL
PAINLEVEL_OUTOF10: 0

## 2023-09-03 NOTE — DISCHARGE SUMMARY
Discharge Summary       PATIENT ID: Elena Woods  MRN: [id number]   YOB: 1980    DATE OF ADMISSION: 9/1/2023 12:55 PM    DATE OF DISCHARGE: 9/3/2023   PRIMARY CARE PROVIDER: Vannesa Lewis MD     ATTENDING PHYSICIAN: Bee Castro MD  DISCHARGING PROVIDER: Bee Castro MD    To contact this individual call [phone] and ask the  to page. If unavailable ask to be transferred the Adult Hospitalist Department. CONSULTATIONS: IP CONSULT TO HOSPITALIST  IP CONSULT TO NEUROLOGY    PROCEDURES/SURGERIES: * No surgery found *    ADMITTING [address] [facility], [address] COURSE:     Elena Woods is a 41 y.o. female who presented with 3 of TIA, anxiety depression, type 2 diabetes, alcohol abuse presented to the emergency room with acute onset of left facial weakness, speech difficulty and left upper extremity weakness. She was found to be hyperglycemic with BG of 290 for EMS. Evaluated in the ED for code stroke, CT head, CT perfusion, CTA head and neck were negative for acute findings. Teleneurologist were consulted, patient was felt to be not a candidate for tPA or mechanical thrombectomy, recommended admission for observation and stroke work-up. During my evaluation patient was alert oriented, able to speak clearly, no facial droop, except some questionable drift on the left upper extremity.      Acute episode of left facial droop and left-sided weakness unclear etiology, TIA vs psychogenic vs partial seizure   -feels better, left side weakness resolved, no seizure activities   -On aspirin 81 mg p.o. daily, Lipitor 80 mg p.o. daily  -CT head no acute process  -CTA head and neck no large vessel occlusion or significant flow-limiting stenosis  -CT perfusion no blood flow or volume deficits to suggest acute ischemia or tissue at ischemic risk  -MRI of the brain normal study  -Consult speech/PT/OT  -EEG 24 hrs normal study  -seen by Neurologist   Difficulty of breathing, hx of

## 2023-09-03 NOTE — DISCHARGE INSTRUCTIONS
Discharge Instructions       PATIENT ID: Yaakov Dennis  MRN: [id number]   YOB: 1980    DATE OF ADMISSION: 9/1/2023   DATE OF DISCHARGE: 9/3/2023    PRIMARY CARE PROVIDER: Darlene Mccrary     ATTENDING PHYSICIAN: Estrellita Campos MD   DISCHARGING PROVIDER: Jose Luis Rodriguez MD    To contact this individual call [phone] and ask the  to page. If unavailable ask to be transferred the Adult Hospitalist Department. DISCHARGE DIAGNOSES   Acute episode of left facial droop and left-sided weakness TIA vs Seizure   Difficulty of breathing, hx of asthma  Leukocytosis unclear etiology   T2DM with hyperglycemia   HTN   Anxiety/depression   Class III obesity    CONSULTATIONS:   Neurology    PROCEDURES/SURGERIES: * No surgery found *    PENDING TEST RESULTS:   At the time of discharge the following test results are still pending: None    FOLLOW UP APPOINTMENTS:   Darlene Mccrary MD in one week   Outpatient follow up with Neurologist in 4 weeks    ADDITIONAL CARE RECOMMENDATIONS:      DIET: diabetic diet    ACTIVITY: activity as tolerated    WOUND CARE: None    EQUIPMENT needed: None      DISCHARGE MEDICATIONS:   See Medication Reconciliation Form    It is important that you take the medication exactly as they are prescribed. Keep your medication in the bottles provided by the pharmacist and keep a list of the medication names, dosages, and times to be taken in your wallet. Do not take other medications without consulting your doctor. NOTIFY YOUR PHYSICIAN FOR ANY OF THE FOLLOWING:   Fever over 101 degrees for 24 hours. Chest pain, shortness of breath, fever, chills, nausea, vomiting, diarrhea, change in mentation, falling, weakness, bleeding. Severe pain or pain not relieved by medications. Or, any other signs or symptoms that you may have questions about.       DISPOSITION:    Home With:   OT  PT  HH  RN       SNF/Inpatient Rehab/LTAC   X Independent/assisted living

## 2023-09-04 NOTE — PROCEDURES
[facility]  EEG    Name:  Tyrel Anthony  MR#:  [id number]  :  1980  ACCOUNT #:  [de-identified]  DATE OF SERVICE:  2023      HISTORY:  The patient is a 72-year-old female who is being evaluated for recurrent episodes of slurring of speech, staring blankly, and altered awareness. She does not respond during these events and this is the third time it has occurred over the past few years. DESCRIPTION:  This is an 18-channel prolonged EEG with video monitoring performed on 2023. The recording starts at 05:56 a.m. and continues until 10:25 p.m. The dominant posterior background rhythm consists of symmetric, very well-modulated medium voltage rhythms in the 9-10 Hz frequency range out of the posterior head region. Faster frequencies along with eye blink and muscle tension artifact was seen in the frontal areas intermittently. Drowsiness is characterized by slowing and vertex waves. Stage II non-REM sleep reveals symmetric sleep spindles. Deeper stages of sleep were characterized by predominance of delta frequencies. No clinical seizure-like events were captured on the video. EEG SUMMARY:  Normal study. CLINICAL INTERPRETATION:  This is a normal prolonged EEG with video monitoring. No lateralizing or epileptiform features were noted. No clinical or electrographic seizures were seen.       Raisa Herron MD      [id number]  D:  2023 10:17  T:  2023 17:52  JOB #:  [id number]

## 2023-09-05 DIAGNOSIS — F33.2 SEVERE EPISODE OF RECURRENT MAJOR DEPRESSIVE DISORDER, WITHOUT PSYCHOTIC FEATURES (HCC): ICD-10-CM

## 2023-09-05 DIAGNOSIS — F43.10 PTSD (POST-TRAUMATIC STRESS DISORDER): Primary | ICD-10-CM

## 2023-09-05 RX ORDER — CLONAZEPAM 0.5 MG/1
0.25 TABLET ORAL 2 TIMES DAILY PRN
Qty: 30 TABLET | Refills: 0 | Status: SHIPPED | OUTPATIENT
Start: 2023-09-05 | End: 2023-10-05

## 2023-09-22 ENCOUNTER — OFFICE VISIT (OUTPATIENT)
Age: 43
End: 2023-09-22
Payer: MEDICAID

## 2023-09-22 VITALS
OXYGEN SATURATION: 96 % | HEART RATE: 85 BPM | DIASTOLIC BLOOD PRESSURE: 59 MMHG | HEIGHT: 65 IN | RESPIRATION RATE: 18 BRPM | WEIGHT: 293 LBS | TEMPERATURE: 97.1 F | BODY MASS INDEX: 48.82 KG/M2 | SYSTOLIC BLOOD PRESSURE: 100 MMHG

## 2023-09-22 DIAGNOSIS — Z79.4 TYPE 2 DIABETES MELLITUS WITH HYPERGLYCEMIA, WITH LONG-TERM CURRENT USE OF INSULIN (HCC): ICD-10-CM

## 2023-09-22 DIAGNOSIS — K21.9 GASTROESOPHAGEAL REFLUX DISEASE, UNSPECIFIED WHETHER ESOPHAGITIS PRESENT: ICD-10-CM

## 2023-09-22 DIAGNOSIS — F43.21 ADJUSTMENT DISORDER WITH DEPRESSED MOOD: ICD-10-CM

## 2023-09-22 DIAGNOSIS — I10 ESSENTIAL (PRIMARY) HYPERTENSION: ICD-10-CM

## 2023-09-22 DIAGNOSIS — Z09 HOSPITAL DISCHARGE FOLLOW-UP: Primary | ICD-10-CM

## 2023-09-22 DIAGNOSIS — F43.10 PTSD (POST-TRAUMATIC STRESS DISORDER): ICD-10-CM

## 2023-09-22 DIAGNOSIS — J45.50 SEVERE PERSISTENT ASTHMA, UNCOMPLICATED: ICD-10-CM

## 2023-09-22 DIAGNOSIS — F33.2 SEVERE EPISODE OF RECURRENT MAJOR DEPRESSIVE DISORDER, WITHOUT PSYCHOTIC FEATURES (HCC): ICD-10-CM

## 2023-09-22 DIAGNOSIS — E11.65 TYPE 2 DIABETES MELLITUS WITH HYPERGLYCEMIA, WITH LONG-TERM CURRENT USE OF INSULIN (HCC): ICD-10-CM

## 2023-09-22 LAB — HBA1C MFR BLD: 9.4 %

## 2023-09-22 PROCEDURE — PBSHW AMB POC HEMOGLOBIN A1C: Performed by: FAMILY MEDICINE

## 2023-09-22 PROCEDURE — 3078F DIAST BP <80 MM HG: CPT | Performed by: FAMILY MEDICINE

## 2023-09-22 PROCEDURE — 83036 HEMOGLOBIN GLYCOSYLATED A1C: CPT | Performed by: FAMILY MEDICINE

## 2023-09-22 PROCEDURE — 99215 OFFICE O/P EST HI 40 MIN: CPT | Performed by: FAMILY MEDICINE

## 2023-09-22 PROCEDURE — 3074F SYST BP LT 130 MM HG: CPT | Performed by: FAMILY MEDICINE

## 2023-09-22 PROCEDURE — 3046F HEMOGLOBIN A1C LEVEL >9.0%: CPT | Performed by: FAMILY MEDICINE

## 2023-09-22 RX ORDER — RISPERIDONE 3 MG/1
3 TABLET ORAL DAILY
Qty: 90 TABLET | Refills: 1 | Status: SHIPPED | OUTPATIENT
Start: 2023-09-22

## 2023-09-22 RX ORDER — BUDESONIDE, GLYCOPYRROLATE, AND FORMOTEROL FUMARATE 160; 9; 4.8 UG/1; UG/1; UG/1
1 AEROSOL, METERED RESPIRATORY (INHALATION) 2 TIMES DAILY
Qty: 3 EACH | Refills: 1 | Status: SHIPPED | OUTPATIENT
Start: 2023-09-22

## 2023-09-22 RX ORDER — ATORVASTATIN CALCIUM 80 MG/1
80 TABLET, FILM COATED ORAL NIGHTLY
Qty: 30 TABLET | Refills: 3 | Status: SHIPPED | OUTPATIENT
Start: 2023-09-22

## 2023-09-22 RX ORDER — GLIPIZIDE 5 MG/1
5 TABLET ORAL
Qty: 90 TABLET | Refills: 0 | Status: SHIPPED | OUTPATIENT
Start: 2023-09-22

## 2023-09-22 RX ORDER — AMLODIPINE BESYLATE 10 MG/1
10 TABLET ORAL DAILY
Qty: 90 TABLET | Refills: 1 | Status: SHIPPED | OUTPATIENT
Start: 2023-09-22

## 2023-09-22 RX ORDER — LOSARTAN POTASSIUM AND HYDROCHLOROTHIAZIDE 12.5; 1 MG/1; MG/1
1 TABLET ORAL EVERY MORNING
Qty: 90 TABLET | Refills: 1 | Status: SHIPPED | OUTPATIENT
Start: 2023-09-22

## 2023-09-22 RX ORDER — BUDESONIDE 1 MG/2ML
1000 INHALANT ORAL 2 TIMES DAILY PRN
Qty: 2 ML | Refills: 4 | Status: SHIPPED | OUTPATIENT
Start: 2023-09-22

## 2023-09-22 RX ORDER — DULAGLUTIDE 0.75 MG/.5ML
0.75 INJECTION, SOLUTION SUBCUTANEOUS WEEKLY
Qty: 6 ML | Refills: 1 | Status: SHIPPED | OUTPATIENT
Start: 2023-09-22 | End: 2023-09-22 | Stop reason: DRUGHIGH

## 2023-09-22 RX ORDER — PANTOPRAZOLE SODIUM 40 MG/1
40 TABLET, DELAYED RELEASE ORAL DAILY
Qty: 90 TABLET | Refills: 1 | Status: SHIPPED | OUTPATIENT
Start: 2023-09-22

## 2023-09-22 RX ORDER — CLONAZEPAM 0.5 MG/1
0.25 TABLET ORAL 2 TIMES DAILY PRN
Qty: 30 TABLET | Refills: 1 | Status: SHIPPED | OUTPATIENT
Start: 2023-09-22 | End: 2023-11-21

## 2023-09-22 RX ORDER — ALBUTEROL SULFATE 90 UG/1
2 AEROSOL, METERED RESPIRATORY (INHALATION) EVERY 4 HOURS PRN
Qty: 3 EACH | Refills: 0 | Status: SHIPPED | OUTPATIENT
Start: 2023-09-22

## 2023-09-22 RX ORDER — MONTELUKAST SODIUM 10 MG/1
10 TABLET ORAL DAILY
Qty: 90 TABLET | Refills: 3 | Status: SHIPPED | OUTPATIENT
Start: 2023-09-22

## 2023-09-22 RX ORDER — INSULIN HUMAN 100 [IU]/ML
INJECTION, SUSPENSION SUBCUTANEOUS
Qty: 15 ADJUSTABLE DOSE PRE-FILLED PEN SYRINGE | Refills: 0 | Status: SHIPPED | OUTPATIENT
Start: 2023-09-22

## 2023-09-22 RX ORDER — DULAGLUTIDE 1.5 MG/.5ML
1.5 INJECTION, SOLUTION SUBCUTANEOUS WEEKLY
Qty: 6 ML | Refills: 0 | Status: SHIPPED | OUTPATIENT
Start: 2023-09-22

## 2023-09-22 RX ORDER — BUPROPION HYDROCHLORIDE 300 MG/1
300 TABLET ORAL DAILY
Qty: 90 TABLET | Refills: 1 | Status: SHIPPED | OUTPATIENT
Start: 2023-09-22

## 2023-09-22 RX ORDER — ACYCLOVIR 400 MG/1
TABLET ORAL
Qty: 3 EACH | Refills: 5 | Status: SHIPPED | OUTPATIENT
Start: 2023-09-22

## 2023-09-22 RX ORDER — ESCITALOPRAM OXALATE 20 MG/1
20 TABLET ORAL DAILY
Qty: 90 TABLET | Refills: 1 | Status: SHIPPED | OUTPATIENT
Start: 2023-09-22

## 2023-09-22 RX ORDER — ATENOLOL 50 MG/1
TABLET ORAL
Qty: 90 TABLET | Refills: 1 | Status: SHIPPED | OUTPATIENT
Start: 2023-09-22

## 2023-09-22 RX ORDER — MAGNESIUM OXIDE 400 MG/1
400 TABLET ORAL DAILY
Qty: 90 TABLET | Refills: 1 | Status: SHIPPED | OUTPATIENT
Start: 2023-09-22

## 2023-09-22 ASSESSMENT — PATIENT HEALTH QUESTIONNAIRE - PHQ9
SUM OF ALL RESPONSES TO PHQ QUESTIONS 1-9: 2
1. LITTLE INTEREST OR PLEASURE IN DOING THINGS: 1
SUM OF ALL RESPONSES TO PHQ QUESTIONS 1-9: 2
2. FEELING DOWN, DEPRESSED OR HOPELESS: 1
SUM OF ALL RESPONSES TO PHQ QUESTIONS 1-9: 2
SUM OF ALL RESPONSES TO PHQ9 QUESTIONS 1 & 2: 2
SUM OF ALL RESPONSES TO PHQ QUESTIONS 1-9: 2

## 2023-10-04 ENCOUNTER — TELEPHONE (OUTPATIENT)
Age: 43
End: 2023-10-04

## 2023-10-04 NOTE — TELEPHONE ENCOUNTER
Called patient to follow up on Status of completing the Bariatric Seminar. Patient was sent the seminar on 9/22 and has not yet been watched. No answer, left voicemail advising patient to return call.

## 2023-10-04 NOTE — TELEPHONE ENCOUNTER
Called the home number listed on the patients chart to follow up on the status of Bariatric Seminar completion. Patients mother answered the phone and no information was disclosed due to HIPAA.

## 2023-12-15 ENCOUNTER — OFFICE VISIT (OUTPATIENT)
Age: 43
End: 2023-12-15
Payer: MEDICAID

## 2023-12-15 VITALS — HEIGHT: 64 IN | WEIGHT: 293 LBS | BODY MASS INDEX: 50.02 KG/M2

## 2023-12-15 DIAGNOSIS — M54.16 ACUTE RADICULAR LOW BACK PAIN: Primary | ICD-10-CM

## 2023-12-15 PROCEDURE — 99214 OFFICE O/P EST MOD 30 MIN: CPT | Performed by: STUDENT IN AN ORGANIZED HEALTH CARE EDUCATION/TRAINING PROGRAM

## 2023-12-15 RX ORDER — METHYLPREDNISOLONE 4 MG/1
TABLET ORAL
Qty: 21 TABLET | Refills: 0 | Status: SHIPPED | OUTPATIENT
Start: 2023-12-15 | End: 2023-12-21

## 2023-12-15 RX ORDER — NALOXONE HYDROCHLORIDE 2 MG/.4ML
2 INJECTION, SOLUTION INTRAMUSCULAR; SUBCUTANEOUS
Qty: 0.4 ML | Refills: 2 | Status: SHIPPED | OUTPATIENT
Start: 2023-12-15 | End: 2023-12-15

## 2023-12-15 RX ORDER — OXYCODONE HYDROCHLORIDE AND ACETAMINOPHEN 5; 325 MG/1; MG/1
1 TABLET ORAL EVERY 12 HOURS PRN
Qty: 15 TABLET | Refills: 0 | Status: SHIPPED | OUTPATIENT
Start: 2023-12-15 | End: 2023-12-29

## 2023-12-15 RX ORDER — ATORVASTATIN CALCIUM 40 MG/1
40 TABLET, FILM COATED ORAL DAILY
COMMUNITY

## 2023-12-15 RX ORDER — BUPROPION HYDROCHLORIDE 150 MG/1
150 TABLET ORAL EVERY MORNING
COMMUNITY

## 2023-12-15 RX ORDER — DULAGLUTIDE 0.75 MG/.5ML
0.75 INJECTION, SOLUTION SUBCUTANEOUS WEEKLY
COMMUNITY

## 2023-12-15 NOTE — PROGRESS NOTES
[age] y  Bypass  [facility] ZULMA Godinez. [address] [facility], [address] [place] [address]  [phone]    C/C: Back pain    HPI:    Kandis Noe is a 41 y.o. Black / [place] American  female who presents to clinic today for evaluation of the issues listed above. Pt is new to the practice. Previous pcp: Dr. Shana White; Patient presenting for initial office visit with me today. PMH sig for severe eosinophilic asthma, DM2, HTN, TIA, depression (followed by psych), seasonal and environmental allergies, visual difficulties (from hydrops, corneal edema, and keratoconus), and obesity who presents for routine follow up on chronic medical conditions     Back pain:  States that it started about 3 weeks ago  States that she went to [facility] on 12/06/2023 for evaluation. Underwent imaging (Xray) which was unremarkable. UA was negative. Pt was given a dose of steroid injection, oxycodone and muscle relaxants. Mainly on the left lower back and radiates down the left leg. Pt denies any loss of bowel or bladder control. Pt denies any  fever, chill, chest pain, SOB, abdominal pain, n/v/d, HA or dizziness. Other Health Habits and social history:  Oldest daughter to one of our nurses; Anai Gonzalez LPN    Allergies- reviewed:    Allergies   Allergen Reactions    Tetanus Toxoids Anaphylaxis    Metformin Other (See Comments)     Hair loss and unstable blood sugars    Penicillins Swelling       Past Medical History- reviewed:  Past Medical History:   Diagnosis Date    Alcohol abuse     Anxiety and depression     Asthma     DM (diabetes mellitus), type 2 ([address] [facility])     Morbid obesity ([address]) 10/28/2014    Stroke [facility])        Family History - reviewed:  Family History   Problem Relation Age of Onset    Cancer Maternal Grandfather     Hypertension Maternal Grandmother     Thyroid Disease Maternal Uncle     Hypertension Maternal Uncle     Hypertension Maternal Aunt     Arrhythmia

## 2023-12-27 ENCOUNTER — CLINICAL DOCUMENTATION (OUTPATIENT)
Age: 43
End: 2023-12-27

## 2024-01-02 LAB — MAMMOGRAPHY, EXTERNAL: NORMAL

## 2024-01-10 ENCOUNTER — OFFICE VISIT (OUTPATIENT)
Age: 44
End: 2024-01-10
Payer: MEDICAID

## 2024-01-10 VITALS
WEIGHT: 293 LBS | SYSTOLIC BLOOD PRESSURE: 114 MMHG | RESPIRATION RATE: 17 BRPM | HEART RATE: 96 BPM | HEIGHT: 65 IN | BODY MASS INDEX: 48.82 KG/M2 | TEMPERATURE: 98.6 F | DIASTOLIC BLOOD PRESSURE: 70 MMHG

## 2024-01-10 DIAGNOSIS — M79.2 NEUROPATHIC PAIN: ICD-10-CM

## 2024-01-10 DIAGNOSIS — E11.65 TYPE 2 DIABETES MELLITUS WITH HYPERGLYCEMIA, WITH LONG-TERM CURRENT USE OF INSULIN (HCC): Primary | ICD-10-CM

## 2024-01-10 DIAGNOSIS — Z79.4 TYPE 2 DIABETES MELLITUS WITH HYPERGLYCEMIA, WITH LONG-TERM CURRENT USE OF INSULIN (HCC): Primary | ICD-10-CM

## 2024-01-10 LAB — HBA1C MFR BLD: 8.1 %

## 2024-01-10 PROCEDURE — PBSHW AMB POC HEMOGLOBIN A1C: Performed by: STUDENT IN AN ORGANIZED HEALTH CARE EDUCATION/TRAINING PROGRAM

## 2024-01-10 PROCEDURE — 99214 OFFICE O/P EST MOD 30 MIN: CPT | Performed by: STUDENT IN AN ORGANIZED HEALTH CARE EDUCATION/TRAINING PROGRAM

## 2024-01-10 PROCEDURE — 3074F SYST BP LT 130 MM HG: CPT | Performed by: STUDENT IN AN ORGANIZED HEALTH CARE EDUCATION/TRAINING PROGRAM

## 2024-01-10 PROCEDURE — 83036 HEMOGLOBIN GLYCOSYLATED A1C: CPT | Performed by: STUDENT IN AN ORGANIZED HEALTH CARE EDUCATION/TRAINING PROGRAM

## 2024-01-10 PROCEDURE — 3078F DIAST BP <80 MM HG: CPT | Performed by: STUDENT IN AN ORGANIZED HEALTH CARE EDUCATION/TRAINING PROGRAM

## 2024-01-10 RX ORDER — GABAPENTIN 300 MG/1
300 CAPSULE ORAL NIGHTLY
Qty: 90 CAPSULE | Refills: 0 | Status: SHIPPED | OUTPATIENT
Start: 2024-01-10 | End: 2024-07-08

## 2024-01-10 RX ORDER — GLIPIZIDE 5 MG/1
5 TABLET ORAL
Qty: 90 TABLET | Refills: 1 | Status: SHIPPED | OUTPATIENT
Start: 2024-01-10

## 2024-01-10 ASSESSMENT — PATIENT HEALTH QUESTIONNAIRE - PHQ9
9. THOUGHTS THAT YOU WOULD BE BETTER OFF DEAD, OR OF HURTING YOURSELF: 0
5. POOR APPETITE OR OVEREATING: 0
SUM OF ALL RESPONSES TO PHQ QUESTIONS 1-9: 0
3. TROUBLE FALLING OR STAYING ASLEEP: 0
SUM OF ALL RESPONSES TO PHQ9 QUESTIONS 1 & 2: 0
6. FEELING BAD ABOUT YOURSELF - OR THAT YOU ARE A FAILURE OR HAVE LET YOURSELF OR YOUR FAMILY DOWN: 0
2. FEELING DOWN, DEPRESSED OR HOPELESS: 0
SUM OF ALL RESPONSES TO PHQ QUESTIONS 1-9: 0
8. MOVING OR SPEAKING SO SLOWLY THAT OTHER PEOPLE COULD HAVE NOTICED. OR THE OPPOSITE, BEING SO FIGETY OR RESTLESS THAT YOU HAVE BEEN MOVING AROUND A LOT MORE THAN USUAL: 0
4. FEELING TIRED OR HAVING LITTLE ENERGY: 0
SUM OF ALL RESPONSES TO PHQ QUESTIONS 1-9: 0
7. TROUBLE CONCENTRATING ON THINGS, SUCH AS READING THE NEWSPAPER OR WATCHING TELEVISION: 0
1. LITTLE INTEREST OR PLEASURE IN DOING THINGS: 0
SUM OF ALL RESPONSES TO PHQ QUESTIONS 1-9: 0
10. IF YOU CHECKED OFF ANY PROBLEMS, HOW DIFFICULT HAVE THESE PROBLEMS MADE IT FOR YOU TO DO YOUR WORK, TAKE CARE OF THINGS AT HOME, OR GET ALONG WITH OTHER PEOPLE: 0

## 2024-01-10 NOTE — PROGRESS NOTES
Chief Complaint   Patient presents with    Follow-up     12/15/2023 Acute radicular low back pain       The patient, Wendy France, identity was verified by name and , pharmacy verified  Labs:Yes  Fasting:No-HP Labs    \"Have you been to the ER, urgent care clinic since your last visit?  Hospitalized since your last visit?\"    NO    “Have you seen or consulted any other health care providers outside of [facility] since your last visit?”      Yes  Pulmonology  Dr. Charlotte Castro        There were no vitals filed for this visit.  Health Maintenance Due   Topic Date Due    Hepatitis B vaccine (1 of 3 - 3-dose series) Never done    Varicella vaccine (1 of 2 - 2-dose childhood series) Never done    Pneumococcal 0-64 years Vaccine (1 - PCV) Never done    Cervical cancer screen  Never done    Diabetic foot exam  2017    Diabetic retinal exam  2022    Flu vaccine (1) 2023    COVID-19 Vaccine (3 - - season) 2023    A1C test (Diabetic or Prediabetic)  2023

## 2024-01-10 NOTE — PROGRESS NOTES
[facility]  [address] [place] [address] [phone]    C/C: DM    HPI:    Wendy France is a 43 y.o.  Black /   female who presents to clinic today for evaluation of the issues listed above.     Subjective;    Patient presenting for initial office visit with me today.     PMH sig for severe eosinophilic asthma, DM2, HTN, TIA, depression (followed by psych), seasonal and environmental allergies, visual difficulties (from hydrops, corneal edema, and keratoconus), and obesity who presents for routine follow up on chronic medical conditions:      DM:  Diagnosed around the age of 20.  Currently on Trulicity 1.5 mg weekly and Glipizide 5mg daily.  Uses insulin only when on steroids.    Back pain:  Much better since our last visit.  Yet to schedule for PT and spine.  Mainly on the left lower back and radiates down the left leg.  Pt denies any loss of bowel or bladder control.    Pt denies any  fever, chill, chest pain, SOB, abdominal pain, n/v/d, HA or dizziness.     Other Health Habits and social history:  Oldest daughter to one of our nurses; Beverly Velazquez LPN    Other doctors:  -pulm - for asthma - on dupixent  - Ophthalmology - for keratoconus (needs a corneal transplant).    Allergies- reviewed:   Allergies   Allergen Reactions    Tetanus Toxoids Anaphylaxis    Metformin Other (See Comments)     Hair loss and unstable blood sugars    Penicillins Swelling       Past Medical History- reviewed:  Past Medical History:   Diagnosis Date    Alcohol abuse     Anxiety and depression     Asthma     DM (diabetes mellitus), type 2 ([facility])     Morbid obesity ([facility]) 10/28/2014    Stroke ([facility])        Family History - reviewed:  Family History   Problem Relation Age of Onset    Cancer Maternal Grandfather     Hypertension Maternal Grandmother     Thyroid Disease Maternal Uncle     Hypertension Maternal Uncle     Hypertension Maternal Aunt

## 2024-01-16 ENCOUNTER — HOSPITAL ENCOUNTER (OUTPATIENT)
Facility: HOSPITAL | Age: 44
Setting detail: RECURRING SERIES
Discharge: HOME | End: 2024-01-19
Attending: STUDENT IN AN ORGANIZED HEALTH CARE EDUCATION/TRAINING PROGRAM
Payer: MEDICAID

## 2024-01-16 PROCEDURE — 97162 PT EVAL MOD COMPLEX 30 MIN: CPT | Performed by: PHYSICAL THERAPIST

## 2024-01-16 PROCEDURE — 97110 THERAPEUTIC EXERCISES: CPT | Performed by: PHYSICAL THERAPIST

## 2024-01-16 NOTE — PROGRESS NOTES
low to the ground). She has only about 2 days during the week where she has 6/10 pain (her lowest pain). She feels 40% to her baseline.      Start of Care: 1/16/2024  Onset Date: November/December 2023  PLOF: 20 min of exercise at least 3x/wk  PMHx/Surgical Hx/Comorbidites: allergies, anxiety, asthma, back pain, BMI over 30, depression, DM II, HTN, TIA, visual impairment  Pt Goals: decrease pain  Motivation: good     OBJECTIVE  Posture:  scapular protraction bilaterally  Other Observations:  leaning to right side while sitting; generally decreased tolerance to movement  Gait and Functional Mobility:  bilateral toe out; slow larissa; minimal trunk rotation; antalgic, decreased foot clearance bilaterally  Palpation: hypersensitive to palpate left lumbar paraspinals and upper glutes        Lumbar AROM:          R      L    Flexion    50%, p!    Extension   Limited, p!      Side Bending   Limited, decreased left radicular sx  Very limited, p! Down left    Rotation   Limited, p!     Very limited, p!        LOWER QUARTER   MUSCLE STRENGTH  KEY       R  L  0 - No Contraction  L1, L2 Psoas  4  4  1 - Trace   L3 Quads  4  4  2 - Poor   L4 Tib Ant  4  4  3 - Fair    L5 EHL  4  4  4 - Good   S1 Peroneals  4  4  5 - Normal   S2 Hams  4-  4-    Flexibility: tight hip flexors and hamstrings  Mobility Assessment: hypomobile lumbar      MMT:               HIP ER: 3/5 bilaterally              HIP Abd: 3/5 bilaterally  Neurological: Reflexes / Sensations: nt  Special Tests:      SLR: +     5x STS: 42s  30s STS: 3x  SLS: R= 9s; L= unable, p!  Tandem: R (fwd)= 3s; L (fwd)= 4s      Objective/Functional Outcome Measure:  FOTO Score: 24  FOTO score = an established functional score where 100 = no disability      20 min [x]Eval - untimed                        Therapeutic Procedures:  Tx Min Billable or 1:1 Min (if diff from Tx Min) Procedure, Rationale, Specifics   [id number] Therapeutic Exercise (timed):  increase ROM, strength,

## 2024-01-19 ENCOUNTER — HOSPITAL ENCOUNTER (OUTPATIENT)
Facility: HOSPITAL | Age: 44
Setting detail: RECURRING SERIES
Discharge: HOME | End: 2024-01-22
Attending: STUDENT IN AN ORGANIZED HEALTH CARE EDUCATION/TRAINING PROGRAM
Payer: MEDICAID

## 2024-01-19 PROCEDURE — 97110 THERAPEUTIC EXERCISES: CPT | Performed by: PHYSICAL THERAPIST

## 2024-01-19 PROCEDURE — G0283 ELEC STIM OTHER THAN WOUND: HCPCS | Performed by: PHYSICAL THERAPIST

## 2024-01-19 NOTE — PROGRESS NOTES
min []  Mechanical Traction,        type/lbs:        []  pro      []  sup           []  int       []  cont            []  before manual           []  after manual     min []  Ultrasound,         settings/location:      min  unbilled []  Ice     []  Heat            location/position:         min []  Vasopneumatic Device,      press/temp:   pre-treatment girth :    post-treatment girth :    measured at (landmark       location) :   If using vaso (only need to measure limb vaso being performed on)        min []  Other:        Skin assessment post-treatment (if applicable):    [x]  intact    []  redness- no adverse reaction                 []redness - adverse reaction:          [x]  Patient Education billed concurrently with other procedures   [x] Review HEP    [] Progressed/Changed HEP, detail:    [] Other detail:         Other Objective/Functional Measures  None noted    Pain Level at end of session (0-10 scale): 8      Assessment   The patient progressed with tolerance to therex overall.   Patient will continue to benefit from skilled PT / OT services to modify and progress therapeutic interventions, analyze and address functional mobility deficits, analyze and address ROM deficits, analyze and address strength deficits, analyze and address soft tissue restrictions, analyze and cue for proper movement patterns, analyze and modify for postural abnormalities, analyze and address imbalance/dizziness, and instruct in home and community integration to address functional deficits and attain remaining goals.    Progress toward goals / Updated goals:  []  See Progress Note/Recertification    The patient is progressing towards goals.      PLAN  Yes  Continue plan of care  Re-Cert Due: 3/16/24  [x]  Upgrade activities as tolerated  []  Discharge due to :  []  Other:      ANJUM PERSAUD, PT       1/19/2024       6:52 AM

## 2024-01-26 ENCOUNTER — HOSPITAL ENCOUNTER (OUTPATIENT)
Facility: HOSPITAL | Age: 44
Setting detail: RECURRING SERIES
Discharge: HOME | End: 2024-01-29
Attending: STUDENT IN AN ORGANIZED HEALTH CARE EDUCATION/TRAINING PROGRAM
Payer: MEDICAID

## 2024-01-26 PROCEDURE — G0283 ELEC STIM OTHER THAN WOUND: HCPCS | Performed by: PHYSICAL THERAPIST

## 2024-01-26 PROCEDURE — 97110 THERAPEUTIC EXERCISES: CPT | Performed by: PHYSICAL THERAPIST

## 2024-01-26 NOTE — PROGRESS NOTES
PHYSICAL THERAPY - DAILY TREATMENT NOTE (updated 3/23)      Date: 2024          Patient Name:  Wendy France :  1980   Medical   Diagnosis:  Acute radicular low back pain [M54.16] Treatment Diagnosis:  M54.42  LUMBAGO WITH SCIATICA, LEFT SIDE    Referral Source:  Alex Johnson MD Insurance:   Payor: [facility] MEDICAID / Plan: AGELON ?[facility] [employer] PLAN([facility]) / Product Type: *No Product type* /                     Patient  verified yes     Visit #   Current  / Total 3 24   Time   In / Out 7:02am 7:56am   Total Treatment Time 54   Total Timed Codes 39         SUBJECTIVE    Pain Level (0-10 scale): 10    Any medication changes, allergies to medications, adverse drug reactions, diagnosis change, or new procedure performed?: [x] No    [] Yes (see summary sheet for update)  Medications: Verified on Patient Summary List    Subjective functional status/changes:     The patient reports that she did try some of the exercises at home. She started to have a lot of increased pain starting yesterday. She doesn't have an appointment with ortho until early March.    OBJECTIVE      Therapeutic Procedures:  Tx Min Billable or 1:1 Min (if diff from Tx Min) Procedure, Rationale, Specifics   39  [id number] Therapeutic Exercise (timed):  increase ROM, strength, coordination, balance, and proprioception to improve patient's ability to progress to PLOF and address remaining functional goals. (see flow sheet as applicable)     Details if applicable:            Details if applicable:           Details if applicable:           Details if applicable:            Details if applicable:     39     Total Total         Modalities Rationale:     decrease edema, decrease inflammation, decrease pain, and increase tissue extensibility to improve patient's ability to progress to PLOF and address remaining functional goals.    15   min [x] Estim Unattended,             type/location: IFC to lumbar in prone       []  w/ice    [x]

## 2024-01-31 ENCOUNTER — CLINICAL DOCUMENTATION (OUTPATIENT)
Age: 44
End: 2024-01-31

## 2024-01-31 DIAGNOSIS — M54.16 LUMBAR RADICULOPATHY: Primary | ICD-10-CM

## 2024-01-31 DIAGNOSIS — G89.29 CHRONIC BILATERAL LOW BACK PAIN WITH SCIATICA, SCIATICA LATERALITY UNSPECIFIED: ICD-10-CM

## 2024-01-31 DIAGNOSIS — M54.40 CHRONIC BILATERAL LOW BACK PAIN WITH SCIATICA, SCIATICA LATERALITY UNSPECIFIED: ICD-10-CM

## 2024-01-31 NOTE — PROGRESS NOTES
Sent to order for: Continuous Blood Gluc Sensor (DEXCOM G7 SENSOR) MISC through [facility].    Per [facility] order accepted.

## 2024-02-02 ENCOUNTER — HOSPITAL ENCOUNTER (OUTPATIENT)
Facility: HOSPITAL | Age: 44
Setting detail: RECURRING SERIES
Discharge: HOME | End: 2024-02-05
Attending: STUDENT IN AN ORGANIZED HEALTH CARE EDUCATION/TRAINING PROGRAM
Payer: MEDICAID

## 2024-02-02 PROCEDURE — G0283 ELEC STIM OTHER THAN WOUND: HCPCS | Performed by: PHYSICAL THERAPIST

## 2024-02-02 PROCEDURE — 97110 THERAPEUTIC EXERCISES: CPT | Performed by: PHYSICAL THERAPIST

## 2024-02-02 NOTE — THERAPY DISCHARGE
[facility]  [address]  [place] [address]  Phone: [phone]   Fax: [phone]     DISCHARGE SUMMARY  Patient Name: Wendy France : 1980   Treatment/Medical Diagnosis: Acute radicular low back pain [M54.16]   Referral Source: Alex Johnson MD     Date of Initial Visit: 24 Attended Visits: 4 Missed Visits: 2     SUMMARY OF TREATMENT  Therapy has included therex and modalities to assist with bilateral lumbar radiculopathy symptoms (L>R).    CURRENT STATUS  The patient has not progressed with therapeutic interventions. Her pain has been 10/10  for the past week and she feels like she's getting worse. She's taken gabapentin for the past 1-2 days, but hasn't seen much difference. As of note, her right glute started to visibly spasm for almost 1 minute while lying in prone today (which is more comfortable than lying supine), until she changed the position of her right leg by bringing her knee up to her side. She has an MRI of the lumbar spine scheduled on 24 (probable discogenic issues), and is seeing ortho next month. She has therex to attempt to progress her overall mobility and maintain strength, but it is uncertain how much she will progress without more aggressive interventions, so she will be discharged to her HEP.    Short Term Goals: To be accomplished in 2 treatments:                         1.) The patient will be independent with their HEP consistently for at least one week.- MET  Long Term Goals: To be accomplished in 24 treatments:                         1.) The patient will have at most 7/10 pain with daily activities.- Not MET                         2.) The patient will be able to ambulate and/or sit for for at least 10min without having to change positions due to pain.- Not MET                         3.) The patient will improve their FOTO score from 24 to at least 35 to show

## 2024-02-02 NOTE — PROGRESS NOTES
PHYSICAL THERAPY - DAILY TREATMENT NOTE (updated 3/23)      Date: 2024          Patient Name:  Wendy France :  1980   Medical   Diagnosis:  Acute radicular low back pain [M54.16] Treatment Diagnosis:  M54.42  LUMBAGO WITH SCIATICA, LEFT SIDE    Referral Source:  Alex Johnson MD Insurance:   Payor: [facility] MEDICAID / Plan: [facility]) / Product Type: *No Product type* /                     Patient  verified yes     Visit #   Current  / Total 4 24   Time   In / Out 7:10am 7:50am   Total Treatment Time 40   Total Timed Codes 25         SUBJECTIVE    Pain Level (0-10 scale): 10    Any medication changes, allergies to medications, adverse drug reactions, diagnosis change, or new procedure performed?: [x] No    [] Yes (see summary sheet for update)  Medications: Verified on Patient Summary List    Subjective functional status/changes:     The patient reports that she feels like it's getting worse. She has an MRI scheduled for the .    OBJECTIVE      Therapeutic Procedures:  Tx Min Billable or 1:1 Min (if diff from Tx Min) Procedure, Rationale, Specifics   25  [id number] Therapeutic Exercise (timed):  increase ROM, strength, coordination, balance, and proprioception to improve patient's ability to progress to PLOF and address remaining functional goals. (see flow sheet as applicable)     Details if applicable:            Details if applicable:           Details if applicable:           Details if applicable:            Details if applicable:     25     Total Total         Modalities Rationale:     decrease edema, decrease inflammation, decrease pain, and increase tissue extensibility to improve patient's ability to progress to PLOF and address remaining functional goals.    15   min [x] Estim Unattended,             type/location: IFC to lumbar in prone       []  w/ice    [x]  w/heat        min [] Estim Attended,             type/location:       []  w/ice   []  w/heat

## 2024-02-16 ENCOUNTER — CLINICAL DOCUMENTATION (OUTPATIENT)
Age: 44
End: 2024-02-16

## 2024-02-16 NOTE — PROGRESS NOTES
Faxed to:  [facility] Stage Verification Form ([phone]    Faxed to [facility] [phone]

## 2024-02-19 ENCOUNTER — CLINICAL DOCUMENTATION (OUTPATIENT)
Age: 44
End: 2024-02-19

## 2024-04-05 ENCOUNTER — HOSPITAL ENCOUNTER (OUTPATIENT)
Facility: HOSPITAL | Age: 44
End: 2024-04-05
Attending: STUDENT IN AN ORGANIZED HEALTH CARE EDUCATION/TRAINING PROGRAM
Payer: MEDICAID

## 2024-04-05 DIAGNOSIS — M54.40 CHRONIC BILATERAL LOW BACK PAIN WITH SCIATICA, SCIATICA LATERALITY UNSPECIFIED: ICD-10-CM

## 2024-04-05 DIAGNOSIS — M54.16 LUMBAR RADICULOPATHY: ICD-10-CM

## 2024-04-05 DIAGNOSIS — G89.29 CHRONIC BILATERAL LOW BACK PAIN WITH SCIATICA, SCIATICA LATERALITY UNSPECIFIED: ICD-10-CM

## 2024-04-05 PROCEDURE — 72148 MRI LUMBAR SPINE W/O DYE: CPT

## 2024-04-11 ENCOUNTER — OFFICE VISIT (OUTPATIENT)
Age: 44
End: 2024-04-11
Payer: MEDICAID

## 2024-04-11 VITALS
RESPIRATION RATE: 18 BRPM | OXYGEN SATURATION: 96 % | DIASTOLIC BLOOD PRESSURE: 82 MMHG | WEIGHT: 293 LBS | HEART RATE: 75 BPM | BODY MASS INDEX: 50.02 KG/M2 | SYSTOLIC BLOOD PRESSURE: 131 MMHG | TEMPERATURE: 98.7 F | HEIGHT: 64 IN

## 2024-04-11 DIAGNOSIS — F43.21 ADJUSTMENT DISORDER WITH DEPRESSED MOOD: ICD-10-CM

## 2024-04-11 DIAGNOSIS — I10 ESSENTIAL (PRIMARY) HYPERTENSION: ICD-10-CM

## 2024-04-11 DIAGNOSIS — K21.9 GASTROESOPHAGEAL REFLUX DISEASE, UNSPECIFIED WHETHER ESOPHAGITIS PRESENT: ICD-10-CM

## 2024-04-11 DIAGNOSIS — J45.50 SEVERE PERSISTENT ASTHMA, UNCOMPLICATED: ICD-10-CM

## 2024-04-11 DIAGNOSIS — Z79.4 TYPE 2 DIABETES MELLITUS WITH HYPERGLYCEMIA, WITH LONG-TERM CURRENT USE OF INSULIN (HCC): Primary | ICD-10-CM

## 2024-04-11 DIAGNOSIS — M79.2 NEUROPATHIC PAIN: ICD-10-CM

## 2024-04-11 DIAGNOSIS — E11.65 TYPE 2 DIABETES MELLITUS WITH HYPERGLYCEMIA, WITH LONG-TERM CURRENT USE OF INSULIN (HCC): Primary | ICD-10-CM

## 2024-04-11 DIAGNOSIS — E78.2 MIXED HYPERLIPIDEMIA: ICD-10-CM

## 2024-04-11 LAB — HBA1C MFR BLD: 7.5 %

## 2024-04-11 PROCEDURE — 3079F DIAST BP 80-89 MM HG: CPT | Performed by: STUDENT IN AN ORGANIZED HEALTH CARE EDUCATION/TRAINING PROGRAM

## 2024-04-11 PROCEDURE — PBSHW AMB POC HEMOGLOBIN A1C: Performed by: STUDENT IN AN ORGANIZED HEALTH CARE EDUCATION/TRAINING PROGRAM

## 2024-04-11 PROCEDURE — 3075F SYST BP GE 130 - 139MM HG: CPT | Performed by: STUDENT IN AN ORGANIZED HEALTH CARE EDUCATION/TRAINING PROGRAM

## 2024-04-11 PROCEDURE — 83036 HEMOGLOBIN GLYCOSYLATED A1C: CPT | Performed by: STUDENT IN AN ORGANIZED HEALTH CARE EDUCATION/TRAINING PROGRAM

## 2024-04-11 PROCEDURE — 99214 OFFICE O/P EST MOD 30 MIN: CPT | Performed by: STUDENT IN AN ORGANIZED HEALTH CARE EDUCATION/TRAINING PROGRAM

## 2024-04-11 RX ORDER — ESCITALOPRAM OXALATE 20 MG/1
20 TABLET ORAL DAILY
Qty: 90 TABLET | Refills: 1 | Status: SHIPPED | OUTPATIENT
Start: 2024-04-11

## 2024-04-11 RX ORDER — PANTOPRAZOLE SODIUM 40 MG/1
40 TABLET, DELAYED RELEASE ORAL DAILY
Qty: 90 TABLET | Refills: 1 | Status: SHIPPED | OUTPATIENT
Start: 2024-04-11

## 2024-04-11 RX ORDER — LOSARTAN POTASSIUM AND HYDROCHLOROTHIAZIDE 12.5; 1 MG/1; MG/1
1 TABLET ORAL EVERY MORNING
Qty: 90 TABLET | Refills: 1 | Status: SHIPPED | OUTPATIENT
Start: 2024-04-11

## 2024-04-11 RX ORDER — ATENOLOL 50 MG/1
TABLET ORAL
Qty: 90 TABLET | Refills: 1 | Status: SHIPPED | OUTPATIENT
Start: 2024-04-11

## 2024-04-11 RX ORDER — DUPILUMAB 300 MG/2ML
300 INJECTION, SOLUTION SUBCUTANEOUS
COMMUNITY
Start: 2024-04-08 | End: 2024-04-11 | Stop reason: SDUPTHER

## 2024-04-11 RX ORDER — GLIPIZIDE 10 MG/1
10 TABLET ORAL DAILY
Qty: 90 TABLET | Refills: 1 | Status: SHIPPED | OUTPATIENT
Start: 2024-04-11

## 2024-04-11 RX ORDER — MONTELUKAST SODIUM 10 MG/1
10 TABLET ORAL DAILY
Qty: 90 TABLET | Refills: 3 | Status: SHIPPED | OUTPATIENT
Start: 2024-04-11

## 2024-04-11 RX ORDER — EPINEPHRINE 0.3 MG/.3ML
0.3 INJECTION SUBCUTANEOUS ONCE
COMMUNITY
Start: 2024-02-18

## 2024-04-11 RX ORDER — TRAMADOL HYDROCHLORIDE 50 MG/1
50 TABLET ORAL EVERY 6 HOURS PRN
COMMUNITY
Start: 2024-03-21 | End: 2024-04-12 | Stop reason: SDUPTHER

## 2024-04-11 RX ORDER — MAGNESIUM OXIDE 400 MG/1
400 TABLET ORAL DAILY
Qty: 90 TABLET | Refills: 1 | Status: SHIPPED | OUTPATIENT
Start: 2024-04-11

## 2024-04-11 RX ORDER — GABAPENTIN 300 MG/1
300 CAPSULE ORAL NIGHTLY
Qty: 90 CAPSULE | Refills: 1 | Status: SHIPPED | OUTPATIENT
Start: 2024-04-11 | End: 2024-04-12 | Stop reason: SDUPTHER

## 2024-04-11 RX ORDER — METHOCARBAMOL 750 MG/1
750 TABLET, FILM COATED ORAL 2 TIMES DAILY
COMMUNITY
Start: 2024-03-21

## 2024-04-11 RX ORDER — DIAZEPAM 5 MG/1
5 TABLET ORAL NIGHTLY PRN
COMMUNITY
Start: 2024-03-21

## 2024-04-11 RX ORDER — AMLODIPINE BESYLATE 10 MG/1
10 TABLET ORAL DAILY
Qty: 90 TABLET | Refills: 1 | Status: SHIPPED | OUTPATIENT
Start: 2024-04-11

## 2024-04-11 RX ORDER — GLIPIZIDE 5 MG/1
5 TABLET ORAL
Qty: 90 TABLET | Refills: 1 | Status: CANCELLED | OUTPATIENT
Start: 2024-04-11

## 2024-04-11 RX ORDER — ATORVASTATIN CALCIUM 40 MG/1
40 TABLET, FILM COATED ORAL DAILY
Qty: 90 TABLET | Refills: 1 | Status: SHIPPED | OUTPATIENT
Start: 2024-04-11

## 2024-04-11 NOTE — PROGRESS NOTES
Chief Complaint   Patient presents with    Back Pain     Follow-up     \"Have you been to the ER, urgent care clinic since your last visit?  Hospitalized since your last visit?\"    YES - When: approximately 3/20/24 ago.  Where and Why: [facility].    “Have you seen or consulted any other health care providers outside of [facility] since your last visit?”    YES - When: approximately 3/2024 ago.  Where and Why: back pain.     “Have you had a pap smear?”    NO    No cervical cancer screening on file             Click Here for Release of Records Request   
Mucosa healthy without drainage or ulceration.  OROPHARYNX: No suspicious lesions, normal dentition, pharynx, tongue and tonsils normal.  NECK: Supple; no masses; thyroid normal.  LUNGS: Respirations unlabored; clear to auscultation bilaterally.  CARDIOVASCULAR: Regular, rate, and rhythm without murmurs, gallops or rubs.  MUSCULOSKELETAL:ROM limited by back pain  Neuro: Mental Status: Pt is alert and oriented to person, place, and time.      Assessment/Plan      Diagnosis Orders   1. Type 2 diabetes mellitus with hyperglycemia, with long-term current use of insulin ([facility])  glipiZIDE (GLUCOTROL) 10 MG tablet    Dulaglutide 0.75 MG/0.5ML SOPN      2. Neuropathic pain  gabapentin (NEURONTIN) 300 MG capsule      3. Essential (primary) hypertension  amLODIPine (NORVASC) 10 MG tablet    atenolol (TENORMIN) 50 MG tablet    losartan-hydroCHLOROthiazide (HYZAAR) 100-12.5 MG per tablet    magnesium oxide (MAG-OX) 400 MG tablet      4. Adjustment disorder with depressed mood  escitalopram (LEXAPRO) 20 MG tablet      5. Severe persistent asthma, uncomplicated  montelukast (SINGULAIR) 10 MG tablet      6. Gastroesophageal reflux disease, unspecified whether esophagitis present  pantoprazole (PROTONIX) 40 MG tablet      7. Mixed hyperlipidemia  atorvastatin (LIPITOR) 40 MG tablet        1. Type 2 diabetes mellitus with hyperglycemia, with long-term current use of insulin ([facility])  Lab Results   Component Value Date/Time    SZS1JAVS 8.1 01/10/2024 08:35 AM       - Encouraged to work on lifestyle modifications including diet and exercise.  - Continue home monitoring. Glucose goals; Fasting (), preprandial (<140), 2-hr post-prandial (<180)    - glipiZIDE (GLUCOTROL) 10 MG tablet; Take 1 tablet by mouth daily  - Dulaglutide 0.75 MG/0.5ML SOPN; Inject 0.75 mg into the skin once a week      2. Neuropathic pain  - gabapentin (NEURONTIN) 300 MG capsule; Take 1 capsule by mouth nightly for 180 days. Max Daily Amount: 300 mg      3.

## 2024-04-17 DIAGNOSIS — M79.2 NEUROPATHIC PAIN: Primary | ICD-10-CM

## 2024-04-17 RX ORDER — GABAPENTIN 600 MG/1
600 TABLET ORAL NIGHTLY
Qty: 90 TABLET | Refills: 1 | Status: SHIPPED | OUTPATIENT
Start: 2024-04-17 | End: 2024-10-14

## 2024-04-17 NOTE — TELEPHONE ENCOUNTER
Received call from pt,  requesting to increase gabapentin to 600mg nightly,   med pended and sent to Dr Johnson

## 2024-07-19 ENCOUNTER — OFFICE VISIT (OUTPATIENT)
Age: 44
End: 2024-07-19
Payer: MEDICAID

## 2024-07-19 VITALS
OXYGEN SATURATION: 95 % | HEIGHT: 65 IN | DIASTOLIC BLOOD PRESSURE: 74 MMHG | BODY MASS INDEX: 48.82 KG/M2 | TEMPERATURE: 98.5 F | HEART RATE: 66 BPM | RESPIRATION RATE: 16 BRPM | SYSTOLIC BLOOD PRESSURE: 119 MMHG | WEIGHT: 293 LBS

## 2024-07-19 DIAGNOSIS — Z79.4 TYPE 2 DIABETES MELLITUS WITH HYPERGLYCEMIA, WITH LONG-TERM CURRENT USE OF INSULIN (HCC): Primary | ICD-10-CM

## 2024-07-19 DIAGNOSIS — I10 PRIMARY HYPERTENSION: ICD-10-CM

## 2024-07-19 DIAGNOSIS — E11.65 TYPE 2 DIABETES MELLITUS WITH HYPERGLYCEMIA, WITH LONG-TERM CURRENT USE OF INSULIN (HCC): Primary | ICD-10-CM

## 2024-07-19 LAB — HBA1C MFR BLD: 8.4 %

## 2024-07-19 PROCEDURE — 99214 OFFICE O/P EST MOD 30 MIN: CPT | Performed by: STUDENT IN AN ORGANIZED HEALTH CARE EDUCATION/TRAINING PROGRAM

## 2024-07-19 PROCEDURE — 83036 HEMOGLOBIN GLYCOSYLATED A1C: CPT | Performed by: STUDENT IN AN ORGANIZED HEALTH CARE EDUCATION/TRAINING PROGRAM

## 2024-07-19 PROCEDURE — 3074F SYST BP LT 130 MM HG: CPT | Performed by: STUDENT IN AN ORGANIZED HEALTH CARE EDUCATION/TRAINING PROGRAM

## 2024-07-19 PROCEDURE — PBSHW AMB POC HEMOGLOBIN A1C: Performed by: STUDENT IN AN ORGANIZED HEALTH CARE EDUCATION/TRAINING PROGRAM

## 2024-07-19 PROCEDURE — 3078F DIAST BP <80 MM HG: CPT | Performed by: STUDENT IN AN ORGANIZED HEALTH CARE EDUCATION/TRAINING PROGRAM

## 2024-07-19 RX ORDER — BACLOFEN 5 MG/1
5 TABLET ORAL 3 TIMES DAILY PRN
COMMUNITY
Start: 2024-05-03

## 2024-07-19 RX ORDER — DIAZEPAM 10 MG/1
10 TABLET ORAL SEE ADMIN INSTRUCTIONS
COMMUNITY

## 2024-07-19 RX ORDER — MECLIZINE HYDROCHLORIDE 25 MG/1
25 TABLET ORAL 3 TIMES DAILY PRN
COMMUNITY
Start: 2024-05-26

## 2024-07-19 RX ORDER — TRAMADOL HYDROCHLORIDE 50 MG/1
50 TABLET ORAL EVERY 6 HOURS PRN
COMMUNITY
Start: 2024-06-18

## 2024-07-19 RX ORDER — DUPILUMAB 300 MG/2ML
300 INJECTION, SOLUTION SUBCUTANEOUS
COMMUNITY
Start: 2024-06-20

## 2024-07-19 SDOH — ECONOMIC STABILITY: FOOD INSECURITY: WITHIN THE PAST 12 MONTHS, YOU WORRIED THAT YOUR FOOD WOULD RUN OUT BEFORE YOU GOT MONEY TO BUY MORE.: NEVER TRUE

## 2024-07-19 SDOH — ECONOMIC STABILITY: FOOD INSECURITY: WITHIN THE PAST 12 MONTHS, THE FOOD YOU BOUGHT JUST DIDN'T LAST AND YOU DIDN'T HAVE MONEY TO GET MORE.: NEVER TRUE

## 2024-07-19 SDOH — ECONOMIC STABILITY: INCOME INSECURITY: HOW HARD IS IT FOR YOU TO PAY FOR THE VERY BASICS LIKE FOOD, HOUSING, MEDICAL CARE, AND HEATING?: NOT HARD AT ALL

## 2024-07-19 ASSESSMENT — PATIENT HEALTH QUESTIONNAIRE - PHQ9
9. THOUGHTS THAT YOU WOULD BE BETTER OFF DEAD, OR OF HURTING YOURSELF: NOT AT ALL
2. FEELING DOWN, DEPRESSED OR HOPELESS: NOT AT ALL
4. FEELING TIRED OR HAVING LITTLE ENERGY: NOT AT ALL
6. FEELING BAD ABOUT YOURSELF - OR THAT YOU ARE A FAILURE OR HAVE LET YOURSELF OR YOUR FAMILY DOWN: NOT AT ALL
SUM OF ALL RESPONSES TO PHQ QUESTIONS 1-9: 0
5. POOR APPETITE OR OVEREATING: NOT AT ALL
SUM OF ALL RESPONSES TO PHQ QUESTIONS 1-9: 0
8. MOVING OR SPEAKING SO SLOWLY THAT OTHER PEOPLE COULD HAVE NOTICED. OR THE OPPOSITE, BEING SO FIGETY OR RESTLESS THAT YOU HAVE BEEN MOVING AROUND A LOT MORE THAN USUAL: NOT AT ALL
SUM OF ALL RESPONSES TO PHQ QUESTIONS 1-9: 0
SUM OF ALL RESPONSES TO PHQ9 QUESTIONS 1 & 2: 0
3. TROUBLE FALLING OR STAYING ASLEEP: NOT AT ALL
7. TROUBLE CONCENTRATING ON THINGS, SUCH AS READING THE NEWSPAPER OR WATCHING TELEVISION: NOT AT ALL
SUM OF ALL RESPONSES TO PHQ QUESTIONS 1-9: 0
10. IF YOU CHECKED OFF ANY PROBLEMS, HOW DIFFICULT HAVE THESE PROBLEMS MADE IT FOR YOU TO DO YOUR WORK, TAKE CARE OF THINGS AT HOME, OR GET ALONG WITH OTHER PEOPLE: NOT DIFFICULT AT ALL
1. LITTLE INTEREST OR PLEASURE IN DOING THINGS: NOT AT ALL

## 2024-07-19 NOTE — ASSESSMENT & PLAN NOTE
Uncontrolled, changes made today: will start Jardiance and recommend to resume Trulicity if tolerated, medication adherence emphasized, and lifestyle modifications recommended.  Lab Results   Component Value Date/Time    ZWM2YJXT 8.4 07/19/2024 08:33 AM   -Encouraged to work on lifestyle modifications including diet and exercise.  - Continue home monitoring. Glucose goals; Fasting (), preprandial (<140), 2-hr post-prandial (<180)

## 2024-07-19 NOTE — PROGRESS NOTES
[facility]  [address] [place] [address] [phone]        Subjective  Wendy France is a 44 y.o. 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  Pt has not been taking any of her medications.  Was suppose to be on Trulicity 1.5mg weekly and Glipizide. States that she stopped glipizide 10mg because of hypoglycemic episodes and it was lowered to 5mg but she still did not take. Unclear why she stopped Trulicity.    Previously on insulin when on steroids for her asthma but that was since d/c as well.    HTN & HLD  Pt is doing well on current meds with no medication side effects noted.  Not taking the statin.  No TIA's, no chest pain on exertion, no dyspnea on exertion, no swelling of ankles.  Exercising -walking, staying active.    Back pain:  [facility] and completed PT.  Has recent back MRI and waiting results    Pt denies any  fever, chill, chest pain, SOB, abdominal pain, n/v/d, HA or dizziness.     Other Health Habits and social history:  Oldest daughter to one of our nurses; Beverly Velazquez LPN    Other doctors:  -pulm - for asthma - on dupixent  - Ophthalmology - for keratoconus (needs a corneal transplant).    Allergies - reviewed:   Allergies   Allergen Reactions    Tetanus Toxoids Anaphylaxis    Metformin Other (See Comments)     Hair loss and unstable blood sugars    Penicillins Swelling       Past Medical History - reviewed:  Past Medical History:   Diagnosis Date    Alcohol abuse     Anxiety and depression     Asthma     DM (diabetes mellitus), type 2 (HCC)

## 2024-07-19 NOTE — PROGRESS NOTES
Chief Complaint   Patient presents with    Diabetes     Follow-up last A1C 7.5% 4/11/2024     \"Have you been to the ER, urgent care clinic since your last visit?  Hospitalized since your last visit?\"    NO    “Have you seen or consulted any other health care providers outside of [facility] since your last visit?”    NO                    Click Here for Release of Records Request

## 2024-08-05 ENCOUNTER — TELEPHONE (OUTPATIENT)
Age: 44
End: 2024-08-05

## 2024-09-05 ENCOUNTER — TELEPHONE (OUTPATIENT)
Age: 44
End: 2024-09-05

## 2024-09-05 NOTE — TELEPHONE ENCOUNTER
[employer] - disability claim     Would like an update       Best number to reach Ingrid [phone]

## 2024-10-21 ENCOUNTER — OFFICE VISIT (OUTPATIENT)
Age: 44
End: 2024-10-21
Payer: MEDICAID

## 2024-10-21 VITALS
TEMPERATURE: 98.7 F | HEIGHT: 65 IN | OXYGEN SATURATION: 98 % | SYSTOLIC BLOOD PRESSURE: 105 MMHG | DIASTOLIC BLOOD PRESSURE: 67 MMHG | BODY MASS INDEX: 48.82 KG/M2 | RESPIRATION RATE: 18 BRPM | HEART RATE: 88 BPM | WEIGHT: 293 LBS

## 2024-10-21 DIAGNOSIS — J45.40 MODERATE PERSISTENT ASTHMA WITHOUT COMPLICATION: ICD-10-CM

## 2024-10-21 DIAGNOSIS — Z79.4 TYPE 2 DIABETES MELLITUS WITH HYPERGLYCEMIA, WITH LONG-TERM CURRENT USE OF INSULIN (HCC): Primary | ICD-10-CM

## 2024-10-21 DIAGNOSIS — E11.65 TYPE 2 DIABETES MELLITUS WITH HYPERGLYCEMIA, WITH LONG-TERM CURRENT USE OF INSULIN (HCC): Primary | ICD-10-CM

## 2024-10-21 LAB — HBA1C MFR BLD: 7 %

## 2024-10-21 PROCEDURE — 99213 OFFICE O/P EST LOW 20 MIN: CPT | Performed by: STUDENT IN AN ORGANIZED HEALTH CARE EDUCATION/TRAINING PROGRAM

## 2024-10-21 PROCEDURE — 3078F DIAST BP <80 MM HG: CPT | Performed by: STUDENT IN AN ORGANIZED HEALTH CARE EDUCATION/TRAINING PROGRAM

## 2024-10-21 PROCEDURE — 83036 HEMOGLOBIN GLYCOSYLATED A1C: CPT | Performed by: STUDENT IN AN ORGANIZED HEALTH CARE EDUCATION/TRAINING PROGRAM

## 2024-10-21 PROCEDURE — 3074F SYST BP LT 130 MM HG: CPT | Performed by: STUDENT IN AN ORGANIZED HEALTH CARE EDUCATION/TRAINING PROGRAM

## 2024-10-21 PROCEDURE — PBSHW AMB POC HEMOGLOBIN A1C: Performed by: STUDENT IN AN ORGANIZED HEALTH CARE EDUCATION/TRAINING PROGRAM

## 2024-10-21 NOTE — ASSESSMENT & PLAN NOTE
Chronic, at goal (stable), continue current treatment plan.    Lab Results   Component Value Date/Time    [id number] 7.0 10/21/2024 03:18 PM     - Encouraged to work on lifestyle modifications including diet and exercise.  - Continue home monitoring. Glucose goals; Fasting (), preprandial (<140), 2-hr post-prandial (<180).  -Discussed hypoglycemic protocol and steps to take should this happen.

## 2024-10-21 NOTE — PROGRESS NOTES
Chief Complaint   Patient presents with    Follow-up     2024 Diabetes Last A1C 8.4%    FMLA Form Completion     Requesting office notes to state patient has frequent episodic flare ups often and needs assistance with respiratory treatment for updated FMLA forms.    \"Have you been to the ER, urgent care clinic since your last visit?  Hospitalized since your last visit?\"    NO    “Have you seen or consulted any other health care providers outside of [facility] since your last visit?”      Yes Pain management     There were no vitals filed for this visit.   Health Maintenance Due   Topic Date Due    Pneumococcal 0-64 years Vaccine (1 of 2 - PCV) Never done    Varicella vaccine (1 of 2 - 13+ 2-dose series) Never done    Hepatitis B vaccine (1 of 3 - 19+ 3-dose series) Never done    Cervical cancer screen  Never done    Diabetic foot exam  2017    Diabetic retinal exam  2022    Diabetic Alb to Cr ratio (uACR) test  2024    Flu vaccine (1) 2024    COVID-19 Vaccine (3 - - season) 2024    Lipids  2024    GFR test (Diabetes, CKD 3-4, OR last GFR 15-59)  2024        The patient, Wendy Frnace, identity was verified by name and .   
Faxed to : [facility] forms for care giver Beverly Velazquez [phone]  
happen.    Orders:  -     AMB POC HEMOGLOBIN A1C  2. Moderate persistent asthma without complication  Assessment & Plan:   Chronic, at goal (stable), continue current treatment plan       Follow up: 3 months. RTC to clinic sooner should symptoms persist, worsen or fail to improve as anticipated.    We discussed the expected course, resolution and complications of the diagnosis(es) in detail.  Medication risks, benefits, costs, interactions, and alternatives were discussed as indicated.  I advised to contact the office if his condition worsens, changes or fails to improve as anticipated. Pt expressed understanding with the diagnosis(es) and plan. Patient understands that this encounter was a temporary measure, and the importance of further follow up and examination was emphasized.  Patient verbalized understanding.      Signed By: Alex Johnson MD     October 21, 2024

## 2024-11-26 RX ORDER — DULAGLUTIDE 0.75 MG/.5ML
0.75 INJECTION, SOLUTION SUBCUTANEOUS WEEKLY
Qty: 3 ML | Refills: 2 | Status: SHIPPED | OUTPATIENT
Start: 2024-11-26

## 2024-12-02 DIAGNOSIS — K21.9 GASTROESOPHAGEAL REFLUX DISEASE, UNSPECIFIED WHETHER ESOPHAGITIS PRESENT: ICD-10-CM

## 2024-12-02 DIAGNOSIS — I10 ESSENTIAL (PRIMARY) HYPERTENSION: ICD-10-CM

## 2024-12-02 DIAGNOSIS — F43.21 ADJUSTMENT DISORDER WITH DEPRESSED MOOD: ICD-10-CM

## 2024-12-03 RX ORDER — AMLODIPINE BESYLATE 10 MG/1
10 TABLET ORAL DAILY
Qty: 90 TABLET | Refills: 3 | Status: SHIPPED | OUTPATIENT
Start: 2024-12-03

## 2024-12-03 RX ORDER — ESCITALOPRAM OXALATE 20 MG/1
20 TABLET ORAL DAILY
Qty: 90 TABLET | Refills: 3 | Status: SHIPPED | OUTPATIENT
Start: 2024-12-03

## 2024-12-03 RX ORDER — PANTOPRAZOLE SODIUM 40 MG/1
40 TABLET, DELAYED RELEASE ORAL DAILY
Qty: 90 TABLET | Refills: 3 | Status: SHIPPED | OUTPATIENT
Start: 2024-12-03

## 2024-12-13 DIAGNOSIS — Z79.4 TYPE 2 DIABETES MELLITUS WITH HYPERGLYCEMIA, WITH LONG-TERM CURRENT USE OF INSULIN (HCC): ICD-10-CM

## 2024-12-13 DIAGNOSIS — E11.65 TYPE 2 DIABETES MELLITUS WITH HYPERGLYCEMIA, WITH LONG-TERM CURRENT USE OF INSULIN (HCC): ICD-10-CM

## 2024-12-13 RX ORDER — EMPAGLIFLOZIN 10 MG/1
10 TABLET, FILM COATED ORAL DAILY
Qty: 90 TABLET | Refills: 1 | Status: SHIPPED | OUTPATIENT
Start: 2024-12-13

## 2024-12-13 NOTE — TELEPHONE ENCOUNTER
Last appointment: 10/21/24  Next appointment: 1/27/25  Previous refill encounter(s): 7/19/24 #30 with 3 refills    Requested Prescriptions     Pending Prescriptions Disp Refills    empagliflozin (JARDIANCE) 10 MG tablet [Pharmacy Med Name: Jardiance 10 MG Oral Tablet] 90 tablet 1     Sig: Take 1 tablet by mouth once daily         For Pharmacy Admin Tracking Only    Program: Medication Refill  CPA in place:    Recommendation Provided To:   Intervention Detail: New Rx: 1, reason: Patient Preference  Intervention Accepted By:   Gap Closed?:    Time Spent (min): 5

## 2025-01-27 ENCOUNTER — OFFICE VISIT (OUTPATIENT)
Age: 45
End: 2025-01-27
Payer: MEDICAID

## 2025-01-27 VITALS
SYSTOLIC BLOOD PRESSURE: 122 MMHG | TEMPERATURE: 98.3 F | RESPIRATION RATE: 16 BRPM | HEART RATE: 81 BPM | DIASTOLIC BLOOD PRESSURE: 81 MMHG | WEIGHT: 282.19 LBS | BODY MASS INDEX: 47.02 KG/M2 | OXYGEN SATURATION: 97 % | HEIGHT: 65 IN

## 2025-01-27 DIAGNOSIS — F43.21 ADJUSTMENT DISORDER WITH DEPRESSED MOOD: ICD-10-CM

## 2025-01-27 DIAGNOSIS — K21.9 GASTROESOPHAGEAL REFLUX DISEASE, UNSPECIFIED WHETHER ESOPHAGITIS PRESENT: ICD-10-CM

## 2025-01-27 DIAGNOSIS — E11.65 TYPE 2 DIABETES MELLITUS WITH HYPERGLYCEMIA, WITH LONG-TERM CURRENT USE OF INSULIN (HCC): Primary | ICD-10-CM

## 2025-01-27 DIAGNOSIS — J45.50 SEVERE PERSISTENT ASTHMA, UNCOMPLICATED: ICD-10-CM

## 2025-01-27 DIAGNOSIS — G89.29 CHRONIC BILATERAL LOW BACK PAIN, UNSPECIFIED WHETHER SCIATICA PRESENT: ICD-10-CM

## 2025-01-27 DIAGNOSIS — M79.2 NEUROPATHIC PAIN: ICD-10-CM

## 2025-01-27 DIAGNOSIS — E78.2 MIXED HYPERLIPIDEMIA: ICD-10-CM

## 2025-01-27 DIAGNOSIS — I10 ESSENTIAL (PRIMARY) HYPERTENSION: ICD-10-CM

## 2025-01-27 DIAGNOSIS — Z79.4 TYPE 2 DIABETES MELLITUS WITH HYPERGLYCEMIA, WITH LONG-TERM CURRENT USE OF INSULIN (HCC): ICD-10-CM

## 2025-01-27 DIAGNOSIS — E66.813 CLASS 3 SEVERE OBESITY WITH SERIOUS COMORBIDITY AND BODY MASS INDEX (BMI) OF 45.0 TO 49.9 IN ADULT, UNSPECIFIED OBESITY TYPE: ICD-10-CM

## 2025-01-27 DIAGNOSIS — E11.65 TYPE 2 DIABETES MELLITUS WITH HYPERGLYCEMIA, WITH LONG-TERM CURRENT USE OF INSULIN (HCC): ICD-10-CM

## 2025-01-27 DIAGNOSIS — Z79.4 TYPE 2 DIABETES MELLITUS WITH HYPERGLYCEMIA, WITH LONG-TERM CURRENT USE OF INSULIN (HCC): Primary | ICD-10-CM

## 2025-01-27 DIAGNOSIS — E66.01 CLASS 3 SEVERE OBESITY WITH SERIOUS COMORBIDITY AND BODY MASS INDEX (BMI) OF 45.0 TO 49.9 IN ADULT, UNSPECIFIED OBESITY TYPE: ICD-10-CM

## 2025-01-27 DIAGNOSIS — M54.50 CHRONIC BILATERAL LOW BACK PAIN, UNSPECIFIED WHETHER SCIATICA PRESENT: ICD-10-CM

## 2025-01-27 DIAGNOSIS — Z23 NEEDS FLU SHOT: ICD-10-CM

## 2025-01-27 LAB
COMMENT:: NORMAL
HBA1C MFR BLD: 6.2 %
SPECIMEN HOLD: NORMAL

## 2025-01-27 PROCEDURE — 83036 HEMOGLOBIN GLYCOSYLATED A1C: CPT | Performed by: STUDENT IN AN ORGANIZED HEALTH CARE EDUCATION/TRAINING PROGRAM

## 2025-01-27 PROCEDURE — 90661 CCIIV3 VAC ABX FR 0.5 ML IM: CPT | Performed by: STUDENT IN AN ORGANIZED HEALTH CARE EDUCATION/TRAINING PROGRAM

## 2025-01-27 PROCEDURE — 99214 OFFICE O/P EST MOD 30 MIN: CPT | Performed by: STUDENT IN AN ORGANIZED HEALTH CARE EDUCATION/TRAINING PROGRAM

## 2025-01-27 RX ORDER — MONTELUKAST SODIUM 10 MG/1
10 TABLET ORAL DAILY
Qty: 90 TABLET | Refills: 3 | Status: SHIPPED | OUTPATIENT
Start: 2025-01-27

## 2025-01-27 RX ORDER — DICLOFENAC SODIUM 75 MG/1
75 TABLET, DELAYED RELEASE ORAL 2 TIMES DAILY PRN
Qty: 60 TABLET | Refills: 0 | Status: SHIPPED | OUTPATIENT
Start: 2025-01-27

## 2025-01-27 RX ORDER — ACYCLOVIR 400 MG/1
TABLET ORAL
Qty: 3 EACH | Refills: 5 | Status: SHIPPED | OUTPATIENT
Start: 2025-01-27

## 2025-01-27 RX ORDER — PANTOPRAZOLE SODIUM 40 MG/1
40 TABLET, DELAYED RELEASE ORAL DAILY
Qty: 90 TABLET | Refills: 3 | Status: SHIPPED | OUTPATIENT
Start: 2025-01-27

## 2025-01-27 RX ORDER — HYDROXYZINE HYDROCHLORIDE 50 MG/1
50 TABLET, FILM COATED ORAL EVERY 4 HOURS PRN
COMMUNITY
Start: 2024-12-30 | End: 2025-01-27 | Stop reason: ALTCHOICE

## 2025-01-27 RX ORDER — MAGNESIUM OXIDE 400 MG/1
400 TABLET ORAL DAILY
Qty: 90 TABLET | Refills: 1 | Status: SHIPPED | OUTPATIENT
Start: 2025-01-27

## 2025-01-27 RX ORDER — LOSARTAN POTASSIUM AND HYDROCHLOROTHIAZIDE 12.5; 1 MG/1; MG/1
1 TABLET ORAL EVERY MORNING
Qty: 90 TABLET | Refills: 1 | Status: SHIPPED | OUTPATIENT
Start: 2025-01-27

## 2025-01-27 RX ORDER — ESCITALOPRAM OXALATE 20 MG/1
20 TABLET ORAL DAILY
Qty: 90 TABLET | Refills: 3 | Status: SHIPPED | OUTPATIENT
Start: 2025-01-27

## 2025-01-27 RX ORDER — AMLODIPINE BESYLATE 10 MG/1
10 TABLET ORAL DAILY
Qty: 90 TABLET | Refills: 3 | Status: SHIPPED | OUTPATIENT
Start: 2025-01-27

## 2025-01-27 RX ORDER — BACLOFEN 5 MG/1
5 TABLET ORAL DAILY PRN
Qty: 90 TABLET | Refills: 1 | Status: SHIPPED | OUTPATIENT
Start: 2025-01-27

## 2025-01-27 RX ORDER — ATENOLOL 50 MG/1
TABLET ORAL
Qty: 90 TABLET | Refills: 1 | Status: SHIPPED | OUTPATIENT
Start: 2025-01-27

## 2025-01-27 RX ORDER — ATORVASTATIN CALCIUM 40 MG/1
40 TABLET, FILM COATED ORAL DAILY
Qty: 90 TABLET | Refills: 1 | Status: SHIPPED | OUTPATIENT
Start: 2025-01-27

## 2025-01-27 RX ORDER — DULAGLUTIDE 0.75 MG/.5ML
0.75 INJECTION, SOLUTION SUBCUTANEOUS WEEKLY
Qty: 3 ML | Refills: 2 | Status: CANCELLED | OUTPATIENT
Start: 2025-01-27

## 2025-01-27 SDOH — ECONOMIC STABILITY: FOOD INSECURITY: WITHIN THE PAST 12 MONTHS, THE FOOD YOU BOUGHT JUST DIDN'T LAST AND YOU DIDN'T HAVE MONEY TO GET MORE.: NEVER TRUE

## 2025-01-27 SDOH — ECONOMIC STABILITY: FOOD INSECURITY: WITHIN THE PAST 12 MONTHS, YOU WORRIED THAT YOUR FOOD WOULD RUN OUT BEFORE YOU GOT MONEY TO BUY MORE.: NEVER TRUE

## 2025-01-27 ASSESSMENT — PATIENT HEALTH QUESTIONNAIRE - PHQ9
3. TROUBLE FALLING OR STAYING ASLEEP: NOT AT ALL
4. FEELING TIRED OR HAVING LITTLE ENERGY: NOT AT ALL
SUM OF ALL RESPONSES TO PHQ QUESTIONS 1-9: 0
10. IF YOU CHECKED OFF ANY PROBLEMS, HOW DIFFICULT HAVE THESE PROBLEMS MADE IT FOR YOU TO DO YOUR WORK, TAKE CARE OF THINGS AT HOME, OR GET ALONG WITH OTHER PEOPLE: NOT DIFFICULT AT ALL
6. FEELING BAD ABOUT YOURSELF - OR THAT YOU ARE A FAILURE OR HAVE LET YOURSELF OR YOUR FAMILY DOWN: NOT AT ALL
SUM OF ALL RESPONSES TO PHQ9 QUESTIONS 1 & 2: 0
2. FEELING DOWN, DEPRESSED OR HOPELESS: NOT AT ALL
SUM OF ALL RESPONSES TO PHQ QUESTIONS 1-9: 0
SUM OF ALL RESPONSES TO PHQ QUESTIONS 1-9: 0
1. LITTLE INTEREST OR PLEASURE IN DOING THINGS: NOT AT ALL
5. POOR APPETITE OR OVEREATING: NOT AT ALL
7. TROUBLE CONCENTRATING ON THINGS, SUCH AS READING THE NEWSPAPER OR WATCHING TELEVISION: NOT AT ALL
9. THOUGHTS THAT YOU WOULD BE BETTER OFF DEAD, OR OF HURTING YOURSELF: NOT AT ALL
8. MOVING OR SPEAKING SO SLOWLY THAT OTHER PEOPLE COULD HAVE NOTICED. OR THE OPPOSITE, BEING SO FIGETY OR RESTLESS THAT YOU HAVE BEEN MOVING AROUND A LOT MORE THAN USUAL: NOT AT ALL
SUM OF ALL RESPONSES TO PHQ QUESTIONS 1-9: 0

## 2025-01-27 NOTE — ASSESSMENT & PLAN NOTE
Chronic, at goal (stable), changes made today: increase dose of Trulicity, medication adherence emphasized, and lifestyle modifications recommended

## 2025-01-27 NOTE — ASSESSMENT & PLAN NOTE
Lost close to 20 lbs since last visit.  Increasing dose of Trulicity.  -Discussed lifestyle changes; the importance of eating habits and physical activity (5 days weekly for 30 mins/day minimally or as tolerated).  -Also discussed the importance to avoid smoking and excessive alcohol consumption.  - Encouraged to eat foods that are baked, broiled, boiled, steamed or grilled.  Avoid greasy or fried foods.  Use olive oil or canola oil instead of vegetable oil.  Eat fish twice weekly.  -Have at an average of 7 hours of uninterrupted sleep at night.

## 2025-01-27 NOTE — PROGRESS NOTES
Chief Complaint   Patient presents with    3 Month Follow-Up     10/21/2024 Diabetes Last A1C 7.0%    Medication Refill     \"Have you been to the ER, urgent care clinic since your last visit?  Hospitalized since your last visit?\"    YES - When: approximately 1 months ago.  Where and Why: Itching.    “Have you seen or consulted any other health care providers outside of [facility] since your last visit?”    NO        “Have you had a pap smear?”    YES - Where: Dr Wilson Nurse/CMA to request most recent records if not in the chart        Vitals:    25 1053   BP: 122/81   Pulse: 81   Resp: 16   Temp: 98.3 °F (36.8 °C)   TempSrc: Oral   SpO2: 97%   Weight: 128 kg (282 lb 3 oz)   Height: 1.651 m (5' 5\")      Health Maintenance Due   Topic Date Due    Pneumococcal 0-64 years Vaccine (1 of 2 - PCV) Never done    Varicella vaccine (1 of 2 - 13+ 2-dose series) Never done    Hepatitis B vaccine (1 of 3 - 19+ 3-dose series) Never done    Cervical cancer screen  Never done    Diabetic foot exam  2017    Diabetic retinal exam  2022    Diabetic Alb to Cr ratio (uACR) test  2024    Flu vaccine (1) 2024    COVID-19 Vaccine (3 - - season) 2024    Lipids  2024    GFR test (Diabetes, CKD 3-4, OR last GFR 15-59)  2024      The patient, Wendy France, identity was verified by name and , pharmacy verified  Labs:Yes  Fasting:Yes for Labs HP Labs.    After obtaining consent, and per orders of Dr. Johnson, injection of Flu Vacc. given in Left deltoid by HOWIE MARTI MA. Patient instructed to remain in clinic for 20 minutes afterwards, and to report any adverse reaction to me immediately.

## 2025-01-27 NOTE — PROGRESS NOTES
[facility]  [address] [place] [address] [phone]    C/C: acute/chronic medical problems  Subjective  Wendy France is a 44 y.o. 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  Pt has been more compliant with her meds per her report  On Trulicity 0.75MG weekly and jardiance 10mg daily    Previously:  States that she stopped glipizide 10mg because of hypoglycemic episodes    Previously on insulin when on steroids for her asthma but that was since d/c as well.    Obesity:  Lost close to 20 lbs since the last visit.      HTN & HLD  Pt is doing well on current meds with no medication side effects noted.  Not taking the statin.  No TIA's, no chest pain on exertion, no dyspnea on exertion, no swelling of ankles.  Exercising -walking, staying active.    Back pain:  Follows with [facility] and completed PT.  Has recent back MRI and waiting results    Asthma, well controlled with inhalers.    Pt denies any  fever, chill, chest pain, SOB, abdominal pain, n/v/d, HA or dizziness.     Other Health Habits and social history:  Oldest daughter to one of our nurses; Beverly Velazquez LPN    Other doctors:  -pulm - for asthma - on dupixent  - Ophthalmology - for keratoconus (needs a corneal transplant).  -Follows with pain management/Ortho - back    Allergies - reviewed:   Allergies   Allergen Reactions    Tetanus Toxoids Anaphylaxis    Metformin Other (See Comments)     Hair loss and unstable blood sugars    Penicillins Swelling       Past Medical History - reviewed:  Past Medical History:

## 2025-01-27 NOTE — ASSESSMENT & PLAN NOTE
-Discussed risks/benefits/side effects of this vaccine with patient. We discussed sore arm, redness or low-grade fever that generally go away within a few days. Discussed precautions with patient and advised to call us back or go to the ER if any worsening symptoms.

## 2025-01-28 ENCOUNTER — CLINICAL DOCUMENTATION (OUTPATIENT)
Age: 45
End: 2025-01-28

## 2025-01-28 LAB
ANION GAP SERPL CALC-SCNC: 6 MMOL/L (ref 2–12)
BUN SERPL-MCNC: 15 MG/DL (ref 6–20)
BUN/CREAT SERPL: 18 (ref 12–20)
CALCIUM SERPL-MCNC: 9.4 MG/DL (ref 8.5–10.1)
CHLORIDE SERPL-SCNC: 105 MMOL/L (ref 97–108)
CHOLEST SERPL-MCNC: 155 MG/DL
CO2 SERPL-SCNC: 26 MMOL/L (ref 21–32)
CREAT SERPL-MCNC: 0.84 MG/DL (ref 0.55–1.02)
CREAT UR-MCNC: 166 MG/DL
GLUCOSE SERPL-MCNC: 88 MG/DL (ref 65–100)
HDLC SERPL-MCNC: 54 MG/DL
HDLC SERPL: 2.9 (ref 0–5)
LDLC SERPL CALC-MCNC: 86.6 MG/DL (ref 0–100)
MICROALBUMIN UR-MCNC: 0.95 MG/DL
MICROALBUMIN/CREAT UR-RTO: 6 MG/G (ref 0–30)
POTASSIUM SERPL-SCNC: 4.4 MMOL/L (ref 3.5–5.1)
SODIUM SERPL-SCNC: 137 MMOL/L (ref 136–145)
SPECIMEN HOLD: NORMAL
TRIGL SERPL-MCNC: 72 MG/DL
VLDLC SERPL CALC-MCNC: 14.4 MG/DL

## 2025-01-28 NOTE — PROGRESS NOTES
PA initiated for Dexcom G7 sensor, denied, the information submitted does not show that you use insulin injections 3 times daily or currently using a insulin pump

## 2025-04-14 NOTE — Clinical Note
Called/faxed to [facility] indicating that the doctor has placed orders in Epic to collect blood and ua tomorrow and to review medications and check patient's bp tomorrow.      Requested call with update that labs collected/bp checked.   Please call for virtual follow-up in 6 weeks on new medications.   Thanks

## 2025-05-17 DIAGNOSIS — Z79.4 TYPE 2 DIABETES MELLITUS WITH HYPERGLYCEMIA, WITH LONG-TERM CURRENT USE OF INSULIN (HCC): ICD-10-CM

## 2025-05-17 DIAGNOSIS — E11.65 TYPE 2 DIABETES MELLITUS WITH HYPERGLYCEMIA, WITH LONG-TERM CURRENT USE OF INSULIN (HCC): ICD-10-CM

## 2025-05-19 RX ORDER — DULAGLUTIDE 1.5 MG/.5ML
INJECTION, SOLUTION SUBCUTANEOUS
Qty: 2 ML | Refills: 2 | Status: SHIPPED | OUTPATIENT
Start: 2025-05-19

## 2025-05-19 NOTE — TELEPHONE ENCOUNTER
Last appointment: 1/27/25  Next appointment: 6/13/25  Previous refill encounter(s): 1/27/25 #2ml with 2 refills    Requested Prescriptions     Pending Prescriptions Disp Refills    dulaglutide (TRULICITY) 1.5 MG/0.5ML SC injection [Pharmacy Med Name: Trulicity 1.5 MG/0.5ML Subcutaneous Solution Pen-injector] 2 mL 2     Sig: INJECT 0.5 ML SUBCUTANEOUSLY  ONCE A WEEK         For Pharmacy Admin Tracking Only    Program: Medication Refill  CPA in place:    Recommendation Provided To:   Intervention Detail: New Rx: 1, reason: Patient Preference  Intervention Accepted By:   Gap Closed?:    Time Spent (min): 5

## 2025-05-27 ENCOUNTER — TELEPHONE (OUTPATIENT)
Age: 45
End: 2025-05-27

## 2025-06-13 ENCOUNTER — OFFICE VISIT (OUTPATIENT)
Age: 45
End: 2025-06-13
Payer: MEDICAID

## 2025-06-13 VITALS
HEART RATE: 91 BPM | TEMPERATURE: 97.6 F | BODY MASS INDEX: 49.77 KG/M2 | OXYGEN SATURATION: 96 % | RESPIRATION RATE: 16 BRPM | DIASTOLIC BLOOD PRESSURE: 67 MMHG | HEIGHT: 63 IN | SYSTOLIC BLOOD PRESSURE: 124 MMHG | WEIGHT: 280.87 LBS

## 2025-06-13 DIAGNOSIS — E11.65 TYPE 2 DIABETES MELLITUS WITH HYPERGLYCEMIA, WITH LONG-TERM CURRENT USE OF INSULIN (HCC): Primary | ICD-10-CM

## 2025-06-13 DIAGNOSIS — Z79.4 TYPE 2 DIABETES MELLITUS WITH HYPERGLYCEMIA, WITH LONG-TERM CURRENT USE OF INSULIN (HCC): ICD-10-CM

## 2025-06-13 DIAGNOSIS — G89.29 CHRONIC BILATERAL LOW BACK PAIN, UNSPECIFIED WHETHER SCIATICA PRESENT: ICD-10-CM

## 2025-06-13 DIAGNOSIS — Z79.4 TYPE 2 DIABETES MELLITUS WITH HYPERGLYCEMIA, WITH LONG-TERM CURRENT USE OF INSULIN (HCC): Primary | ICD-10-CM

## 2025-06-13 DIAGNOSIS — M62.830 BACK MUSCLE SPASM: ICD-10-CM

## 2025-06-13 DIAGNOSIS — M54.50 CHRONIC BILATERAL LOW BACK PAIN, UNSPECIFIED WHETHER SCIATICA PRESENT: ICD-10-CM

## 2025-06-13 DIAGNOSIS — E11.65 TYPE 2 DIABETES MELLITUS WITH HYPERGLYCEMIA, WITH LONG-TERM CURRENT USE OF INSULIN (HCC): ICD-10-CM

## 2025-06-13 LAB — HBA1C MFR BLD: 6.1 %

## 2025-06-13 PROCEDURE — 99214 OFFICE O/P EST MOD 30 MIN: CPT | Performed by: STUDENT IN AN ORGANIZED HEALTH CARE EDUCATION/TRAINING PROGRAM

## 2025-06-13 PROCEDURE — 83036 HEMOGLOBIN GLYCOSYLATED A1C: CPT | Performed by: STUDENT IN AN ORGANIZED HEALTH CARE EDUCATION/TRAINING PROGRAM

## 2025-06-13 PROCEDURE — 3074F SYST BP LT 130 MM HG: CPT | Performed by: STUDENT IN AN ORGANIZED HEALTH CARE EDUCATION/TRAINING PROGRAM

## 2025-06-13 PROCEDURE — PBSHW AMB POC HEMOGLOBIN A1C: Performed by: STUDENT IN AN ORGANIZED HEALTH CARE EDUCATION/TRAINING PROGRAM

## 2025-06-13 PROCEDURE — 3044F HG A1C LEVEL LT 7.0%: CPT | Performed by: STUDENT IN AN ORGANIZED HEALTH CARE EDUCATION/TRAINING PROGRAM

## 2025-06-13 PROCEDURE — 3078F DIAST BP <80 MM HG: CPT | Performed by: STUDENT IN AN ORGANIZED HEALTH CARE EDUCATION/TRAINING PROGRAM

## 2025-06-13 RX ORDER — DICLOFENAC POTASSIUM 50 MG/1
50 TABLET, FILM COATED ORAL 2 TIMES DAILY PRN
Qty: 60 TABLET | Refills: 2 | Status: SHIPPED | OUTPATIENT
Start: 2025-06-13

## 2025-06-13 RX ORDER — GABAPENTIN 300 MG/1
300 CAPSULE ORAL 2 TIMES DAILY
Qty: 180 CAPSULE | Refills: 1 | Status: SHIPPED | OUTPATIENT
Start: 2025-06-13 | End: 2025-12-10

## 2025-06-13 RX ORDER — BACLOFEN 10 MG/1
10 TABLET ORAL 2 TIMES DAILY PRN
Qty: 90 TABLET | Refills: 2 | Status: SHIPPED | OUTPATIENT
Start: 2025-06-13

## 2025-06-13 NOTE — ASSESSMENT & PLAN NOTE
Chronic, at goal (stable), changes made today: increasing dose of Trulicity, medication adherence emphasized, and lifestyle modifications recommended    Lab Results   Component Value Date/Time    [id number] 6.1 06/13/2025 11:26 AM     - Encouraged to work on lifestyle modifications including diet and exercise.  - Continue home monitoring. Glucose goals; Fasting (), preprandial (<140), 2-hr post-prandial (<180).  -Discussed hypoglycemic protocol and steps to take should this happen; When sugar is less than 70 mg/dl; eat 15 grams of fast acting carbohydrates (3-4 glucose tabs, or 1/2 cup soda or juice) and recheck in 15-30 minutes. Repeat if still less than 70mg/dl

## 2025-06-13 NOTE — PROGRESS NOTES
Chief Complaint   Patient presents with    6 Month Follow-Up     2025 Diabetes Last A1C 6.2%     \"Have you been to the ER, urgent care clinic since your last visit?  Hospitalized since your last visit?\"    YES - When: approximately 1 months ago.  Where and Why: [facility].    “Have you seen or consulted any other health care providers outside of [facility] since your last visit?”    NO        “Have you had a pap smear?”    NO    There were no vitals filed for this visit.   Health Maintenance Due   Topic Date Due    Varicella vaccine (1 of 2 - 13+ 2-dose series) Never done    Hepatitis B vaccine (1 of 3 - 19+ 3-dose series) Never done    Pneumococcal 0-49 years Vaccine (1 of 2 - PCV) Never done    Cervical cancer screen  Never done    Diabetic foot exam  2017    Diabetic retinal exam  2022    COVID-19 Vaccine (3 - 2024-25 season) 2024      The patient, Wendy France, identity was verified by name and , pharmacy verified  Labs:Yes  Fasting:No         
(Back spasm), Disp-90 tablet, R-2Normal  -     diclofenac (CATAFLAM) 50 MG tablet; Take 1 tablet by mouth 2 times daily as needed for Pain, Disp-60 tablet, R-2Normal  3. Back muscle spasm  -     baclofen (LIORESAL) 10 MG tablet; Take 1 tablet by mouth 2 times daily as needed (Back spasm), Disp-90 tablet, R-2Normal       Follow up: 6 months. RTC to clinic sooner should symptoms persist, worsen or fail to improve as anticipated.    We discussed the expected course, resolution and complications of the diagnosis(es) in detail.  Medication risks, benefits, costs, interactions, and alternatives were discussed as indicated.  I advised to contact the office if his condition worsens, changes or fails to improve as anticipated. Pt expressed understanding with the diagnosis(es) and plan. Patient understands that this encounter was a temporary measure, and the importance of further follow up and examination was emphasized.  Patient verbalized understanding.      Signed By: Alex Johnosn MD     June 13, 2025

## 2025-06-13 NOTE — ASSESSMENT & PLAN NOTE
Increasing dose of gabapentin, baclofen and prescribing prn baclofen given persistent back pain while awaiting to see back specialist.  -Discussed risks/benefits/precautions/side effects of medication with the patient

## 2025-06-14 LAB
ALBUMIN SERPL-MCNC: 3.9 G/DL (ref 3.5–5)
ALBUMIN/GLOB SERPL: 1.1 (ref 1.1–2.2)
ALP SERPL-CCNC: 142 U/L (ref 45–117)
ALT SERPL-CCNC: 20 U/L (ref 12–78)
ANION GAP SERPL CALC-SCNC: 5 MMOL/L (ref 2–12)
AST SERPL-CCNC: 19 U/L (ref 15–37)
BILIRUB SERPL-MCNC: 0.3 MG/DL (ref 0.2–1)
BUN SERPL-MCNC: 17 MG/DL (ref 6–20)
BUN/CREAT SERPL: 17 (ref 12–20)
CALCIUM SERPL-MCNC: 9.5 MG/DL (ref 8.5–10.1)
CHLORIDE SERPL-SCNC: 106 MMOL/L (ref 97–108)
CO2 SERPL-SCNC: 28 MMOL/L (ref 21–32)
CREAT SERPL-MCNC: 1.02 MG/DL (ref 0.55–1.02)
GLOBULIN SER CALC-MCNC: 3.5 G/DL (ref 2–4)
GLUCOSE SERPL-MCNC: 102 MG/DL (ref 65–100)
POTASSIUM SERPL-SCNC: 3.8 MMOL/L (ref 3.5–5.1)
PROT SERPL-MCNC: 7.4 G/DL (ref 6.4–8.2)
SODIUM SERPL-SCNC: 139 MMOL/L (ref 136–145)

## 2025-06-20 ENCOUNTER — CLINICAL DOCUMENTATION (OUTPATIENT)
Age: 45
End: 2025-06-20

## 2025-06-22 ENCOUNTER — RESULTS FOLLOW-UP (OUTPATIENT)
Age: 45
End: 2025-06-22

## 2025-07-21 DIAGNOSIS — E11.65 TYPE 2 DIABETES MELLITUS WITH HYPERGLYCEMIA, WITH LONG-TERM CURRENT USE OF INSULIN (HCC): ICD-10-CM

## 2025-07-21 DIAGNOSIS — Z79.4 TYPE 2 DIABETES MELLITUS WITH HYPERGLYCEMIA, WITH LONG-TERM CURRENT USE OF INSULIN (HCC): ICD-10-CM

## 2025-07-23 ENCOUNTER — CLINICAL DOCUMENTATION (OUTPATIENT)
Age: 45
End: 2025-07-23

## 2025-08-08 ENCOUNTER — CLINICAL DOCUMENTATION (OUTPATIENT)
Age: 45
End: 2025-08-08

## 2025-09-01 DIAGNOSIS — I10 ESSENTIAL (PRIMARY) HYPERTENSION: ICD-10-CM

## 2025-09-03 RX ORDER — LOSARTAN POTASSIUM AND HYDROCHLOROTHIAZIDE 12.5; 1 MG/1; MG/1
1 TABLET ORAL EVERY MORNING
Qty: 90 TABLET | Refills: 1 | Status: SHIPPED | OUTPATIENT
Start: 2025-09-03